# Patient Record
Sex: MALE | Race: ASIAN | NOT HISPANIC OR LATINO
[De-identification: names, ages, dates, MRNs, and addresses within clinical notes are randomized per-mention and may not be internally consistent; named-entity substitution may affect disease eponyms.]

---

## 2019-01-22 ENCOUNTER — APPOINTMENT (OUTPATIENT)
Dept: UROLOGY | Facility: CLINIC | Age: 69
End: 2019-01-22

## 2019-09-10 ENCOUNTER — APPOINTMENT (OUTPATIENT)
Dept: UROLOGY | Facility: CLINIC | Age: 69
End: 2019-09-10
Payer: MEDICARE

## 2019-09-10 VITALS
TEMPERATURE: 98.1 F | RESPIRATION RATE: 16 BRPM | WEIGHT: 128 LBS | BODY MASS INDEX: 21.33 KG/M2 | DIASTOLIC BLOOD PRESSURE: 92 MMHG | OXYGEN SATURATION: 97 % | HEIGHT: 65 IN | SYSTOLIC BLOOD PRESSURE: 155 MMHG | HEART RATE: 79 BPM

## 2019-09-10 DIAGNOSIS — F17.210 NICOTINE DEPENDENCE, CIGARETTES, UNCOMPLICATED: ICD-10-CM

## 2019-09-10 DIAGNOSIS — Z00.00 ENCOUNTER FOR GENERAL ADULT MEDICAL EXAMINATION W/OUT ABNORMAL FINDINGS: ICD-10-CM

## 2019-09-10 DIAGNOSIS — F17.200 NICOTINE DEPENDENCE, UNSPECIFIED, UNCOMPLICATED: ICD-10-CM

## 2019-09-10 DIAGNOSIS — Z78.9 OTHER SPECIFIED HEALTH STATUS: ICD-10-CM

## 2019-09-10 LAB
AFP-TM SERPL-MCNC: 2.1 NG/ML
ANION GAP SERPL CALC-SCNC: 13 MMOL/L
APPEARANCE: CLEAR
BACTERIA: NEGATIVE
BILIRUBIN URINE: NEGATIVE
BLOOD URINE: NEGATIVE
BUN SERPL-MCNC: 19 MG/DL
CALCIUM SERPL-MCNC: 10.2 MG/DL
CHLORIDE SERPL-SCNC: 104 MMOL/L
CO2 SERPL-SCNC: 25 MMOL/L
COLOR: NORMAL
CREAT SERPL-MCNC: 0.84 MG/DL
GLUCOSE QUALITATIVE U: NEGATIVE
GLUCOSE SERPL-MCNC: 85 MG/DL
HCG-TM SERPL-MCNC: <1 MIU/ML
HYALINE CASTS: 0 /LPF
KETONES URINE: NEGATIVE
LDH SERPL-CCNC: 142 U/L
LEUKOCYTE ESTERASE URINE: NEGATIVE
MICROSCOPIC-UA: NORMAL
NITRITE URINE: NEGATIVE
PH URINE: 7.5
POTASSIUM SERPL-SCNC: 4 MMOL/L
PROTEIN URINE: NEGATIVE
RED BLOOD CELLS URINE: 4 /HPF
SODIUM SERPL-SCNC: 142 MMOL/L
SPECIFIC GRAVITY URINE: 1.02
SQUAMOUS EPITHELIAL CELLS: 1 /HPF
UROBILINOGEN URINE: NORMAL
WHITE BLOOD CELLS URINE: 2 /HPF

## 2019-09-10 PROCEDURE — 99204 OFFICE O/P NEW MOD 45 MIN: CPT

## 2019-09-10 NOTE — PHYSICAL EXAM
[General Appearance - Well Developed] : well developed [Normal Appearance] : normal appearance [General Appearance - Well Nourished] : well nourished [General Appearance - In No Acute Distress] : no acute distress [Well Groomed] : well groomed [Edema] : no peripheral edema [Respiration, Rhythm And Depth] : normal respiratory rhythm and effort [Exaggerated Use Of Accessory Muscles For Inspiration] : no accessory muscle use [Abdomen Soft] : soft [Abdomen Tenderness] : non-tender [Costovertebral Angle Tenderness] : no ~M costovertebral angle tenderness [Urethral Meatus] : meatus normal [Urinary Bladder Findings] : the bladder was normal on palpation [Scrotum] : the scrotum was normal [Testes Mass (___cm)] : there were no testicular masses [No Prostate Nodules] : no prostate nodules [Normal Station and Gait] : the gait and station were normal for the patient's age [] : no rash [No Focal Deficits] : no focal deficits [Affect] : the affect was normal [Oriented To Time, Place, And Person] : oriented to person, place, and time [Mood] : the mood was normal [Not Anxious] : not anxious [No Palpable Adenopathy] : no palpable adenopathy

## 2019-09-10 NOTE — ADDENDUM
[FreeTextEntry1] : Entered by Dallas Chung, acting as scribe for Dr. Jass Alcantara.\par \par The documentation recorded by the scribe accurately reflects the service I personally performed and the decisions made by me.

## 2019-09-10 NOTE — LETTER BODY
[FreeTextEntry1] : Dallas Heller MD\par [address] [place] [address] - [address]\[place]  [address]\par [phone]\par [phone]\par \par Dear Dr. Heller,\par \par Reason for visit: Microscopic hematuria.\par \par This is a 68 year-old Mandarin-speaking man with microscopic hematuria referred for evaluation. He is accompanied by his wife today. His urinalysis demonstrated evidence of microscopic hematuria, [] RBC/HPF. He denies any gross hematuria, dysuria or urinary incontinence. The patient denies any aggravating or relieving factors. The patient denies any interference of function. The patient is entirely asymptomatic. All other review of systems are negative. He has no cancer in his family medical history. He has previously undergone gastrectomy. Past medical history, family history and social history were inquired and were noncontributory to current condition. Patient currently smokes. I encourage the patient to stop smoking and seek smoking cessation programs. I discuss with him to potential long term complications and health effects from smoking. I gave the patient additional information including the [facility] Refer-to-Quit program. I spent over 3 minutes counseling the patient regarding smoking cessation. He did not bring his complete medication list with him today. He has no known allergies to medication. \par \par On examination, the patient is a healthy-appearing gentleman in no acute distress. He is alert and oriented follows commands. He has normal mood and affect. He is normocephalic. Neck is supple. Respirations are unlabored. His abdomen is soft and nontender. He has an upper midline incision from his previous surgery. Bladder is nonpalpable. No CVA tenderness. Neurologically he is grossly intact. No peripheral edema. Skin without gross abnormality. He has normal male external genitalia. Normal meatus. Bilateral testes are descended intrascrotally. He has a left testicular mass. On rectal examination, there is normal sphincter tone. The prostate is clinically benign without focal induration or nodularity.\par \par His urinalysis demonstrated evidence of microscopic hematuria and hyperuricemia.\par \par Assessment: Microscopic hematuria. Left testicular mass. Hyperuricemia.\par \par I counseled the patient on the various etiologies of the microscopic hematuria. I discussed the risk of occult malignancy. I recommended patient obtain urine cytology, urinalysis, and cystoscopy. I counseled the patient about the procedures. I answered the patient's questions. The risks and expected outcomes were discussed. In terms of his left testicular mass, the patient will obtain testicular ultrasound, CT urogram, and tumor marker panel. He will obtain BMP today to ensure stability. The patient will follow up as directed and contact me with any questions or concerns. Thank you for the opportunity to participate in the care of Mr. BRENNAN. I will keep you updated on his progress. \par \par Plan: Testicular ultrasound. Tumor marker panel. CT urogram. BMP. Urinalysis. Cytology. Cystoscopy. Follow up as directed.

## 2019-09-12 LAB — URINE CYTOLOGY: NORMAL

## 2019-09-13 ENCOUNTER — APPOINTMENT (OUTPATIENT)
Dept: UROLOGY | Facility: CLINIC | Age: 69
End: 2019-09-13
Payer: MEDICARE

## 2019-09-13 VITALS
SYSTOLIC BLOOD PRESSURE: 187 MMHG | BODY MASS INDEX: 21.97 KG/M2 | DIASTOLIC BLOOD PRESSURE: 100 MMHG | RESPIRATION RATE: 18 BRPM | OXYGEN SATURATION: 100 % | TEMPERATURE: 97.5 F | HEART RATE: 74 BPM | WEIGHT: 132 LBS

## 2019-09-13 PROCEDURE — 52000 CYSTOURETHROSCOPY: CPT

## 2019-09-13 RX ORDER — CIPROFLOXACIN HYDROCHLORIDE 500 MG/1
500 TABLET, FILM COATED ORAL
Refills: 0 | Status: COMPLETED | OUTPATIENT
Start: 2019-09-13

## 2019-09-13 RX ADMIN — CIPROFLOXACIN HYDROCHLORIDE 0 MG: 500 TABLET, FILM COATED ORAL at 00:00

## 2019-09-17 LAB — URINE CYTOLOGY: NORMAL

## 2019-09-24 ENCOUNTER — APPOINTMENT (OUTPATIENT)
Dept: UROLOGY | Facility: CLINIC | Age: 69
End: 2019-09-24

## 2019-12-17 ENCOUNTER — APPOINTMENT (OUTPATIENT)
Dept: UROLOGY | Facility: CLINIC | Age: 69
End: 2019-12-17
Payer: MEDICARE

## 2019-12-17 VITALS
SYSTOLIC BLOOD PRESSURE: 161 MMHG | TEMPERATURE: 97.8 F | BODY MASS INDEX: 22.13 KG/M2 | RESPIRATION RATE: 18 BRPM | HEART RATE: 98 BPM | WEIGHT: 133 LBS | OXYGEN SATURATION: 96 % | DIASTOLIC BLOOD PRESSURE: 100 MMHG

## 2019-12-17 PROCEDURE — 99213 OFFICE O/P EST LOW 20 MIN: CPT

## 2019-12-17 NOTE — LETTER BODY
[FreeTextEntry1] : Dallas Heller MD\par [address] [place] [address] - [address]\[place] [address]\par [phone]\par [phone]\par \par Dear Dr. Heller,\par \par Reason for visit: Microscopic hematuria. Bilateral hydrocele.\par \par This is a 68 year-old Mandarin-speaking man with microscopic hematuria and left hydrocele. He returns today for follow up. Since his last visit, he obtained an unremarkable CT urogram and a testicular ultrasound that demonstrated bilateral hydrocele. He denies any interval complaints or difficulties. He denies any gross hematuria, dysuria or urinary incontinence. The patient denies any aggravating or relieving factors. The patient denies any interference of function. The patient is entirely asymptomatic. He has no pain currently. He denies any changes in health. The past medical history, family history and social history are unchanged. All other review of systems are negative. Patient denies any changes in medications. Medication list was reconciled. He has no known allergies to medication. \par \par On examination, the patient is a healthy-appearing gentleman in no acute distress. He is alert and oriented follows commands. He has normal mood and affect. He is normocephalic. Neck is supple. Respirations are unlabored. His abdomen is soft and nontender. He has an upper midline incision from his previous surgery. Bladder is nonpalpable. No CVA tenderness. Neurologically he is grossly intact. No peripheral edema. Skin without gross abnormality. He has normal male external genitalia. Normal meatus. Bilateral testes are descended intrascrotally. He has a left testicular mass. On rectal examination, there is normal sphincter tone. The prostate is clinically benign without focal induration or nodularity. He has a large left hydrocele and small right hydrocele.\par \par His CT abdomen and pelvis demonstrated enlarged prostate and no evidence of renal stones or hydronephrosis. His testicular ultrasound demonstrated bilateral hydrocele and no evidence of testicular mass.\par \par Assessment: Microscopic hematuria. Bilateral hydrocele. Hyperuricemia.\par \par I counseled the patient. I reassured him. His recent scrotal ultrasound demonstrated bilateral hydrocele and no evidence of testicular mass. His CT urogram was negative for stones. I recommend conservative management as the patient has no pain and is currently asymptomatic. In terms of his previous microscopic hematuria, he will repeat urinalysis and urine cytology today to ensure stability. Risks and alternatives were discussed. I answered the patient questions. The patient will follow-up as directed and will contact me with any questions or concerns. Thank you for the opportunity to participate in the care of Mr. BRENNAN. I will keep you updated on his progress. \par \par Plan: Urinalysis. Cytology. Follow up in 1 year.

## 2019-12-18 LAB
APPEARANCE: CLEAR
BACTERIA: NEGATIVE
BILIRUBIN URINE: NEGATIVE
BLOOD URINE: NEGATIVE
COLOR: NORMAL
GLUCOSE QUALITATIVE U: NEGATIVE
HYALINE CASTS: 0 /LPF
KETONES URINE: NEGATIVE
LEUKOCYTE ESTERASE URINE: NEGATIVE
MICROSCOPIC-UA: NORMAL
NITRITE URINE: NEGATIVE
PH URINE: 6.5
PROTEIN URINE: NEGATIVE
RED BLOOD CELLS URINE: 1 /HPF
SPECIFIC GRAVITY URINE: 1.01
SQUAMOUS EPITHELIAL CELLS: 1 /HPF
UROBILINOGEN URINE: NORMAL
WHITE BLOOD CELLS URINE: 1 /HPF

## 2019-12-19 LAB — URINE CYTOLOGY: NORMAL

## 2020-12-18 ENCOUNTER — APPOINTMENT (OUTPATIENT)
Dept: UROLOGY | Facility: CLINIC | Age: 70
End: 2020-12-18

## 2022-02-15 ENCOUNTER — APPOINTMENT (OUTPATIENT)
Dept: CARDIOLOGY | Facility: CLINIC | Age: 72
End: 2022-02-15
Payer: MEDICARE

## 2022-02-15 VITALS
SYSTOLIC BLOOD PRESSURE: 170 MMHG | TEMPERATURE: 98.6 F | RESPIRATION RATE: 18 BRPM | WEIGHT: 132 LBS | DIASTOLIC BLOOD PRESSURE: 110 MMHG | HEART RATE: 117 BPM | OXYGEN SATURATION: 97 % | BODY MASS INDEX: 21.97 KG/M2

## 2022-02-15 DIAGNOSIS — R94.31 ABNORMAL ELECTROCARDIOGRAM [ECG] [EKG]: ICD-10-CM

## 2022-02-15 PROCEDURE — 99203 OFFICE O/P NEW LOW 30 MIN: CPT

## 2022-02-17 ENCOUNTER — APPOINTMENT (OUTPATIENT)
Dept: THORACIC SURGERY | Facility: CLINIC | Age: 72
End: 2022-02-17
Payer: MEDICARE

## 2022-02-17 VITALS
HEART RATE: 96 BPM | RESPIRATION RATE: 18 BRPM | SYSTOLIC BLOOD PRESSURE: 154 MMHG | DIASTOLIC BLOOD PRESSURE: 95 MMHG | WEIGHT: 131 LBS | OXYGEN SATURATION: 97 % | BODY MASS INDEX: 21.8 KG/M2 | TEMPERATURE: 98.2 F

## 2022-02-17 DIAGNOSIS — Z86.73 PERSONAL HISTORY OF TRANSIENT ISCHEMIC ATTACK (TIA), AND CEREBRAL INFARCTION W/OUT RESIDUAL DEFICITS: ICD-10-CM

## 2022-02-17 DIAGNOSIS — Z86.79 PERSONAL HISTORY OF OTHER DISEASES OF THE CIRCULATORY SYSTEM: ICD-10-CM

## 2022-02-17 DIAGNOSIS — Z80.9 FAMILY HISTORY OF MALIGNANT NEOPLASM, UNSPECIFIED: ICD-10-CM

## 2022-02-17 PROCEDURE — 99205 OFFICE O/P NEW HI 60 MIN: CPT

## 2022-02-18 PROBLEM — Z86.73 HISTORY OF CEREBROVASCULAR ACCIDENT: Status: RESOLVED | Noted: 2022-02-18 | Resolved: 2022-02-18

## 2022-02-18 PROBLEM — Z86.79 HISTORY OF CARDIAC DISORDER: Status: RESOLVED | Noted: 2022-02-18 | Resolved: 2022-02-18

## 2022-02-18 PROBLEM — Z86.79 HISTORY OF HYPERTENSION: Status: RESOLVED | Noted: 2022-02-18 | Resolved: 2022-02-18

## 2022-02-18 PROBLEM — Z80.9 FAMILY HISTORY OF MALIGNANT NEOPLASM: Status: ACTIVE | Noted: 2022-02-18

## 2022-02-18 RX ORDER — ENALAPRIL MALEATE 10 MG/1
10 TABLET ORAL
Qty: 30 | Refills: 0 | Status: ACTIVE | COMMUNITY
Start: 2022-02-04

## 2022-02-18 RX ORDER — CIPROFLOXACIN HYDROCHLORIDE 500 MG/1
500 TABLET, FILM COATED ORAL
Qty: 2 | Refills: 0 | Status: COMPLETED | COMMUNITY
Start: 2019-09-13 | End: 2022-02-18

## 2022-02-18 RX ORDER — AMLODIPINE BESYLATE 10 MG/1
10 TABLET ORAL
Qty: 30 | Refills: 0 | Status: ACTIVE | COMMUNITY
Start: 2022-02-04

## 2022-02-18 RX ORDER — METOPROLOL SUCCINATE 50 MG/1
50 TABLET, EXTENDED RELEASE ORAL
Qty: 30 | Refills: 0 | Status: ACTIVE | COMMUNITY
Start: 2022-02-04

## 2022-02-18 RX ORDER — HYDROCHLOROTHIAZIDE 25 MG/1
25 TABLET ORAL
Qty: 30 | Refills: 0 | Status: ACTIVE | COMMUNITY
Start: 2022-02-04

## 2022-02-18 NOTE — HISTORY OF PRESENT ILLNESS
[FreeTextEntry1] : Mr. JESUS BRENNAN, 71 year old male, current smoker, poor historian, w/ hx of HTN, stroke, heart disease?, who presented with abnormal CT for lung cancer screening. \par \par CT Chest on 1/3/22:\par - new 8 mm nodule w/o solid component in LLL laterally (4:135)\par \par Pt presents today for CT Sx consultation, referred by Dr. Heller. Pt reports asymptomatic, denies fever, chills, SOB, cough, hemoptysis, CP, pain or recent illness.\par \par

## 2022-02-18 NOTE — CONSULT LETTER
[Dear  ___] : Dear  [unfilled], [Consult Letter:] : I had the pleasure of evaluating your patient, [unfilled]. [( Thank you for referring [unfilled] for consultation for _____ )] : Thank you for referring [unfilled] for consultation for [unfilled] [Please see my note below.] : Please see my note below. [Consult Closing:] : Thank you very much for allowing me to participate in the care of this patient.  If you have any questions, please do not hesitate to contact me. [Sincerely,] : Sincerely, [FreeTextEntry2] : Dallas Heller MD (PCP/ref) [FreeTextEntry3] : Mark Velez MD, MPH \par System Director of Thoracic Surgery \par Director of Comprehensive Lung and Foregut [place] \par Professor Cardiovascular & Thoracic Surgery  \par [facility] at [facility]\par

## 2022-02-18 NOTE — PHYSICAL EXAM
[Ambulatory and capable of all self care but unable to carry out any work activities] : Status 2- Ambulatory and capable of all self care but unable to carry out any work activities. Up and about more than 50% of waking hours [General Appearance - Alert] : alert [General Appearance - In No Acute Distress] : in no acute distress [Sclera] : the sclera and conjunctiva were normal [PERRL With Normal Accommodation] : pupils were equal in size, round, and reactive to light [Extraocular Movements] : extraocular movements were intact [Outer Ear] : the ears and nose were normal in appearance [Oropharynx] : the oropharynx was normal [Neck Appearance] : the appearance of the neck was normal [Neck Cervical Mass (___cm)] : no neck mass was observed [Jugular Venous Distention Increased] : there was no jugular-venous distention [Thyroid Diffuse Enlargement] : the thyroid was not enlarged [Thyroid Nodule] : there were no palpable thyroid nodules [Auscultation Breath Sounds / Voice Sounds] : lungs were clear to auscultation bilaterally [Heart Rate And Rhythm] : heart rate was normal and rhythm regular [Heart Sounds] : normal S1 and S2 [Heart Sounds Gallop] : no gallops [Murmurs] : no murmurs [Heart Sounds Pericardial Friction Rub] : no pericardial rub [Examination Of The Chest] : the chest was normal in appearance [Chest Visual Inspection Thoracic Asymmetry] : no chest asymmetry [Diminished Respiratory Excursion] : normal chest expansion [2+] : left 2+ [Breast Appearance] : normal in appearance [Breast Palpation Mass] : no palpable masses [Bowel Sounds] : normal bowel sounds [Abdomen Soft] : soft [Abdomen Tenderness] : non-tender [Abdomen Mass (___ Cm)] : no abdominal mass palpated [Cervical Lymph Nodes Enlarged Posterior Bilaterally] : posterior cervical [Cervical Lymph Nodes Enlarged Anterior Bilaterally] : anterior cervical [Supraclavicular Lymph Nodes Enlarged Bilaterally] : supraclavicular [No CVA Tenderness] : no ~M costovertebral angle tenderness [No Spinal Tenderness] : no spinal tenderness [Abnormal Walk] : normal gait [Nail Clubbing] : no clubbing  or cyanosis of the fingernails [Musculoskeletal - Swelling] : no joint swelling seen [Motor Tone] : muscle strength and tone were normal [Skin Turgor] : normal skin turgor [Skin Color & Pigmentation] : normal skin color and pigmentation [] : no rash [Deep Tendon Reflexes (DTR)] : deep tendon reflexes were 2+ and symmetric [Sensation] : the sensory exam was normal to light touch and pinprick [No Focal Deficits] : no focal deficits [Oriented To Time, Place, And Person] : oriented to person, place, and time [Impaired Insight] : insight and judgment were intact [Affect] : the affect was normal [FreeTextEntry1] : deferred

## 2022-02-25 PROBLEM — R94.31 ABNORMAL ECG: Status: ACTIVE | Noted: 2022-02-25

## 2022-02-25 NOTE — HISTORY OF PRESENT ILLNESS
[FreeTextEntry1] : 71 year-old male smoker with HTN presents for evaluation of HTN.  Patient reports that he used to take 4 BP medications, Amlodipine 10 mg, Enalapril 10 mg, Metoprolol ER 50 mg, and HCTZ 25 mg.  He is now taking only one BP medication (big yellow pill) because he no longer has HA.  Patient denies CP, SOB, palpitations, or lightheadedness.  Patient denies h/o syncope.  Patient was noted to have an abnormal ECG by PCP, showing nonspecific STTw changes.  I advised patient to undergo an echocardiogram.  I will obtain insurance authorization (HTN, hypertensive heart disease).  I advised patient to take all 4 of his BP medications.

## 2022-03-15 ENCOUNTER — APPOINTMENT (OUTPATIENT)
Dept: CARDIOLOGY | Facility: CLINIC | Age: 72
End: 2022-03-15
Payer: MEDICARE

## 2022-03-15 VITALS — DIASTOLIC BLOOD PRESSURE: 81 MMHG | TEMPERATURE: 97.8 F | SYSTOLIC BLOOD PRESSURE: 153 MMHG

## 2022-03-15 PROCEDURE — 93306 TTE W/DOPPLER COMPLETE: CPT

## 2022-05-19 ENCOUNTER — APPOINTMENT (OUTPATIENT)
Dept: THORACIC SURGERY | Facility: CLINIC | Age: 72
End: 2022-05-19

## 2022-10-18 ENCOUNTER — APPOINTMENT (OUTPATIENT)
Dept: CARDIOLOGY | Facility: CLINIC | Age: 72
End: 2022-10-18

## 2022-10-18 ENCOUNTER — NON-APPOINTMENT (OUTPATIENT)
Age: 72
End: 2022-10-18

## 2022-10-18 VITALS
RESPIRATION RATE: 18 BRPM | SYSTOLIC BLOOD PRESSURE: 143 MMHG | WEIGHT: 134 LBS | OXYGEN SATURATION: 97 % | BODY MASS INDEX: 22.3 KG/M2 | TEMPERATURE: 98 F | HEART RATE: 86 BPM | DIASTOLIC BLOOD PRESSURE: 83 MMHG

## 2022-10-18 DIAGNOSIS — I10 ESSENTIAL (PRIMARY) HYPERTENSION: ICD-10-CM

## 2022-10-18 PROCEDURE — 99214 OFFICE O/P EST MOD 30 MIN: CPT | Mod: 25

## 2022-10-18 PROCEDURE — 93000 ELECTROCARDIOGRAM COMPLETE: CPT

## 2022-10-18 NOTE — PHYSICAL EXAM
[Well Developed] : well developed [No Acute Distress] : no acute distress [Well Nourished] : well nourished [Normal Conjunctiva] : normal conjunctiva [Normal Venous Pressure] : normal venous pressure [No Carotid Bruit] : no carotid bruit [Normal S1, S2] : normal S1, S2 [No Murmur] : no murmur [No Rub] : no rub [No Gallop] : no gallop [Clear Lung Fields] : clear lung fields [Good Air Entry] : good air entry [No Respiratory Distress] : no respiratory distress  [Soft] : abdomen soft [Non Tender] : non-tender [No Masses/organomegaly] : no masses/organomegaly [Normal Bowel Sounds] : normal bowel sounds [Normal Gait] : normal gait [No Edema] : no edema [No Cyanosis] : no cyanosis [No Clubbing] : no clubbing [No Varicosities] : no varicosities [Moves all extremities] : moves all extremities [No Focal Deficits] : no focal deficits [Normal Speech] : normal speech [Alert and Oriented] : alert and oriented [Normal memory] : normal memory

## 2022-10-31 NOTE — REASON FOR VISIT
[FreeTextEntry1] : 71 year-old male smoker with HTN presents for followup.  \par \par Patient was last seen on 2/15/22 for evaluation of HTN.

## 2022-10-31 NOTE — HISTORY OF PRESENT ILLNESS
[FreeTextEntry1] : 10/18/22- Patient denies CP, SOB, palpitations, or lightheadedness. Patient reports that he does not take BP at home. He reports getting HA when his BP goes over 180. Patient had CT in May and was told to follow up in 6 months. I advised patient to continue to follow up. \par \par 2/15/22 - Patient reports that he used to take 4 BP medications, Amlodipine 10 mg, Enalapril 10 mg, Metoprolol ER 50 mg, and HCTZ 25 mg.  He is now taking only one BP medication (big yellow pill) because he no longer has HA.  Patient denies CP, SOB, palpitations, or lightheadedness.  Patient denies h/o syncope.  Patient was noted to have an abnormal ECG by PCP, showing nonspecific STTw changes.  I advised patient to undergo an echocardiogram.  I will obtain insurance authorization (HTN, hypertensive heart disease).  I advised patient to take all 4 of his BP medications.

## 2022-12-15 ENCOUNTER — APPOINTMENT (OUTPATIENT)
Dept: THORACIC SURGERY | Facility: CLINIC | Age: 72
End: 2022-12-15

## 2022-12-15 VITALS
DIASTOLIC BLOOD PRESSURE: 70 MMHG | SYSTOLIC BLOOD PRESSURE: 106 MMHG | HEART RATE: 74 BPM | TEMPERATURE: 98 F | WEIGHT: 134 LBS | OXYGEN SATURATION: 96 % | RESPIRATION RATE: 18 BRPM | BODY MASS INDEX: 22.3 KG/M2

## 2022-12-15 PROCEDURE — 99214 OFFICE O/P EST MOD 30 MIN: CPT

## 2022-12-17 RX ORDER — CLOBETASOL PROPIONATE 0.5 MG/G
0.05 CREAM TOPICAL
Qty: 60 | Refills: 0 | Status: ACTIVE | COMMUNITY
Start: 2022-06-21

## 2022-12-17 RX ORDER — ATORVASTATIN CALCIUM 10 MG/1
10 TABLET, FILM COATED ORAL
Qty: 30 | Refills: 0 | Status: ACTIVE | COMMUNITY
Start: 2022-11-22

## 2022-12-17 RX ORDER — LOSARTAN POTASSIUM 100 MG/1
100 TABLET, FILM COATED ORAL
Qty: 30 | Refills: 0 | Status: ACTIVE | COMMUNITY
Start: 2022-12-11

## 2022-12-17 RX ORDER — AMOXICILLIN AND CLAVULANATE POTASSIUM 500; 125 MG/1; MG/1
500-125 TABLET, FILM COATED ORAL
Qty: 14 | Refills: 0 | Status: COMPLETED | COMMUNITY
Start: 2022-02-17 | End: 2022-12-17

## 2022-12-17 RX ORDER — HYDROCORTISONE 1 %
12 CREAM (GRAM) TOPICAL
Qty: 400 | Refills: 0 | Status: ACTIVE | COMMUNITY
Start: 2022-07-15

## 2022-12-17 NOTE — CONSULT LETTER
[FreeTextEntry2] : Dallas Heller MD (PCP/ref)  [FreeTextEntry3] : Mark Velez MD, MPH \par System Director of Thoracic Surgery \par Director of Comprehensive Lung and Foregut [place] \par Professor Cardiovascular & Thoracic Surgery  \par [facility] at [facility]\par \par [facility]\par [address]\par Oncology [address]\[place] [address]\par Tel: [phone]\par Fax: [phone]\par

## 2022-12-17 NOTE — DATA REVIEWED
[FreeTextEntry1] : I have independently reviewed the following:\par CT Chest on 5/10/22\par CT Chest on 12/6/22 at [facility]

## 2022-12-17 NOTE — ASSESSMENT
[FreeTextEntry1] : Mr. JESUS BRENNAN, 71 year old male, current smoker, poor historian, w/ hx of HTN, stroke, heart disease?, who presented with abnormal CT for lung cancer screening. \par \par CT Chest on 12/6/22 at [facility]:\par - stable 1.1cm LLL ggo (4:127), w/o solid component\par - no new nodules\par \par I have reviewed the patient's medical records and diagnostic images at time of this office consultation and have made the following recommendation:\par 1. CT reviewed, LLL ggo has increased in size, therefore, I recommended surgery. However, pt declined surgery for now, would like to continue observation. RTC in 6 mons with CT Chest w/o contrast\par 2. PFTs \par \par \par I, Dr. HILL, KENDALL CELESTE, personally performed the evaluation and management (E/M) services for this established patient who presents today with (a) new problem(s)/exacerbation of (an) existing condition(s).  That E/M includes conducting the examination, assessing all new/exacerbated conditions, and establishing a new plan of care.  Today, my ACP, Brittney Mojica NP was here to observe my evaluation and management services for this new problem/exacerbated condition to be followed going forward.\par \par \par \par \par

## 2022-12-17 NOTE — HISTORY OF PRESENT ILLNESS
[FreeTextEntry1] : Mr. JESUS BRENNAN, 71 year old male, current smoker, poor historian, w/ hx of HTN, stroke, heart disease?, who presented with abnormal CT for lung cancer screening. \par \par CT Chest on 1/3/22:\par - new 8 mm nodule w/o solid component in LLL laterally (4:135)\par ** lung nodule is likely to be inflammatory, completed a course to Augmentin in Feb 2022.\par \par Repeat CT Chest on 5/10/22:\par - stable 1.1cm LLL ggo (3:66)\par * Patient was a no show on 5/19/22 *\par \par CT Chest on 12/6/22 at [facility]:\par - stable 1.1cm LLL ggo (4:127), w/o solid component\par - no new nodules\par \par Patient is here today for a follow up. Pt reports doing well, denies SOB, cough or CP.\par \par \par

## 2023-06-15 ENCOUNTER — APPOINTMENT (OUTPATIENT)
Dept: THORACIC SURGERY | Facility: CLINIC | Age: 73
End: 2023-06-15
Payer: MEDICARE

## 2023-06-15 VITALS
BODY MASS INDEX: 22.95 KG/M2 | TEMPERATURE: 97.6 F | SYSTOLIC BLOOD PRESSURE: 187 MMHG | RESPIRATION RATE: 18 BRPM | WEIGHT: 137.9 LBS | OXYGEN SATURATION: 97 % | DIASTOLIC BLOOD PRESSURE: 94 MMHG | HEART RATE: 100 BPM

## 2023-06-15 PROCEDURE — 99214 OFFICE O/P EST MOD 30 MIN: CPT

## 2023-06-15 NOTE — CONSULT LETTER
[Dear  ___] : Dear  [unfilled], [Consult Letter:] : I had the pleasure of evaluating your patient, [unfilled]. [( Thank you for referring [unfilled] for consultation for _____ )] : Thank you for referring [unfilled] for consultation for [unfilled] [Please see my note below.] : Please see my note below. [Consult Closing:] : Thank you very much for allowing me to participate in the care of this patient.  If you have any questions, please do not hesitate to contact me. [Sincerely,] : Sincerely, [FreeTextEntry2] : Dallas Heller MD (PCP/ref)  [FreeTextEntry3] : Mark Velez MD, MPH \par System Director of Thoracic Surgery \par Director of Comprehensive Lung and Foregut [place] \par Professor Cardiovascular & Thoracic Surgery  \par [facility] at [facility]\par \par [facility]\par [address]\par Oncology [address]\[place] [address]\par Tel: [phone]\par Fax: [phone]\par

## 2023-06-15 NOTE — HISTORY OF PRESENT ILLNESS
[FreeTextEntry1] : Mr. JESUS BRENNAN, 72 year old male, current smoker, poor historian, w/ hx of HTN, stroke, heart disease?, who presented with abnormal CT for lung cancer screening. \par \par CT Chest on 1/3/22:\par - new 8 mm nodule w/o solid component in LLL laterally (4:135)\par ** lung nodule is likely to be inflammatory, completed a course to Augmentin in Feb 2022.\par \par Repeat CT Chest on 5/10/22:\par - stable 1.1cm LLL ggo (3:66)\par * Patient was a no show on 5/19/22 *\par \par CT Chest on 12/6/22 at [facility]:\par - stable 1.1cm LLL ggo (4:127), w/o solid component\par - no new nodules\par \par CT Chest on 6/6/23:\par stable 1.1 cm ggo in LLL (3:135)w/o solid component\par \par Pt presents today for follow up. The patient denies SOB, cough, chest pain, hemoptysis, palpitation, fever, recent illness, hospitalization and significant weight loss.\par \par \par \par \par

## 2023-06-15 NOTE — PHYSICAL EXAM
[Respiration, Rhythm And Depth] : normal respiratory rhythm and effort [Auscultation Breath Sounds / Voice Sounds] : lungs were clear to auscultation bilaterally [Apical Impulse] : the apical impulse was normal [Heart Rate And Rhythm] : heart rate was normal and rhythm regular [Heart Sounds] : normal S1 and S2 [Examination Of The Chest] : the chest was normal in appearance [Bowel Sounds] : normal bowel sounds [Abdomen Soft] : soft [Abdomen Tenderness] : non-tender [Skin Color & Pigmentation] : normal skin color and pigmentation [Skin Turgor] : normal skin turgor [] : no rash [No Focal Deficits] : no focal deficits [Oriented To Time, Place, And Person] : oriented to person, place, and time [Affect] : the affect was normal [Mood] : the mood was normal

## 2023-06-15 NOTE — ASSESSMENT
[FreeTextEntry1] : Mr. JESUS BRENNAN, 72 year old male, current smoker, poor historian, w/ hx of HTN, stroke, heart disease?, who presented with abnormal CT for lung cancer screening. \par \par CT Chest on 1/3/22:\par - new 8 mm nodule w/o solid component in LLL laterally (4:135)\par ** lung nodule is likely to be inflammatory, completed a course to Augmentin in Feb 2022.\par \par Repeat CT Chest on 5/10/22:\par - stable 1.1cm LLL ggo (3:66)\par * Patient was a no show on 5/19/22 *\par \par CT Chest on 12/6/22 at [facility]:\par - stable 1.1cm LLL ggo (4:127), w/o solid component\par - no new nodules\par \par CT Chest on 6/6/23:\par stable 1.1 cm ggo in LLL (3:135)w/o solid component\par \par I have reviewed the patient's medical records and diagnostic images at time of this office consultation and have made the following recommendation:\par 1. stable lung nodule, continue surveillance. \par 2. RTC in 1 year with CT chest. \par \par \par \par I, KENDALL Shultz, personally performed the evaluation and management (E/M) services for this established patient who follow up today with an existing condition.  That E/M includes conducting the examination, assessing all new/exacerbated/existing conditions, and establishing a plan of care.  Today, my ACP, Maci Brennan NP, was here to observe my evaluation and management services for this existing condition to be followed going forward.\par \par

## 2023-12-05 ENCOUNTER — APPOINTMENT (OUTPATIENT)
Dept: UROLOGY | Facility: CLINIC | Age: 73
End: 2023-12-05
Payer: MEDICARE

## 2023-12-05 VITALS
WEIGHT: 133 LBS | SYSTOLIC BLOOD PRESSURE: 133 MMHG | HEART RATE: 76 BPM | DIASTOLIC BLOOD PRESSURE: 78 MMHG | OXYGEN SATURATION: 96 % | TEMPERATURE: 97.6 F | RESPIRATION RATE: 18 BRPM | BODY MASS INDEX: 22.13 KG/M2

## 2023-12-05 DIAGNOSIS — R97.20 ELEVATED PROSTATE, SPECIFIC ANTIGEN [PSA]: ICD-10-CM

## 2023-12-05 DIAGNOSIS — N43.3 HYDROCELE, UNSPECIFIED: ICD-10-CM

## 2023-12-05 DIAGNOSIS — N50.89 OTHER SPECIFIED DISORDERS OF THE MALE GENITAL ORGANS: ICD-10-CM

## 2023-12-05 LAB
AFP-TM SERPL-MCNC: 2.3 NG/ML
ANION GAP SERPL CALC-SCNC: 11 MMOL/L
BUN SERPL-MCNC: 22 MG/DL
CALCIUM SERPL-MCNC: 10 MG/DL
CHLORIDE SERPL-SCNC: 101 MMOL/L
CO2 SERPL-SCNC: 27 MMOL/L
CREAT SERPL-MCNC: 1.1 MG/DL
EGFR: 71 ML/MIN/1.73M2
GLUCOSE SERPL-MCNC: 134 MG/DL
HCG-TM SERPL-MCNC: <1 MIU/ML
LDH SERPL-CCNC: 138 U/L
POTASSIUM SERPL-SCNC: 4.5 MMOL/L
PSA FREE FLD-MCNC: 29 %
PSA FREE SERPL-MCNC: 1.28 NG/ML
PSA SERPL-MCNC: 4.48 NG/ML
SODIUM SERPL-SCNC: 139 MMOL/L

## 2023-12-05 PROCEDURE — 99204 OFFICE O/P NEW MOD 45 MIN: CPT

## 2023-12-05 RX ORDER — SODIUM PHOSPHATE, DIBASIC AND SODIUM PHOSPHATE, MONOBASIC 7; 19 G/230ML; G/230ML
ENEMA RECTAL
Qty: 1 | Refills: 0 | Status: ACTIVE | COMMUNITY
Start: 2023-12-05 | End: 1900-01-01

## 2023-12-06 ENCOUNTER — NON-APPOINTMENT (OUTPATIENT)
Age: 73
End: 2023-12-06

## 2023-12-06 DIAGNOSIS — E79.0 HYPERURICEMIA W/OUT SIGNS OF INFLAMMATORY ARTHRITIS AND TOPHACEOUS DISEASE: ICD-10-CM

## 2023-12-06 DIAGNOSIS — R31.21 ASYMPTOMATIC MICROSCOPIC HEMATURIA: ICD-10-CM

## 2023-12-06 LAB
APPEARANCE: CLEAR
BACTERIA: NEGATIVE /HPF
BILIRUBIN URINE: NEGATIVE
BLOOD URINE: NEGATIVE
CAST: 1 /LPF
COARSE GRANULAR CASTS: PRESENT
COLOR: NORMAL
EPITHELIAL CELLS: 2 /HPF
GLUCOSE QUALITATIVE U: NEGATIVE MG/DL
HYALINE CASTS: PRESENT
KETONES URINE: NEGATIVE MG/DL
LEUKOCYTE ESTERASE URINE: NEGATIVE
MICROSCOPIC-UA: NORMAL
NITRITE URINE: NEGATIVE
PH URINE: 5.5
PROTEIN URINE: NORMAL MG/DL
RED BLOOD CELLS URINE: 9 /HPF
REVIEW: NORMAL
SPECIFIC GRAVITY URINE: 1.02
URINE CYTOLOGY: NORMAL
UROBILINOGEN URINE: 1 MG/DL
WHITE BLOOD CELLS URINE: 1 /HPF

## 2023-12-12 ENCOUNTER — NON-APPOINTMENT (OUTPATIENT)
Age: 73
End: 2023-12-12

## 2023-12-22 ENCOUNTER — APPOINTMENT (OUTPATIENT)
Dept: UROLOGY | Facility: CLINIC | Age: 73
End: 2023-12-22
Payer: MEDICARE

## 2023-12-22 VITALS
OXYGEN SATURATION: 99 % | TEMPERATURE: 96.8 F | SYSTOLIC BLOOD PRESSURE: 164 MMHG | DIASTOLIC BLOOD PRESSURE: 95 MMHG | HEART RATE: 77 BPM | WEIGHT: 140 LBS | RESPIRATION RATE: 18 BRPM | HEIGHT: 65 IN | BODY MASS INDEX: 23.32 KG/M2

## 2023-12-22 PROCEDURE — 52000 CYSTOURETHROSCOPY: CPT

## 2023-12-22 PROCEDURE — 76775 US EXAM ABDO BACK WALL LIM: CPT

## 2023-12-23 LAB
APPEARANCE: CLEAR
BACTERIA: NEGATIVE /HPF
BILIRUBIN URINE: NEGATIVE
BLOOD URINE: NEGATIVE
CAST: 0 /LPF
COLOR: YELLOW
EPITHELIAL CELLS: 0 /HPF
GLUCOSE QUALITATIVE U: NEGATIVE MG/DL
KETONES URINE: NEGATIVE MG/DL
LEUKOCYTE ESTERASE URINE: NEGATIVE
MICROSCOPIC-UA: NORMAL
NITRITE URINE: NEGATIVE
PH URINE: 7
PROTEIN URINE: NEGATIVE MG/DL
RED BLOOD CELLS URINE: 0 /HPF
SPECIFIC GRAVITY URINE: 1.01
UROBILINOGEN URINE: 0.2 MG/DL
WHITE BLOOD CELLS URINE: 0 /HPF

## 2023-12-26 LAB — URINE CYTOLOGY: NORMAL

## 2023-12-28 ENCOUNTER — NON-APPOINTMENT (OUTPATIENT)
Age: 73
End: 2023-12-28

## 2024-06-03 DIAGNOSIS — R91.1 SOLITARY PULMONARY NODULE: ICD-10-CM

## 2024-06-05 PROBLEM — R91.1 LUNG NODULE: Status: ACTIVE | Noted: 2022-02-16

## 2024-06-10 NOTE — CONSULT LETTER
[FreeTextEntry2] : Dallas Heller MD (PCP/ref)  [FreeTextEntry3] : Mark Velez MD, MPH \par  System Director of Thoracic Surgery \par  Director of Comprehensive Lung and Foregut [place] \par  Professor Cardiovascular & Thoracic Surgery  \par  [facility] at [facility]\par  \par  [facility]\par  [address]\par  Oncology [address]\[place] [address]\par  Tel: [phone]\par  Fax: [phone]\par

## 2024-06-10 NOTE — ASSESSMENT
[FreeTextEntry1] :   I have reviewed the patient's medical records and diagnostic images at time of this office consultation and have made the following recommendation: 1.  I, KENDALL Shultz, personally performed the evaluation and management (E/M) services for this established patient who presents today with (a) new problem(s)/exacerbation of (an) existing condition(s).  That E/M includes conducting the examination, assessing all new/exacerbated conditions, and establishing a new plan of care.  Today, my ACP, Brittney Mojica, ZULEMA-BC was here to observe my evaluation and management services for this new problem/exacerbated condition to be followed going forward.

## 2024-06-10 NOTE — HISTORY OF PRESENT ILLNESS
[FreeTextEntry1] : Mr. JESUS BRENNAN, 73 year old male, current smoker, poor historian, w/ hx of HTN, stroke, heart disease?, who presented with abnormal CT for lung cancer screening.   CT Chest on 12/6/22 [place] [facility] - stable 1.1cm LLL ggo (4:127), w/o solid component - no new nodules  CT Chest on 6/6/23: stable 1.1 cm ggo in LLL (3:135)w/o solid component  CT Chest on ...  Pt presents today for follow up.

## 2024-06-13 ENCOUNTER — APPOINTMENT (OUTPATIENT)
Dept: THORACIC SURGERY | Facility: CLINIC | Age: 74
End: 2024-06-13

## 2024-06-21 ENCOUNTER — APPOINTMENT (OUTPATIENT)
Dept: UROLOGY | Facility: CLINIC | Age: 74
End: 2024-06-21

## 2024-07-23 NOTE — HISTORY OF PRESENT ILLNESS
[FreeTextEntry1] : Mr. JESUS BRENNAN, 73 year old male, current smoker, poor historian, w/ hx of HTN, stroke, heart disease?, who presented with abnormal CT for lung cancer screening.   CT Chest [facility] 12[facility]: - stable 1.1cm LLL ggo (4:127), w/o solid component - no new nodules  CT Chest on 6/6/23: stable 1.1 cm ggo in LLL (3:135)w/o solid component  CT Chest on ...  Pt presents today for follow up.   **PHQ-2 completed on ...

## 2024-07-23 NOTE — ASSESSMENT
[FreeTextEntry1] : Mr. JESUS BRENNAN, 73 year old male, current smoker, poor historian, w/ hx of HTN, stroke, heart disease?, who presented with abnormal CT for lung cancer screening.     I have reviewed the patient's medical records and diagnostic images at time of this office consultation and have made the following recommendation: 1.   I, KENDALL Shultz, personally performed the evaluation and management (E/M) services for this established patient who presents today with (a) new problem(s)/exacerbation of (an) existing condition(s). That E/M includes conducting the examination, assessing all new/exacerbated conditions, and establishing a new plan of care. Today, my ACP, Ida Christie NP was here to observe my evaluation and management services for this new problem/exacerbated condition to be followed going forward.

## 2024-07-23 NOTE — CONSULT LETTER
[FreeTextEntry2] : Dallas Heller MD (PCP/ref)  [FreeTextEntry3] : Mark Velez MD, MPH \par  System Director of Thoracic Surgery \par  Director of Comprehensive Lung and Foregut [place] \par  Professor Cardiovascular & Thoracic Surgery  \par  [facility]\par  \par  [facility]\par  [address]\par  Oncology [address]\[place] [address]\par  Tel: [phone]\par  Fax: [phone]\par

## 2024-07-25 ENCOUNTER — APPOINTMENT (OUTPATIENT)
Dept: THORACIC SURGERY | Facility: CLINIC | Age: 74
End: 2024-07-25

## 2024-08-26 NOTE — ASSESSMENT
[FreeTextEntry1] : Mr. JESUS BRENNAN, 73 year old male, current smoker, poor historian, w/ hx of HTN, stroke, heart disease?, who presented with abnormal CT for lung cancer screening.   I have reviewed the patient's medical records and diagnostic images at time of this office consultation and have made the following recommendation: 1.

## 2024-08-26 NOTE — CONSULT LETTER
[Dear  ___] : Dear  [unfilled], [Consult Letter:] : I had the pleasure of evaluating your patient, [unfilled]. [( Thank you for referring [unfilled] for consultation for _____ )] : Thank you for referring [unfilled] for consultation for [unfilled] [Please see my note below.] : Please see my note below. [Consult Closing:] : Thank you very much for allowing me to participate in the care of this patient.  If you have any questions, please do not hesitate to contact me. [Sincerely,] : Sincerely, [FreeTextEntry2] : Dallas Heller MD (PCP/ref)  [FreeTextEntry3] : Mark Velez MD, MPH \par  System Director of Thoracic Surgery \par  Director of Comprehensive Lung and Foregut [place] \par  Professor Cardiovascular & Thoracic Surgery  \par  [facility]\par  \par  [facility]\par  [address]\par  Oncology [address]\[place] [address]\par  Tel: [phone]\par  Fax: [phone]\par

## 2024-08-26 NOTE — HISTORY OF PRESENT ILLNESS
[FreeTextEntry1] : Mr. JESUS BRENNAN, 73 year old male, current smoker, poor historian, w/ hx of HTN, stroke, heart disease?, who presented with abnormal CT for lung cancer screening.   [place] 12[place]6/22 [place] [facility] [place] - stable 1.1cm LLL ggo (4:127), w/o solid component - no new nodules  CT Chest on 6/6/23: stable 1.1 cm ggo in LLL (3:135)w/o solid component  CT Chest on ...  Pt presents today for follow up.   **PHQ-2 completed on ...

## 2024-08-29 ENCOUNTER — APPOINTMENT (OUTPATIENT)
Dept: THORACIC SURGERY | Facility: CLINIC | Age: 74
End: 2024-08-29

## 2024-09-04 NOTE — CONSULT LETTER
[FreeTextEntry2] : Dallas Heller MD (PCP/ref)  [FreeTextEntry3] : Mark Velez MD, MPH \par  System Director of Thoracic Surgery \par  Director of Comprehensive Lung and Foregut [place] \par  Professor Cardiovascular & Thoracic Surgery  \par  [facility]\par  \par  [facility]\par  [address]\par  Oncology [address]\[place] [address]\par  Tel: [phone]\par  Fax: [phone]\par

## 2024-09-04 NOTE — CONSULT LETTER
[FreeTextEntry2] : Dallas Heller MD (PCP/ref)  [FreeTextEntry3] : Mark Velez MD, MPH \par  System Director of Thoracic Surgery \par  Director of Comprehensive Lung and Foregut [place] \par  Professor Cardiovascular & Thoracic Surgery  \par  [facility]\par  \par  [facility]\par  [address]\par  Oncology [address]\[place] [address]\par  Tel: [phone]\par  Fax: [phone]\par

## 2024-09-04 NOTE — HISTORY OF PRESENT ILLNESS
[FreeTextEntry1] : Mr. JESUS BRENNAN, 73 year old male, current smoker, poor historian, w/ hx of HTN, stroke, heart disease?, who presented with abnormal CT for lung cancer screening.   CT Chest on 12/6/22 at [facility]: - stable 1.1cm LLL ggo (4:127), w/o solid component - no new nodules  CT Chest on 6/6/23: stable 1.1 cm ggo in LLL (3:135)w/o solid component  CT Chest on 8/30/24 at [facility]: -  no focal consolidation, pleural effusion, pneumothorax or mass.  - In the left lower lobe is a 1.3 cm groundglass nodule without solid component, previously measuring approximately 1.2 cm when measured similarly.  - No new pulmonary nodule is seen. There is mild subsegmental atelectasis at the lung bases. - Probable small left thyroid gland nodule is stable.  Pt presents today for follow up.   **PHQ-2 completed on ...

## 2024-09-04 NOTE — DATA REVIEWED
[FreeTextEntry1] : I have independently reviewed the following: [place] Chest [place] 8[place]30[place]24 [place] [facility] [place]

## 2024-09-04 NOTE — DATA REVIEWED
[FreeTextEntry1] : I have independently reviewed the following: CT Chest on 8/30/24 at [facility] [place]

## 2024-09-04 NOTE — ASSESSMENT
[FreeTextEntry1] : Mr. JESUS BRENNAN, 73 year old male, current smoker, poor historian, w/ hx of HTN, stroke, heart disease?, who presented with abnormal CT for lung cancer screening.   CT Chest on 8/30/24 at [facility]: -  no focal consolidation, pleural effusion, pneumothorax or mass.  - In the left lower lobe is a 1.3 cm groundglass nodule without solid component, previously measuring approximately 1.2 cm when measured similarly.  - No new pulmonary nodule is seen. There is mild subsegmental atelectasis at the lung bases. - Probable small left thyroid gland nodule is stable.  I have reviewed the patient's medical records and diagnostic images at time of this office consultation and have made the following recommendation: 1.   I, KENDALL Shultz, personally performed the evaluation and management (E/M) services for this established patient who presents today with (a) new problem(s)/exacerbation of (an) existing condition(s). That E/M includes conducting the examination, assessing all new/exacerbated conditions, and establishing a new plan of care. Today, my ACP, Ida Christie NP was here to observe my evaluation and management services for this new problem/exacerbated condition to be followed going forward.

## 2024-09-04 NOTE — DATA REVIEWED
[FreeTextEntry1] : I have independently reviewed the following[place] [facility] [place]

## 2024-09-04 NOTE — CONSULT LETTER
[FreeTextEntry2] : Dallas Heller MD (PCP/ref)  [FreeTextEntry3] : Mark Velez MD, MPH \par  System Director of Thoracic Surgery \par  Director of Comprehensive Lung and Foregut [place] \par  Professor Cardiovascular & Thoracic Surgery  \par  [facility]\par  \par  [facility]\par  [address]\par  Oncology [address]\[place] [address]\par  Tel: [phone]\par  Fax: [phone]\par

## 2024-09-05 ENCOUNTER — APPOINTMENT (OUTPATIENT)
Dept: THORACIC SURGERY | Facility: CLINIC | Age: 74
End: 2024-09-05
Payer: COMMERCIAL

## 2024-09-05 VITALS
WEIGHT: 124 LBS | HEART RATE: 87 BPM | SYSTOLIC BLOOD PRESSURE: 123 MMHG | RESPIRATION RATE: 16 BRPM | OXYGEN SATURATION: 98 % | BODY MASS INDEX: 20.63 KG/M2 | DIASTOLIC BLOOD PRESSURE: 75 MMHG

## 2024-09-05 DIAGNOSIS — R91.1 SOLITARY PULMONARY NODULE: ICD-10-CM

## 2024-09-05 PROCEDURE — 99204 OFFICE O/P NEW MOD 45 MIN: CPT

## 2024-09-10 ENCOUNTER — NON-APPOINTMENT (OUTPATIENT)
Age: 74
End: 2024-09-10

## 2024-09-10 ENCOUNTER — APPOINTMENT (OUTPATIENT)
Dept: CARDIOLOGY | Facility: CLINIC | Age: 74
End: 2024-09-10
Payer: COMMERCIAL

## 2024-09-10 VITALS
BODY MASS INDEX: 20.63 KG/M2 | OXYGEN SATURATION: 96 % | DIASTOLIC BLOOD PRESSURE: 72 MMHG | HEART RATE: 108 BPM | SYSTOLIC BLOOD PRESSURE: 107 MMHG | WEIGHT: 124 LBS | RESPIRATION RATE: 16 BRPM

## 2024-09-10 DIAGNOSIS — I25.10 ATHEROSCLEROTIC HEART DISEASE OF NATIVE CORONARY ARTERY W/OUT ANGINA PECTORIS: ICD-10-CM

## 2024-09-10 DIAGNOSIS — Z98.61 ATHEROSCLEROTIC HEART DISEASE OF NATIVE CORONARY ARTERY W/OUT ANGINA PECTORIS: ICD-10-CM

## 2024-09-10 PROCEDURE — 93000 ELECTROCARDIOGRAM COMPLETE: CPT

## 2024-09-10 PROCEDURE — G2211 COMPLEX E/M VISIT ADD ON: CPT

## 2024-09-10 PROCEDURE — 99205 OFFICE O/P NEW HI 60 MIN: CPT

## 2024-09-10 RX ORDER — ROSUVASTATIN CALCIUM 20 MG/1
20 TABLET, FILM COATED ORAL DAILY
Qty: 90 | Refills: 3 | Status: ACTIVE | COMMUNITY
Start: 2024-09-10 | End: 1900-01-01

## 2024-09-10 RX ORDER — TICAGRELOR 90 MG/1
90 TABLET ORAL TWICE DAILY
Qty: 180 | Refills: 3 | Status: ACTIVE | COMMUNITY
Start: 2024-09-10 | End: 1900-01-01

## 2024-09-10 RX ORDER — HYDROCHLOROTHIAZIDE 50 MG/1
50 TABLET ORAL DAILY
Qty: 90 | Refills: 3 | Status: ACTIVE | COMMUNITY
Start: 2024-09-10 | End: 1900-01-01

## 2024-09-10 RX ORDER — ASPIRIN ENTERIC COATED TABLETS 81 MG 81 MG/1
81 TABLET, DELAYED RELEASE ORAL DAILY
Qty: 90 | Refills: 3 | Status: ACTIVE | COMMUNITY
Start: 2024-09-10 | End: 1900-01-01

## 2024-09-10 NOTE — HISTORY OF PRESENT ILLNESS
[FreeTextEntry1] : 73M with PMH of CAD s/p PCI to LAD and RCA in 3/2024 with residual Cx/OM disease, HTN, HLD, and lung nodule. He is pending Left VATS, RA, LLL segmentectomy, possible lobectomy, and MLND with Dr. Mark Velez on 10/8 due to enlarging lung nodule. Here for preop cardiac clearance.   Cardiac hx  3/2024 in [place]- angiogram reviewed. S/p PCI to RCA and LAD. Has a moderate-severe lesion in mCx/OM1 unrevascularized.  7/2024- had exercise stress test and TTE at Dr. Austyn Garcia office= exercised 7 min 37 sec on modified asif protocol, achieved 8.6 METS, stress EKG was abnormal however pt did not have significant symptoms. TTE with normal EF, mild diastolic dysfunction, trace AR, mild TR and IN.   Medication ASA 81 mg qd, Brilinta 90 mg BID, losartan 100 mg QD, Hctz 50 mg QD, amlodipine 10 mg QD, Toprol XL 50 mg QD, crestor 20 mg QHS and prilosec 40 mg QD   EKG NSR, LVH, no significant ST-T changes   Surgical hx + gastrectomy   Social hx + current every day smoker  Assessment and Plan 1. Lung nodule, scheduled for Left VATS, RA, LLL segmentectomy, possible lobectomy, and MLND w/ Dr. Velez  2. CAD s/p PCI to RCA and LAD, mod-severe Cx-OM lesion.  3. HTN  4. HLD  5. Pre diabete (A1c 6.4)   -pt completed >8 METS on recent stress, no significant ischemic symptoms, TTE with normal EF and mild valvular pathology. Given the enlarging lung nodule and suspicion for lung malignancy, pt is optimized from cardiac standpoint for the planned procedure.  -pt will follow up with me post lung procedure in late Oct for further work up of his mod-severe Cx-OM lesion -last PCI in 3/2024, cont DAPT, would continue ASA through perioperative period. Instructted pt to stop Brilinta 5-7 days prior to surgery date.  -cont toprol XL 50 mg QD, losartan 100 mg QD, Hctz 50 mg QD, and norvasc 10 mg QD  -cont crestor 20 mg QHS for HLD   During non face-to-face time, I reviewed relevant portions of the patient's medical record. During face-to-face time, I took a relevant history and examined the patient. I also explained differential diagnoses, relevant cardiac diagnoses, workup, and management plan, which required a moderate level of medical decision making. I answered all questions related to the patient's medical conditions.  Ce Gutiérrez MD [facility]  Attending Interventional Cardiologist and General Cardiologist  [facility]

## 2024-09-12 ENCOUNTER — NON-APPOINTMENT (OUTPATIENT)
Age: 74
End: 2024-09-12

## 2024-09-12 RX ORDER — OMEPRAZOLE 40 MG/1
40 CAPSULE, DELAYED RELEASE ORAL
Refills: 0 | Status: ACTIVE | COMMUNITY

## 2024-09-12 RX ORDER — EZETIMIBE 10 MG/1
10 TABLET ORAL
Refills: 0 | Status: ACTIVE | COMMUNITY

## 2024-09-12 RX ORDER — DONEPEZIL HYDROCHLORIDE 5 MG/1
5 TABLET ORAL
Refills: 0 | Status: ACTIVE | COMMUNITY

## 2024-09-12 RX ORDER — ICOSAPENT ETHYL 1 G/1
1 CAPSULE ORAL
Refills: 0 | Status: ACTIVE | COMMUNITY

## 2024-09-23 NOTE — ASSESSMENT
[FreeTextEntry1] : Mr. JESUS BRENNAN, 73 year old male, current smoker, poor historian, w/ hx of HTN, stroke, heart disease?, who presented with abnormal CT for lung cancer screening.     I have reviewed the patient's medical records and diagnostic images at time of this office consultation and have made the following recommendation: 1.   I, KENDALL Shultz, personally performed the evaluation and management (E/M) services for this established patient who presents today with (a) new problem(s)/exacerbation of (an) existing condition(s).  That E/M includes conducting the examination, assessing all new/exacerbated conditions, and establishing a new plan of care.  Today, my ACP, Brittney Mojica, ZULEMA-BC was here to observe my evaluation and management services for this new problem/exacerbated condition to be followed going forward.

## 2024-09-23 NOTE — HISTORY OF PRESENT ILLNESS
[FreeTextEntry1] : Mr. JESUS BRENNAN, 73 year old male, current smoker, poor historian, w/ hx of HTN, stroke, heart disease?, who presented with abnormal CT for lung cancer screening.   CT Chest on 12/6/22 at [facility]: - stable 1.1cm LLL ggo (4:127), w/o solid component - no new nodules  CT Chest on 6/6/23: stable 1.1 cm ggo in LLL (3:135)w/o solid component  CT Chest on 8/30/24 at [facility]: - no focal consolidation, pleural effusion, pneumothorax or mass. - In the left lower lobe is a 1.3 cm groundglass nodule without solid component, previously measuring approximately 1.2 cm when measured similarly. - No new pulmonary nodule is seen. There is mild subsegmental atelectasis at the lung bases. - Probable small left thyroid gland nodule is stable.  **PHQ-2 completed on 09/05/2024: Positive. Recommended to f/u with PCP. No suicidal ideation at current time.  Recommended Left VATS, RA, LLL segmentectomy, possible lobectomy, MLND on 10/08/2024.  PET/CT ...   PFTs....  Pt presents today for follow up.

## 2024-09-23 NOTE — CONSULT LETTER
[FreeTextEntry2] : Dallas Heller MD (PCP/ref)  [FreeTextEntry3] : Mark Velez MD, MPH \par  System Director of Thoracic Surgery \par  Director of Comprehensive Lung and Foregut [place] \par  Professor Cardiovascular & Thoracic Surgery  \par  [facility]\par  \par  [facility]\par  [address]\par  Oncology [address]\[place] [address]\par  Tel: [phone]\par  Fax: [phone]\par

## 2024-09-23 NOTE — CONSULT LETTER
[FreeTextEntry2] : Dallas Heller MD (PCP/ref)  [FreeTextEntry3] : Mark Velez MD, MPH \par  System Director of Thoracic Surgery \par  Director of Comprehensive Lung and Foregut [place] \par  Professor Cardiovascular & Thoracic Surgery  \par  [facility] at [facility]\par  \par  [facility]\par  [address]\par  Oncology [address]\[place] [address]\par  Tel: [phone]\par  Fax: [phone]\par

## 2024-09-24 ENCOUNTER — OUTPATIENT (OUTPATIENT)
Dept: OUTPATIENT SERVICES | Facility: HOSPITAL | Age: 74
LOS: 1 days | End: 2024-09-24

## 2024-09-24 VITALS
DIASTOLIC BLOOD PRESSURE: 74 MMHG | SYSTOLIC BLOOD PRESSURE: 140 MMHG | TEMPERATURE: 98 F | HEIGHT: 65 IN | WEIGHT: 130.07 LBS | OXYGEN SATURATION: 97 % | HEART RATE: 93 BPM | RESPIRATION RATE: 16 BRPM

## 2024-09-24 DIAGNOSIS — Z91.89 OTHER SPECIFIED PERSONAL RISK FACTORS, NOT ELSEWHERE CLASSIFIED: ICD-10-CM

## 2024-09-24 DIAGNOSIS — R91.1 SOLITARY PULMONARY NODULE: ICD-10-CM

## 2024-09-24 DIAGNOSIS — I10 ESSENTIAL (PRIMARY) HYPERTENSION: ICD-10-CM

## 2024-09-24 DIAGNOSIS — I25.10 ATHEROSCLEROTIC HEART DISEASE OF NATIVE CORONARY ARTERY WITHOUT ANGINA PECTORIS: ICD-10-CM

## 2024-09-24 DIAGNOSIS — Z98.49 CATARACT EXTRACTION STATUS, UNSPECIFIED EYE: Chronic | ICD-10-CM

## 2024-09-24 DIAGNOSIS — Z98.61 CORONARY ANGIOPLASTY STATUS: Chronic | ICD-10-CM

## 2024-09-24 DIAGNOSIS — Z98.890 OTHER SPECIFIED POSTPROCEDURAL STATES: Chronic | ICD-10-CM

## 2024-09-24 LAB
BASOPHILS # BLD AUTO: 0.03 K/UL — SIGNIFICANT CHANGE UP (ref 0–0.2)
BASOPHILS NFR BLD AUTO: 0.6 % — SIGNIFICANT CHANGE UP (ref 0–2)
BLD GP AB SCN SERPL QL: NEGATIVE — SIGNIFICANT CHANGE UP
EOSINOPHIL # BLD AUTO: 0.07 K/UL — SIGNIFICANT CHANGE UP (ref 0–0.5)
EOSINOPHIL NFR BLD AUTO: 1.4 % — SIGNIFICANT CHANGE UP (ref 0–6)
HCT VFR BLD CALC: 40 % — SIGNIFICANT CHANGE UP (ref 39–50)
HGB BLD-MCNC: 13.4 G/DL — SIGNIFICANT CHANGE UP (ref 13–17)
IMM GRANULOCYTES NFR BLD AUTO: 0.4 % — SIGNIFICANT CHANGE UP (ref 0–0.9)
LYMPHOCYTES # BLD AUTO: 1.09 K/UL — SIGNIFICANT CHANGE UP (ref 1–3.3)
LYMPHOCYTES # BLD AUTO: 22.1 % — SIGNIFICANT CHANGE UP (ref 13–44)
MCHC RBC-ENTMCNC: 29.7 PG — SIGNIFICANT CHANGE UP (ref 27–34)
MCHC RBC-ENTMCNC: 33.5 GM/DL — SIGNIFICANT CHANGE UP (ref 32–36)
MCV RBC AUTO: 88.7 FL — SIGNIFICANT CHANGE UP (ref 80–100)
MONOCYTES # BLD AUTO: 0.78 K/UL — SIGNIFICANT CHANGE UP (ref 0–0.9)
MONOCYTES NFR BLD AUTO: 15.8 % — HIGH (ref 2–14)
NEUTROPHILS # BLD AUTO: 2.94 K/UL — SIGNIFICANT CHANGE UP (ref 1.8–7.4)
NEUTROPHILS NFR BLD AUTO: 59.7 % — SIGNIFICANT CHANGE UP (ref 43–77)
PLATELET # BLD AUTO: 151 K/UL — SIGNIFICANT CHANGE UP (ref 150–400)
RBC # BLD: 4.51 M/UL — SIGNIFICANT CHANGE UP (ref 4.2–5.8)
RBC # FLD: 13.8 % — SIGNIFICANT CHANGE UP (ref 10.3–14.5)
RH IG SCN BLD-IMP: POSITIVE — SIGNIFICANT CHANGE UP
RH IG SCN BLD-IMP: POSITIVE — SIGNIFICANT CHANGE UP
WBC # BLD: 4.93 K/UL — SIGNIFICANT CHANGE UP (ref 3.8–10.5)
WBC # FLD AUTO: 4.93 K/UL — SIGNIFICANT CHANGE UP (ref 3.8–10.5)

## 2024-09-24 RX ORDER — MULTI VITAMIN/MINERAL SUPPLEMENT WITH ASCORBIC ACID, NIACIN, PYRIDOXINE, PANTOTHENIC ACID, FOLIC ACID, RIBOFLAVIN, THIAMIN, BIOTIN, COBALAMIN AND ZINC. 60; 20; 12.5; 10; 10; 1.7; 1.5; 1; .3; .006 MG/1; MG/1; MG/1; MG/1; MG/1; MG/1; MG/1; MG/1; MG/1; MG/1
1 TABLET, COATED ORAL
Refills: 0 | DISCHARGE

## 2024-09-24 RX ORDER — AMLODIPINE BESYLATE 5 MG
1 TABLET ORAL
Refills: 0 | DISCHARGE

## 2024-09-24 RX ORDER — ROSUVASTATIN CALCIUM 20 MG/1
1 TABLET, COATED ORAL
Refills: 0 | DISCHARGE

## 2024-09-24 RX ORDER — SODIUM CHLORIDE IRRIG SOLUTION 0.9 %
1000 SOLUTION, IRRIGATION IRRIGATION
Refills: 0 | Status: DISCONTINUED | OUTPATIENT
Start: 2024-10-08 | End: 2024-10-08

## 2024-09-24 RX ORDER — LOSARTAN POTASSIUM 100 MG/1
1 TABLET, FILM COATED ORAL
Refills: 0 | DISCHARGE

## 2024-09-24 RX ORDER — METOPROLOL TARTRATE 50 MG
1 TABLET ORAL
Refills: 0 | DISCHARGE

## 2024-09-24 RX ORDER — HYDROCHLOROTHIAZIDE 50 MG/1
1 TABLET ORAL
Refills: 0 | DISCHARGE

## 2024-09-24 RX ORDER — EZETIMIBE 10 MG/1
1 TABLET ORAL
Refills: 0 | DISCHARGE

## 2024-09-24 RX ORDER — ASPIRIN 325 MG
1 TABLET ORAL
Refills: 0 | DISCHARGE

## 2024-09-24 RX ORDER — TICAGRELOR 60 MG/1
1 TABLET ORAL
Refills: 0 | DISCHARGE

## 2024-09-24 NOTE — H&P PST ADULT - NEUROLOGICAL
details… AAO x3, steady gait; residual mild left sided facial palsy/sensation intact/responds to verbal commands

## 2024-09-24 NOTE — H&P PST ADULT - NSICDXPASTSURGICALHX_GEN_ALL_CORE_FT
PAST SURGICAL HISTORY:  H/O abdominal surgery     History of percutaneous coronary intervention     S/P cataract surgery

## 2024-09-24 NOTE — H&P PST ADULT - PROBLEM SELECTOR PLAN 3
Patient instructed to take Losartan, Metoprolol, Amlodipine, Ezetimibe and Rosuvastatin with a sip of water on the morning of procedure. Instructed to hold Hydrochlorothiazide on the morning of surgery.

## 2024-09-24 NOTE — H&P PST ADULT - LAST STRESS TEST
July 2024 - " stress ekg abnormal but patient did not have significant ischemic symptoms" - summary in Cardiology eval from 9/10/24 - In chart

## 2024-09-24 NOTE — H&P PST ADULT - GASTROINTESTINAL COMMENTS
hx of abdominal surgery - exploratory abdominal surgery without any surgical intervention in [place] 1984 - poor historian, unclear why it was done, denies gastrectomy

## 2024-09-24 NOTE — H&P PST ADULT - LAST ECHOCARDIOGRAM
July 2024 - normal left ventricular systolic function, mild diastolic dysfunction, trace AR, mild TR and [place],  in chart

## 2024-09-24 NOTE — H&P PST ADULT - ENDOCRINE COMMENTS
.  VITAL SIGNS:  T(C): 36.7 (07-02-23 @ 12:15), Max: 37 (07-02-23 @ 06:22)  T(F): 98.1 (07-02-23 @ 12:15), Max: 98.6 (07-02-23 @ 06:22)  HR: 81 (07-02-23 @ 15:53) (79 - 89)  BP: 120/80 (07-02-23 @ 12:15) (104/70 - 125/77)  BP(mean): --  RR: 17 (07-02-23 @ 12:15) (17 - 18)  SpO2: 98% (07-02-23 @ 15:53) (93% - 98%)  Wt(kg): --    PHYSICAL EXAM:    Constitutional: WDWN resting comfortably in bed;   Head: NC/AT  Eyes: PERRL, EOMI, clear conjunctiva  ENT: no nasal discharge; uvula midline, no oropharyngeal erythema or exudates;   Neck: supple; no JVD or thyromegaly  Respiratory: CTA B/L; no W/R/R, no retractions  Cardiac: +S1/S2; RRR; no M/R/G; PMI non-displaced  Gastrointestinal: soft, NT/ND; no rebound or guarding; +BSx4  Back: spine midline, no bony tenderness or step-offs; no CVAT B/L  Extremities: WWP, no clubbing or cyanosis; no peripheral edema  Musculoskeletal: NROM x4; no joint swelling, tenderness or erythema  Neurologic: AAOx3; CNII-XII grossly intact; no focal deficits  Psychiatric: affect and characteristics of appearance, verbalizations, behaviors are appropriate
thyroid nodule - stable

## 2024-09-24 NOTE — H&P PST ADULT - FUNCTIONAL STATUS
DASI score 5.07 - able to participate in light to moderate housework, can climb one flight of stairs, carry in small groceries

## 2024-09-24 NOTE — H&P PST ADULT - NSICDXPASTMEDICALHX_GEN_ALL_CORE_FT
PAST MEDICAL HISTORY:  CAD (coronary artery disease)     Current smoker     Hypertension     Left hydrocele     Solitary pulmonary nodule     Stroke

## 2024-09-24 NOTE — H&P PST ADULT - PROBLEM SELECTOR PLAN 1
Patient is tentatively scheduled for Left Video Assisted Thoracoscopy, Robotic Assisted Left Lower Lobe Segmentectomy, Possible Left Lower Lobectomy, Mediastinal Lymph Node Dissection on 10/8/24. Pre-op instructions provided to patient. Patient given verbal and written instructions on Chlorhexidine soap and Pepcid. Patient verbalized understanding.     T&S, ABO, CBC sent at [facility]   CMP in from 9/10/24 in chart

## 2024-09-24 NOTE — H&P PST ADULT - PROBLEM SELECTOR PLAN 2
Patient with history of CAD s/p PCI to LAD and RCA in March 2024. Patient verbalized that he is noncompliant with Aspirin 81mg daily, instructed to continue taking Aspirin perioperatively as per Cardiologist. Instructed to hold Brilinta 5 days prior to surgery, last dose 10/2/24. Patient and daughter verbalized understanding. Last Cardiology evaluation from 9/10/24 in chart.

## 2024-09-24 NOTE — H&P PST ADULT - HISTORY OF PRESENT ILLNESS
73 year old male with past medical history of CAD s/p PCI to LAD and RCA in 3/2024 with residual Cx/OM disease, HTN, HLD, and lung nodule. CT of chest from 8/30/24 showing left lower lobe ground glass nodule increasing in size from prior imaging. Patient is scheduled for Left Video Assisted Thoracoscopy, Robotic Assisted Left Lower Lobe Segmentectomy, Possible Left Lower Lobectomy, Mediastinal Lymph Node Dissection.

## 2024-09-26 ENCOUNTER — APPOINTMENT (OUTPATIENT)
Dept: THORACIC SURGERY | Facility: CLINIC | Age: 74
End: 2024-09-26

## 2024-09-26 DIAGNOSIS — R91.1 SOLITARY PULMONARY NODULE: ICD-10-CM

## 2024-10-07 NOTE — ASU PATIENT PROFILE, ADULT - FALL HARM RISK - UNIVERSAL INTERVENTIONS
FUTURE VISIT INFORMATION      SURGERY INFORMATION:    Date: 22    Location:  gi    Surgeon:  Zack Maddox MD    Anesthesia Type:  MAC    Procedure: ESOPHAGOGASTRODUODENOSCOPY (EGD)    RECORDS REQUESTED FROM:       Primary Care Provider:    Andrew Peck APRN [facility]     Most recent EKG+ Tracin21      
Car
Bed in lowest position, wheels locked, appropriate side rails in place/Call bell, personal items and telephone in reach/Instruct patient to call for assistance before getting out of bed or chair/Non-slip footwear when patient is out of bed/[place] to call system/Physically safe environment - no spills, clutter or unnecessary equipment/Purposeful Proactive Rounding/Room/bathroom lighting operational, light cord in reach

## 2024-10-07 NOTE — ASU PATIENT PROFILE, ADULT - NSSUBSTANCEUSE_GEN_ALL_CORE_SD
Group Topic: BH Process Group     Date: 2/3/2021  Start Time: 11:00 AM  End Time: 12:00 PM  Facilitators: Josette Odell    Focus: Recovery  Number in attendance: 7    Patients were given the opportunity to share individually about goals, current stressors and therapeutic assignments. Group and therapist feedback is welcomed and maladaptive skills are challenged with coping options.      Method: Group  Attendance: Present  Participation: Active  Patient Response: Appropriate feedback and Attentive  Mood: Anxious  Affect: Type: Anxious   Range: Full (normal)   Congruency: Congruent   Stability: Stable  Behavior/Socialization: Appropriate to group and Engaged  Thought Process: Obsessive and Ruminating  Task Performance: Follows directions  Patient Evaluation: Independent - full participation    Patient shared her goals for the evening are \"work, [place] and [place] mom, go to bed on time, carve out self-care time.\" Patient shared that she is feeling anxious about her new medication and discussed her stigma towards medication. Patient shared that she is willing to be patient and take one moment at a time.  Josette Odell, LPC-IT, SAC-IT        never used

## 2024-10-07 NOTE — ASU PATIENT PROFILE, ADULT - NS TRANSFER DISPOSITION PATIENT BELONGINGS
How Severe Is Your Skin Lesion?: mild Has Your Skin Lesion Been Treated?: not been treated Is This A New Presentation, Or A Follow-Up?: Skin Lesion pacu #2

## 2024-10-08 ENCOUNTER — APPOINTMENT (OUTPATIENT)
Dept: THORACIC SURGERY | Facility: HOSPITAL | Age: 74
End: 2024-10-08

## 2024-10-08 ENCOUNTER — RESULT REVIEW (OUTPATIENT)
Age: 74
End: 2024-10-08

## 2024-10-08 ENCOUNTER — INPATIENT (INPATIENT)
Facility: HOSPITAL | Age: 74
LOS: 7 days | Discharge: HOME | End: 2024-10-16
Attending: THORACIC SURGERY (CARDIOTHORACIC VASCULAR SURGERY) | Admitting: THORACIC SURGERY (CARDIOTHORACIC VASCULAR SURGERY)
Payer: MEDICARE

## 2024-10-08 VITALS
RESPIRATION RATE: 18 BRPM | HEIGHT: 65 IN | SYSTOLIC BLOOD PRESSURE: 137 MMHG | WEIGHT: 130.07 LBS | OXYGEN SATURATION: 100 % | HEART RATE: 61 BPM | TEMPERATURE: 98 F | DIASTOLIC BLOOD PRESSURE: 67 MMHG

## 2024-10-08 DIAGNOSIS — Z98.61 CORONARY ANGIOPLASTY STATUS: Chronic | ICD-10-CM

## 2024-10-08 DIAGNOSIS — Z98.890 OTHER SPECIFIED POSTPROCEDURAL STATES: Chronic | ICD-10-CM

## 2024-10-08 DIAGNOSIS — Z98.49 CATARACT EXTRACTION STATUS, UNSPECIFIED EYE: Chronic | ICD-10-CM

## 2024-10-08 DIAGNOSIS — R91.1 SOLITARY PULMONARY NODULE: ICD-10-CM

## 2024-10-08 PROCEDURE — 32674 THORACOSCOPY LYMPH NODE EXC: CPT | Mod: AS

## 2024-10-08 PROCEDURE — 71045 X-RAY EXAM CHEST 1 VIEW: CPT | Mod: 26

## 2024-10-08 PROCEDURE — 32667 THORACOSCOPY W/W RESECT ADDL: CPT | Mod: LT,59

## 2024-10-08 PROCEDURE — 88309 TISSUE EXAM BY PATHOLOGIST: CPT | Mod: 26

## 2024-10-08 PROCEDURE — S2900 ROBOTIC SURGICAL SYSTEM: CPT | Mod: NC

## 2024-10-08 PROCEDURE — 88331 PATH CONSLTJ SURG 1 BLK 1SPC: CPT | Mod: 26

## 2024-10-08 PROCEDURE — 32663 THORACOSCOPY W/LOBECTOMY: CPT | Mod: LT

## 2024-10-08 PROCEDURE — 32674 THORACOSCOPY LYMPH NODE EXC: CPT

## 2024-10-08 PROCEDURE — 88307 TISSUE EXAM BY PATHOLOGIST: CPT | Mod: 26

## 2024-10-08 PROCEDURE — 88313 SPECIAL STAINS GROUP 2: CPT | Mod: 26

## 2024-10-08 PROCEDURE — 99291 CRITICAL CARE FIRST HOUR: CPT

## 2024-10-08 PROCEDURE — 32663 THORACOSCOPY W/LOBECTOMY: CPT | Mod: AS,LT

## 2024-10-08 PROCEDURE — 32666 THORACOSCOPY W/WEDGE RESECT: CPT | Mod: LT,59

## 2024-10-08 PROCEDURE — 32667 THORACOSCOPY W/W RESECT ADDL: CPT | Mod: AS,LT,59

## 2024-10-08 PROCEDURE — 32666 THORACOSCOPY W/WEDGE RESECT: CPT | Mod: AS,LT,59

## 2024-10-08 PROCEDURE — 88305 TISSUE EXAM BY PATHOLOGIST: CPT | Mod: 26

## 2024-10-08 DEVICE — STAPLER COVIDIEN TRI-STAPLE 60MM PURPLE RELOAD: Type: IMPLANTABLE DEVICE | Site: LEFT | Status: FUNCTIONAL

## 2024-10-08 DEVICE — CHEST DRAIN THORACIC ARGYLE PVC 28FR STRAIGHT: Type: IMPLANTABLE DEVICE | Site: LEFT | Status: FUNCTIONAL

## 2024-10-08 DEVICE — SURGICEL 2 X 14": Type: IMPLANTABLE DEVICE | Site: LEFT | Status: FUNCTIONAL

## 2024-10-08 DEVICE — STAPLER COVIDIEN TRI-STAPLE 45MM PURPLE RELOAD: Type: IMPLANTABLE DEVICE | Site: LEFT | Status: FUNCTIONAL

## 2024-10-08 DEVICE — STAPLER COVIDIEN TRI-STAPLE CURVED 30MM TAN RELOAD: Type: IMPLANTABLE DEVICE | Site: LEFT | Status: FUNCTIONAL

## 2024-10-08 DEVICE — STAPLER COVIDIEN TRI-STAPLE 45MM BLACK RELOAD: Type: IMPLANTABLE DEVICE | Site: LEFT | Status: FUNCTIONAL

## 2024-10-08 DEVICE — STAPLER COVIDIEN TRI-STAPLE CURVED 45MM TAN RELOAD: Type: IMPLANTABLE DEVICE | Site: LEFT | Status: FUNCTIONAL

## 2024-10-08 DEVICE — STAPLER COVIDIEN TRI-STAPLE CURVED 45MM PURPLE RELOAD: Type: IMPLANTABLE DEVICE | Site: LEFT | Status: FUNCTIONAL

## 2024-10-08 DEVICE — STAPLER COVIDIEN TRI-STAPLE 60MM BLACK RELOAD: Type: IMPLANTABLE DEVICE | Site: LEFT | Status: FUNCTIONAL

## 2024-10-08 RX ORDER — ONDANSETRON HCL/PF 4 MG/2 ML
4 VIAL (ML) INJECTION EVERY 6 HOURS
Refills: 0 | Status: DISCONTINUED | OUTPATIENT
Start: 2024-10-08 | End: 2024-10-16

## 2024-10-08 RX ORDER — SENNOSIDES 8.6 MG
2 TABLET ORAL AT BEDTIME
Refills: 0 | Status: DISCONTINUED | OUTPATIENT
Start: 2024-10-08 | End: 2024-10-16

## 2024-10-08 RX ORDER — HYDROMORPHONE HYDROCHLORIDE 1 MG/ML
30 INJECTION, SOLUTION INTRAMUSCULAR; INTRAVENOUS; SUBCUTANEOUS
Refills: 0 | Status: DISCONTINUED | OUTPATIENT
Start: 2024-10-08 | End: 2024-10-09

## 2024-10-08 RX ORDER — EZETIMIBE 10 MG/1
1 TABLET ORAL
Refills: 0 | DISCHARGE

## 2024-10-08 RX ORDER — SODIUM CHLORIDE IRRIG SOLUTION 0.9 %
1000 SOLUTION, IRRIGATION IRRIGATION
Refills: 0 | Status: DISCONTINUED | OUTPATIENT
Start: 2024-10-08 | End: 2024-10-09

## 2024-10-08 RX ORDER — ACETAMINOPHEN 325 MG
975 TABLET ORAL ONCE
Refills: 0 | Status: COMPLETED | OUTPATIENT
Start: 2024-10-08 | End: 2024-10-08

## 2024-10-08 RX ORDER — NALBUPHINE HYDROCHLORIDE 10 MG/ML
2.5 INJECTION, SOLUTION INTRAMUSCULAR; INTRAVENOUS; SUBCUTANEOUS EVERY 6 HOURS
Refills: 0 | Status: DISCONTINUED | OUTPATIENT
Start: 2024-10-08 | End: 2024-10-16

## 2024-10-08 RX ORDER — ROSUVASTATIN CALCIUM 20 MG/1
1 TABLET, COATED ORAL
Refills: 0 | DISCHARGE

## 2024-10-08 RX ORDER — FENTANYL CITRATE-0.9 % NACL/PF 300MCG/30
50 PATIENT CONTROLLED ANALGESIA VIAL INJECTION ONCE
Refills: 0 | Status: DISCONTINUED | OUTPATIENT
Start: 2024-10-08 | End: 2024-10-08

## 2024-10-08 RX ORDER — HYDROMORPHONE HYDROCHLORIDE 1 MG/ML
0.5 INJECTION, SOLUTION INTRAMUSCULAR; INTRAVENOUS; SUBCUTANEOUS
Refills: 0 | Status: DISCONTINUED | OUTPATIENT
Start: 2024-10-08 | End: 2024-10-09

## 2024-10-08 RX ORDER — MULTI VITAMIN/MINERAL SUPPLEMENT WITH ASCORBIC ACID, NIACIN, PYRIDOXINE, PANTOTHENIC ACID, FOLIC ACID, RIBOFLAVIN, THIAMIN, BIOTIN, COBALAMIN AND ZINC. 60; 20; 12.5; 10; 10; 1.7; 1.5; 1; .3; .006 MG/1; MG/1; MG/1; MG/1; MG/1; MG/1; MG/1; MG/1; MG/1; MG/1
1 TABLET, COATED ORAL
Refills: 0 | DISCHARGE

## 2024-10-08 RX ORDER — NALOXONE HYDROCHLORIDE 0.4 MG/ML
0.1 INJECTION, SOLUTION INTRAMUSCULAR; INTRAVENOUS; SUBCUTANEOUS
Refills: 0 | Status: DISCONTINUED | OUTPATIENT
Start: 2024-10-08 | End: 2024-10-16

## 2024-10-08 RX ORDER — ACETAMINOPHEN 325 MG
1000 TABLET ORAL EVERY 6 HOURS
Refills: 0 | Status: DISCONTINUED | OUTPATIENT
Start: 2024-10-08 | End: 2024-10-09

## 2024-10-08 RX ADMIN — Medication 30 MILLILITER(S): at 19:21

## 2024-10-08 RX ADMIN — Medication 400 MILLIGRAM(S): at 19:43

## 2024-10-08 RX ADMIN — Medication 17 GRAM(S): at 22:21

## 2024-10-08 RX ADMIN — Medication 50 MICROGRAM(S): at 19:45

## 2024-10-08 RX ADMIN — HYDROMORPHONE HYDROCHLORIDE 30 MILLILITER(S): 1 INJECTION, SOLUTION INTRAMUSCULAR; INTRAVENOUS; SUBCUTANEOUS at 19:22

## 2024-10-08 RX ADMIN — Medication 975 MILLIGRAM(S): at 13:43

## 2024-10-08 RX ADMIN — Medication 30 MILLILITER(S): at 13:43

## 2024-10-08 RX ADMIN — Medication 1000 MILLIGRAM(S): at 19:45

## 2024-10-08 RX ADMIN — Medication 975 MILLIGRAM(S): at 13:56

## 2024-10-08 RX ADMIN — Medication 2 TABLET(S): at 22:21

## 2024-10-08 RX ADMIN — HYDROMORPHONE HYDROCHLORIDE 0.5 MILLIGRAM(S): 1 INJECTION, SOLUTION INTRAMUSCULAR; INTRAVENOUS; SUBCUTANEOUS at 19:23

## 2024-10-08 RX ADMIN — Medication 50 MICROGRAM(S): at 19:48

## 2024-10-08 RX ADMIN — Medication 5000 UNIT(S): at 13:43

## 2024-10-08 NOTE — BRIEF OPERATIVE NOTE - FIRST ASSIST PARTICIPATION DETAILS - (COMPONENTS OF THE PROCEDURE THAT ASSISTANT PARTICIPATED IN)
Malcolm acted within this role throughout the entirety of the procedure performed by the primary surgeon

## 2024-10-08 NOTE — BRIEF OPERATIVE NOTE - SPECIMENS
LLL wedge x2, LLL basilar segment, MLN 5, 7, 9, 10, 11, 12 LLL wedge x2, LLLobe, MLN 5, 7, 9, 10, 11, 12

## 2024-10-08 NOTE — ASU PREOP CHECKLIST - WAS PATIENT ON BETA BLOCKER?
Per Moy Hussein Admissions Coordinator at [facility] , she will have their wound care nurse come to hospital for a face to face evaluation.    Discussed with Moy patient now qualifies for skilled services. Asked if [facility] is in network with [facility]. Per Moy they are not. Per [facility] website there are 7 facilities that are in network. Referrals including therapy notes faxed to all 7.   [facility]  [facility]    Will seek skilled placement , with the need of  permanent placement at one of the above facilities.   Yes

## 2024-10-08 NOTE — PROGRESS NOTE ADULT - SUBJECTIVE AND OBJECTIVE BOX
KERI BRENNAN  [id number]  Patient is a 73y old  Male who presents with a chief complaint of "I'm having a left lung resection" (24 Sep 2024 13:09)    HPI:  73 year old male with past medical history of CAD s/p PCI to LAD and RCA in 3/2024 with residual Cx/OM disease, HTN, HLD, and lung nodule. CT of chest from 8/30/24 showing left lower lobe ground glass nodule increasing in size from prior imaging. Patient is scheduled for Left Video Assisted Thoracoscopy, Robotic Assisted Left Lower Lobe Segmentectomy, Possible Left Lower Lobectomy, Mediastinal Lymph Node Dissection.  (24 Sep 2024 13:09)      Surgery/Hospital course:  date/OR    brought to icu from OR ,Extubated  PCA for pain  drips : insuline and norepinephrine   BP labile, fluid resuscitated , titrate pressors     Physical Exam:  Gen: A,Ox3    CNS: Non focal  sedated post anesthesia  Neck: no JVD  RES : clear , no wheezing    Chest:   + chest tubes                     CVS: Regular  rhythm. Normal S1/S2  Abd: Soft, non-distended. Bowel sounds present.  Skin: No rash.  Ext:  no edema    Vital Signs Last 24 Hrs  T(C): 37 (08 Oct 2024 20:00), Max: 37 (08 Oct 2024 20:00)  T(F): 98.6 (08 Oct 2024 20:00), Max: 98.6 (08 Oct 2024 20:00)  HR: 91 (08 Oct 2024 22:00) (61 - 91)  BP: 142/79 (08 Oct 2024 22:00) (128/67 - 163/78)  BP(mean): 99 (08 Oct 2024 22:00) (80 - 100)  RR: 18 (08 Oct 2024 22:00) (10 - 22)  SpO2: 98% (08 Oct 2024 22:00) (93% - 100%)    Parameters below as of 08 Oct 2024 22:00  Patient On (Oxygen Delivery Method): room air      ============================I/O===========================   I&O's Detail    08 Oct 2024 07:01  -  08 Oct 2024 22:25  --------------------------------------------------------  IN:    IV PiggyBack: 100 mL    Lactated Ringers: 120 mL  Total IN: 220 mL    OUT:    Chest Tube (mL): 20 mL  Total OUT: 20 mL    Total NET: 200 mL          =====================Rad/Cardio/cultures =================  CXR: Reviewed  Echo:Reviewed  Culture: Reviewed  ======================================================  Home Medications:  amLODIPine 10 mg oral tablet: 1 tab(s) orally once a day (08 Oct 2024 13:22)  aspirin 81 mg oral tablet: 1 tab(s) orally once a day (08 Oct 2024 13:22)  Brilinta (ticagrelor) 90 mg oral tablet: 1 tab(s) orally 2 times a day (08 Oct 2024 13:22)  ezetimibe 10 mg oral tablet: 1 tab(s) orally once a day (08 Oct 2024 13:22)  hydroCHLOROthiazide 25 mg oral tablet: 1 tab(s) orally once a day (08 Oct 2024 13:22)  losartan 100 mg oral tablet: 1 tab(s) orally once a day (08 Oct 2024 13:22)  metoprolol succinate 50 mg oral capsule, extended release: 1 cap(s) orally once a day (08 Oct 2024 13:22)  Multiple Vitamins oral tablet: 1 tab(s) orally once a day last dose 9/30/24 (08 Oct 2024 13:22)  rosuvastatin 20 mg oral capsule: 1 cap(s) orally once a day (08 Oct 2024 13:22)    PAST MEDICAL & SURGICAL HISTORY:  CAD (coronary artery disease)      Hypertension      Solitary pulmonary nodule      Current smoker      Stroke      Left hydrocele      H/O abdominal surgery      History of percutaneous coronary intervention      S/P cataract surgery        ====================ASSESMENT/MDM ==============  Dx:  OR  post op pain  chest tube in place  pneumothorax air leak  pleural effusion    Congestive heart failure acute chronic systolic diastolic combined  hemodynamic instability  arrhythmia   CAD/ASHD  Acute Respiratory insuficiency post op  hypovolemia fluid overload  TAYE CKD ESRD/HD  Anemia blood loss    Hyperglycemia  Diabetes mellitus type 2  Hypothyroidism     infection sepsis     encounter weaning from and management of  respirator  encounter management of temporary Pacing  and wires interogation, VVI   encounter IABP management  encounter ECMO management    Plan:  ====================== NEUROLOGY============  MS ,exam  pain control:     acetaminophen   IVPB .. 1000 milliGRAM(s) IV Intermittent every 6 hours PRN Mild Pain (1 - 3), Moderate Pain (4 - 6)  HYDROmorphone PCA (1 mG/mL) 30 milliLiter(s) PCA Continuous PCA Continuous  HYDROmorphone PCA (1 mG/mL) Rescue Clinician Bolus 0.5 milliGRAM(s) IV Push every 15 minutes PRN for Pain Scale GREATER THAN 6  nalbuphine Injectable 2.5 milliGRAM(s) IV Push every 6 hours PRN Pruritus  ondansetron Injectable 4 milliGRAM(s) IV Push every 6 hours PRN Nausea    ====================== RESPIRATORY============  O2: NC CPAP BIPAP HF VENT / settings    CXR:  Mechanical Ventilation:      incentive spirometry  titrate FiO2 , keep sat above 92%  Monitor chest tube: outputs , Air leak,   continue chest tube to  suction / water seal , f/u cxr  chest tube clamp trial      bronchodilators , inhaled dornase alpha and hypertonic saline    ======================CARDIOVASCULAR =========  Monitor Hemodynamic, Telemetry  hemodynamics :   Lactate :                    pressors: Levophed  Vasopressin Neosynphrine infusion, Midodrine , Droxidopa  inotropes: Epinephrine  Dobutamine  Primacor       titrate pressors for mean BP above 60  titrate inotropes     =======================Hematology===============  monitor  Hb/Hct ,plts ,coags             Hct:  Plts:  INR    antiplatets: ASA, Plavix iv Cangelor  Anticoagulation:  Heparin Argatroban Angiomax Lovenox  VTE PPX:   heparin   Injectable 5000 Unit(s) SubCutaneous every 12 hours      ========================RENAL ===================  Monitor electrolytes, bun/creatinine          Diuresis  Iv Fluids:  Replete lytes PRN  Jimenez Void     ======================== GASTROINTESTINAL========  Diet: NPO  / Reg  GI prophylaxis :PPI  Pepcid  Bowel regimen:    lactated ringers. 1000 milliLiter(s) (30 mL/Hr) IV Continuous <Continuous>  polyethylene glycol 3350 17 Gram(s) Oral at bedtime  senna 2 Tablet(s) Oral at bedtime    ======================= ENDOCRINE  =============  HbA1c:  Glycemic control : insulin drip  , Lantus/NPH, Lispro sliding scale  statin   Hypothyroid : synthroid TSH      ========================INFECTIOUS DISEASE========   prophylactic antibiotics:  treatment antibiotics:  blood cx:  sputum cx:  urine cx:      -----------------------------------------------------------------------------------------------------------  ALL MEDICATIONS  MEDICATIONS  (STANDING):  heparin   Injectable 5000 Unit(s) SubCutaneous every 12 hours  HYDROmorphone PCA (1 mG/mL) 30 milliLiter(s) PCA Continuous PCA Continuous  lactated ringers. 1000 milliLiter(s) (30 mL/Hr) IV Continuous <Continuous>  polyethylene glycol 3350 17 Gram(s) Oral at bedtime  senna 2 Tablet(s) Oral at bedtime    MEDICATIONS  (PRN):  acetaminophen   IVPB .. 1000 milliGRAM(s) IV Intermittent every 6 hours PRN Mild Pain (1 - 3), Moderate Pain (4 - 6)  HYDROmorphone PCA (1 mG/mL) Rescue Clinician Bolus 0.5 milliGRAM(s) IV Push every 15 minutes PRN for Pain Scale GREATER THAN 6  nalbuphine Injectable 2.5 milliGRAM(s) IV Push every 6 hours PRN Pruritus  naloxone Injectable 0.1 milliGRAM(s) IV Push every 3 minutes PRN For ANY of the following changes in patient status:  A. RR LESS THAN 10 breaths per minute, B. Oxygen saturation LESS THAN 90%, C. Sedation score of 6  ondansetron Injectable 4 milliGRAM(s) IV Push every 6 hours PRN Nausea    ------------------------------------------------------------------------------------------------------    Pertinent clinical, laboratory, radiographic, hemodynamic, echocardiographic, respiratory data, microbiologic data and chart were reviewed and analyzed frequently throughout the course of the day and night. Patient seen, examined and plan discussed with CT Surgeon Dr. Velez  / CTICU team during rounds.  OOB to chair and ambulate with physical therapy as tolerated.   .crit    continue ICU care , transfer to floor    Desi Dennis MD  Intensivist  [facility]       KERI BRENNAN  [id number]  Patient is a 73y old  Male who presents with a chief complaint of "I'm having a left lung resection" (24 Sep 2024 13:09)    HPI:  73 year old male with past medical history of CAD s/p PCI to LAD and RCA in 3/2024 with residual Cx/OM disease, HTN, HLD, and lung nodule. CT of chest from 8/30/24 showing left lower lobe ground glass nodule increasing in size from prior imaging. Patient is scheduled for Left Video Assisted Thoracoscopy, Robotic Assisted Left Lower Lobe Segmentectomy, Possible Left Lower Lobectomy, Mediastinal Lymph Node Dissection.  (24 Sep 2024 13:09)      Surgery/Hospital course: 10/8/2024 FB, Robo LVATS, LLL wedge x2, Completion LLLobectomy, MLND    brought to icu from OR ,Extubated  severe incisional pain, iv fentanyl in addition to PCA for pain  + air leak , chest tube to suction     Physical Exam:  Gen: A,Ox3    CNS: Non focal  sedated post anesthesia  Neck: no JVD  RES : clear , no wheezing    Chest:   + chest tubes                     CVS: Regular  rhythm. Normal S1/S2  Abd: Soft, non-distended. Bowel sounds present.  Skin: No rash.  Ext:  no edema    Vital Signs Last 24 Hrs  T(C): 37 (08 Oct 2024 20:00), Max: 37 (08 Oct 2024 20:00)  T(F): 98.6 (08 Oct 2024 20:00), Max: 98.6 (08 Oct 2024 20:00)  HR: 91 (08 Oct 2024 22:00) (61 - 91)  BP: 142/79 (08 Oct 2024 22:00) (128/67 - 163/78)  BP(mean): 99 (08 Oct 2024 22:00) (80 - 100)  RR: 18 (08 Oct 2024 22:00) (10 - 22)  SpO2: 98% (08 Oct 2024 22:00) (93% - 100%)    Parameters below as of 08 Oct 2024 22:00  Patient On (Oxygen Delivery Method): room air      ============================I/O===========================   I&O's Detail    08 Oct 2024 07:01  -  08 Oct 2024 22:25  --------------------------------------------------------  IN:    IV PiggyBack: 100 mL    Lactated Ringers: 120 mL  Total IN: 220 mL    OUT:    Chest Tube (mL): 20 mL  Total OUT: 20 mL    Total NET: 200 mL    =====================Rad/Cardio/cultures =================  CXR: Reviewed  Echo -  Culture -  ======================================================  Home Medications:  amLODIPine 10 mg oral tablet: 1 tab(s) orally once a day (08 Oct 2024 13:22)  aspirin 81 mg oral tablet: 1 tab(s) orally once a day (08 Oct 2024 13:22)  Brilinta (ticagrelor) 90 mg oral tablet: 1 tab(s) orally 2 times a day (08 Oct 2024 13:22)  ezetimibe 10 mg oral tablet: 1 tab(s) orally once a day (08 Oct 2024 13:22)  hydroCHLOROthiazide 25 mg oral tablet: 1 tab(s) orally once a day (08 Oct 2024 13:22)  losartan 100 mg oral tablet: 1 tab(s) orally once a day (08 Oct 2024 13:22)  metoprolol succinate 50 mg oral capsule, extended release: 1 cap(s) orally once a day (08 Oct 2024 13:22)  Multiple Vitamins oral tablet: 1 tab(s) orally once a day last dose 9/30/24 (08 Oct 2024 13:22)  rosuvastatin 20 mg oral capsule: 1 cap(s) orally once a day (08 Oct 2024 13:22)    PAST MEDICAL & SURGICAL HISTORY:  CAD (coronary artery disease)  Hypertension  Solitary pulmonary nodule  Current smoker  Stroke  Left hydrocele  H/O abdominal surgery  History of percutaneous coronary intervention  S/P cataract surgery        ====================ASSESMENT/MDM ==============  10/8/2024 FB, Robo LVATS, LLL wedge x2, Completion LLLobectomy, MLND  Solitary pulmonary nodule  post op pain  chest tube in place  pneumothorax air leak  Current smoker  CAD (coronary artery disease)/stents  Hypertension  Stroke  Left hydrocele  H/O abdominal surgery      Plan:  ====================== NEUROLOGY============  MS AO3   pain control: iv fentanyl/PCA/tylenol    acetaminophen   IVPB .. 1000 milliGRAM(s) IV Intermittent every 6 hours PRN Mild Pain (1 - 3), Moderate Pain (4 - 6)  HYDROmorphone PCA (1 mG/mL) 30 milliLiter(s) PCA Continuous PCA Continuous  HYDROmorphone PCA (1 mG/mL) Rescue Clinician Bolus 0.5 milliGRAM(s) IV Push every 15 minutes PRN for Pain Scale GREATER THAN 6  nalbuphine Injectable 2.5 milliGRAM(s) IV Push every 6 hours PRN Pruritus  ondansetron Injectable 4 milliGRAM(s) IV Push every 6 hours PRN Nausea    ====================== RESPIRATORY============  O2: NC   CXR: reviewed no ptx    incentive spirometry  titrate FiO2 , keep sat above 92%  Monitor chest tube: outputs , +Air leak,   continue chest tube to  suction     ======================CARDIOVASCULAR =========  Monitor Hemodynamic, Telemetry    =======================Hematology===============  monitor  Hb/Hct ,plts ,coags   VTE PPX:   heparin   Injectable 5000 Unit(s) SubCutaneous every 12 hours    ========================RENAL ===================  Monitor electrolytes, bun/creatinine    ======================== GASTROINTESTINAL========  Diet: NPO   GI prophylaxis :PPI  Pepcid  Bowel regimen:    lactated ringers. 1000 milliLiter(s) (30 mL/Hr) IV Continuous <Continuous>  polyethylene glycol 3350 17 Gram(s) Oral at bedtime  senna 2 Tablet(s) Oral at bedtime    ======================= ENDOCRINE  =============    ========================INFECTIOUS DISEASE========   not on antibiotics      -----------------------------------------------------------------------------------------------------------  ALL MEDICATIONS  MEDICATIONS  (STANDING):  heparin   Injectable 5000 Unit(s) SubCutaneous every 12 hours  HYDROmorphone PCA (1 mG/mL) 30 milliLiter(s) PCA Continuous PCA Continuous  lactated ringers. 1000 milliLiter(s) (30 mL/Hr) IV Continuous <Continuous>  polyethylene glycol 3350 17 Gram(s) Oral at bedtime  senna 2 Tablet(s) Oral at bedtime    MEDICATIONS  (PRN):  acetaminophen   IVPB .. 1000 milliGRAM(s) IV Intermittent every 6 hours PRN Mild Pain (1 - 3), Moderate Pain (4 - 6)  HYDROmorphone PCA (1 mG/mL) Rescue Clinician Bolus 0.5 milliGRAM(s) IV Push every 15 minutes PRN for Pain Scale GREATER THAN 6  nalbuphine Injectable 2.5 milliGRAM(s) IV Push every 6 hours PRN Pruritus  naloxone Injectable 0.1 milliGRAM(s) IV Push every 3 minutes PRN For ANY of the following changes in patient status:  A. RR LESS THAN 10 breaths per minute, B. Oxygen saturation LESS THAN 90%, C. Sedation score of 6  ondansetron Injectable 4 milliGRAM(s) IV Push every 6 hours PRN Nausea    ------------------------------------------------------------------------------------------------------    Pertinent clinical, laboratory, radiographic, hemodynamic, echocardiographic, respiratory data, microbiologic data and chart were reviewed and analyzed frequently throughout the course of the day and night. Patient seen, examined and plan discussed with CT Surgeon Dr. Velez  / CTICU team during rounds.  Patient requires continuous monitoring with:  bedside rhythm monitoring, continuous  pulse oximetry monitoring, O2 supplement titrate for O2 sat above 90%  ;                       patient remain critical, at risk for life threatening decompensation; required more than usual post op care;   I have spent 35 minutes providing non routine post op care, revaluated multiple times.    continue ICU care , transfer to floor    Desi Dennis MD  Intensivist  [facility]

## 2024-10-09 LAB
ANION GAP SERPL CALC-SCNC: 11 MMOL/L — SIGNIFICANT CHANGE UP (ref 7–14)
APTT BLD: 28 SEC — SIGNIFICANT CHANGE UP (ref 24.5–35.6)
BLD GP AB SCN SERPL QL: NEGATIVE — SIGNIFICANT CHANGE UP
BUN SERPL-MCNC: 23 MG/DL — SIGNIFICANT CHANGE UP (ref 7–23)
CALCIUM SERPL-MCNC: 9.4 MG/DL — SIGNIFICANT CHANGE UP (ref 8.4–10.5)
CHLORIDE SERPL-SCNC: 103 MMOL/L — SIGNIFICANT CHANGE UP (ref 98–107)
CO2 SERPL-SCNC: 24 MMOL/L — SIGNIFICANT CHANGE UP (ref 22–31)
CREAT SERPL-MCNC: 0.87 MG/DL — SIGNIFICANT CHANGE UP (ref 0.5–1.3)
EGFR: 91 ML/MIN/1.73M2 — SIGNIFICANT CHANGE UP
GLUCOSE SERPL-MCNC: 150 MG/DL — HIGH (ref 70–99)
HCT VFR BLD CALC: 36.6 % — LOW (ref 39–50)
HGB BLD-MCNC: 12.3 G/DL — LOW (ref 13–17)
INR BLD: 1.03 RATIO — SIGNIFICANT CHANGE UP (ref 0.85–1.16)
MAGNESIUM SERPL-MCNC: 1.8 MG/DL — SIGNIFICANT CHANGE UP (ref 1.6–2.6)
MCHC RBC-ENTMCNC: 29.9 PG — SIGNIFICANT CHANGE UP (ref 27–34)
MCHC RBC-ENTMCNC: 33.6 GM/DL — SIGNIFICANT CHANGE UP (ref 32–36)
MCV RBC AUTO: 88.8 FL — SIGNIFICANT CHANGE UP (ref 80–100)
MRSA PCR RESULT.: SIGNIFICANT CHANGE UP
NRBC # BLD: 0 /100 WBCS — SIGNIFICANT CHANGE UP (ref 0–0)
NRBC # FLD: 0 K/UL — SIGNIFICANT CHANGE UP (ref 0–0)
PHOSPHATE SERPL-MCNC: 3.3 MG/DL — SIGNIFICANT CHANGE UP (ref 2.5–4.5)
PLATELET # BLD AUTO: 126 K/UL — LOW (ref 150–400)
POTASSIUM SERPL-MCNC: 5.1 MMOL/L — SIGNIFICANT CHANGE UP (ref 3.5–5.3)
POTASSIUM SERPL-SCNC: 5.1 MMOL/L — SIGNIFICANT CHANGE UP (ref 3.5–5.3)
PROTHROM AB SERPL-ACNC: 12.3 SEC — SIGNIFICANT CHANGE UP (ref 9.9–13.4)
RBC # BLD: 4.12 M/UL — LOW (ref 4.2–5.8)
RBC # FLD: 14 % — SIGNIFICANT CHANGE UP (ref 10.3–14.5)
RH IG SCN BLD-IMP: POSITIVE — SIGNIFICANT CHANGE UP
S AUREUS DNA NOSE QL NAA+PROBE: SIGNIFICANT CHANGE UP
SODIUM SERPL-SCNC: 138 MMOL/L — SIGNIFICANT CHANGE UP (ref 135–145)
WBC # BLD: 8.95 K/UL — SIGNIFICANT CHANGE UP (ref 3.8–10.5)
WBC # FLD AUTO: 8.95 K/UL — SIGNIFICANT CHANGE UP (ref 3.8–10.5)

## 2024-10-09 PROCEDURE — 99233 SBSQ HOSP IP/OBS HIGH 50: CPT

## 2024-10-09 PROCEDURE — 71045 X-RAY EXAM CHEST 1 VIEW: CPT | Mod: 26

## 2024-10-09 RX ORDER — OXYCODONE HYDROCHLORIDE 30 MG/1
2.5 TABLET, FILM COATED, EXTENDED RELEASE ORAL EVERY 4 HOURS
Refills: 0 | Status: DISCONTINUED | OUTPATIENT
Start: 2024-10-09 | End: 2024-10-09

## 2024-10-09 RX ORDER — ROSUVASTATIN CALCIUM 20 MG/1
20 TABLET, COATED ORAL AT BEDTIME
Refills: 0 | Status: DISCONTINUED | OUTPATIENT
Start: 2024-10-09 | End: 2024-10-16

## 2024-10-09 RX ORDER — MAGNESIUM SULFATE 500 MG/ML
2 VIAL (ML) INJECTION ONCE
Refills: 0 | Status: COMPLETED | OUTPATIENT
Start: 2024-10-09 | End: 2024-10-09

## 2024-10-09 RX ORDER — ACETAMINOPHEN 325 MG
1000 TABLET ORAL ONCE
Refills: 0 | Status: DISCONTINUED | OUTPATIENT
Start: 2024-10-09 | End: 2024-10-16

## 2024-10-09 RX ORDER — METOPROLOL TARTRATE 50 MG
25 TABLET ORAL EVERY 12 HOURS
Refills: 0 | Status: DISCONTINUED | OUTPATIENT
Start: 2024-10-09 | End: 2024-10-16

## 2024-10-09 RX ORDER — LIDOCAINE 50 MG/G
1 CREAM TOPICAL
Refills: 0 | Status: DISCONTINUED | OUTPATIENT
Start: 2024-10-09 | End: 2024-10-16

## 2024-10-09 RX ORDER — OXYCODONE HYDROCHLORIDE 30 MG/1
5 TABLET, FILM COATED, EXTENDED RELEASE ORAL EVERY 4 HOURS
Refills: 0 | Status: DISCONTINUED | OUTPATIENT
Start: 2024-10-09 | End: 2024-10-15

## 2024-10-09 RX ORDER — ASPIRIN 325 MG
81 TABLET ORAL DAILY
Refills: 0 | Status: DISCONTINUED | OUTPATIENT
Start: 2024-10-09 | End: 2024-10-16

## 2024-10-09 RX ORDER — ACETAMINOPHEN 325 MG
650 TABLET ORAL EVERY 6 HOURS
Refills: 0 | Status: DISCONTINUED | OUTPATIENT
Start: 2024-10-09 | End: 2024-10-16

## 2024-10-09 RX ORDER — CHLORHEXIDINE GLUCONATE ORAL RINSE 1.2 MG/ML
1 SOLUTION DENTAL
Refills: 0 | Status: DISCONTINUED | OUTPATIENT
Start: 2024-10-09 | End: 2024-10-11

## 2024-10-09 RX ADMIN — Medication 81 MILLIGRAM(S): at 11:03

## 2024-10-09 RX ADMIN — LIDOCAINE 1 PATCH: 50 CREAM TOPICAL at 23:30

## 2024-10-09 RX ADMIN — Medication 25 MILLIGRAM(S): at 18:02

## 2024-10-09 RX ADMIN — Medication 5000 UNIT(S): at 14:40

## 2024-10-09 RX ADMIN — LIDOCAINE 1 PATCH: 50 CREAM TOPICAL at 11:06

## 2024-10-09 RX ADMIN — Medication 17 GRAM(S): at 22:26

## 2024-10-09 RX ADMIN — Medication 1000 MILLIGRAM(S): at 09:03

## 2024-10-09 RX ADMIN — Medication 400 MILLIGRAM(S): at 08:33

## 2024-10-09 RX ADMIN — CHLORHEXIDINE GLUCONATE ORAL RINSE 1 APPLICATION(S): 1.2 SOLUTION DENTAL at 05:15

## 2024-10-09 RX ADMIN — Medication 25 GRAM(S): at 05:00

## 2024-10-09 RX ADMIN — OXYCODONE HYDROCHLORIDE 5 MILLIGRAM(S): 30 TABLET, FILM COATED, EXTENDED RELEASE ORAL at 15:02

## 2024-10-09 RX ADMIN — Medication 25 MILLIGRAM(S): at 11:03

## 2024-10-09 RX ADMIN — ROSUVASTATIN CALCIUM 20 MILLIGRAM(S): 20 TABLET, COATED ORAL at 22:26

## 2024-10-09 RX ADMIN — HYDROMORPHONE HYDROCHLORIDE 30 MILLILITER(S): 1 INJECTION, SOLUTION INTRAMUSCULAR; INTRAVENOUS; SUBCUTANEOUS at 07:32

## 2024-10-09 RX ADMIN — OXYCODONE HYDROCHLORIDE 5 MILLIGRAM(S): 30 TABLET, FILM COATED, EXTENDED RELEASE ORAL at 15:36

## 2024-10-09 RX ADMIN — Medication 5000 UNIT(S): at 03:36

## 2024-10-09 RX ADMIN — LIDOCAINE 1 PATCH: 50 CREAM TOPICAL at 19:06

## 2024-10-09 RX ADMIN — Medication 2 TABLET(S): at 22:26

## 2024-10-09 NOTE — PROGRESS NOTE ADULT - SUBJECTIVE AND OBJECTIVE BOX
ANESTHESIA POSTOP CHECK    73y Male POSTOP DAY 1      Vital Signs Last 24 Hrs  T(C): 36.9 (09 Oct 2024 08:00), Max: 37 (08 Oct 2024 20:00)  T(F): 98.4 (09 Oct 2024 08:00), Max: 98.6 (08 Oct 2024 20:00)  HR: 94 (09 Oct 2024 08:00) (61 - 94)  BP: 158/82 (09 Oct 2024 08:00) (128/67 - 163/78)  BP(mean): 103 (09 Oct 2024 08:00) (80 - 108)  RR: 26 (09 Oct 2024 08:00) (10 - 26)  SpO2: 98% (09 Oct 2024 08:00) (93% - 100%)    Parameters below as of 09 Oct 2024 08:00  Patient On (Oxygen Delivery Method): room air            [x ] NO APPARENT ANESTHESIA COMPLICATIONS

## 2024-10-09 NOTE — PROGRESS NOTE ADULT - SUBJECTIVE AND OBJECTIVE BOX
Anesthesia Pain Management Service    SUBJECTIVE: Patient is doing well with IV PCA and no significant problems reported.    Pain Scale Score	At rest: ___ 	With Activity: ___ 	[X ] Refer to charted pain scores    THERAPY:    [ ] IV PCA Morphine		[ ] 5 mg/mL	[ ] 1 mg/mL  [X ] IV PCA Hydromorphone	[ ] 5 mg/mL	[X ] 1 mg/mL  [ ] IV PCA Fentanyl		[ ] 50 micrograms/mL    Demand dose __0.2_ lockout __6_ (minutes) Continuous Rate _0__ Total: _1.5__   mg used (in past 24 hrs)      MEDICATIONS  (STANDING):  acetaminophen   IVPB .. 1000 milliGRAM(s) IV Intermittent once  aspirin enteric coated 81 milliGRAM(s) Oral daily  chlorhexidine 2% Cloths 1 Application(s) Topical two times a day  heparin   Injectable 5000 Unit(s) SubCutaneous every 12 hours  lidocaine   4% Patch 1 Patch Transdermal <User Schedule>  metoprolol tartrate 25 milliGRAM(s) Oral every 12 hours  polyethylene glycol 3350 17 Gram(s) Oral at bedtime  rosuvastatin 20 milliGRAM(s) Oral at bedtime  senna 2 Tablet(s) Oral at bedtime    MEDICATIONS  (PRN):  acetaminophen     Tablet .. 650 milliGRAM(s) Oral every 6 hours PRN Temp greater or equal to 38C (100.4F), Mild Pain (1 - 3)  nalbuphine Injectable 2.5 milliGRAM(s) IV Push every 6 hours PRN Pruritus  naloxone Injectable 0.1 milliGRAM(s) IV Push every 3 minutes PRN For ANY of the following changes in patient status:  A. RR LESS THAN 10 breaths per minute, B. Oxygen saturation LESS THAN 90%, C. Sedation score of 6  ondansetron Injectable 4 milliGRAM(s) IV Push every 6 hours PRN Nausea  oxyCODONE    IR 2.5 milliGRAM(s) Oral every 4 hours PRN Moderate Pain (4 - 6)  oxyCODONE    IR 5 milliGRAM(s) Oral every 4 hours PRN Severe Pain (7 - 10)      OBJECTIVE: Patient sitting in chair, CTx1    Sedation Score:	[ X] Alert	[ ] Drowsy 	[ ] Arousable	[ ] Asleep	[ ] Unresponsive    Side Effects:	[X ] None	[ ] Nausea	[ ] Vomiting	[ ] Pruritus  		[ ] Other:    Vital Signs Last 24 Hrs  T(C): 36.9 (09 Oct 2024 08:00), Max: 37 (08 Oct 2024 20:00)  T(F): 98.4 (09 Oct 2024 08:00), Max: 98.6 (08 Oct 2024 20:00)  HR: 85 (09 Oct 2024 10:00) (61 - 94)  BP: 145/65 (09 Oct 2024 10:00) (128/67 - 163/78)  BP(mean): 88 (09 Oct 2024 10:00) (80 - 108)  RR: 24 (09 Oct 2024 10:00) (10 - 26)  SpO2: 97% (09 Oct 2024 10:00) (93% - 100%)    Parameters below as of 09 Oct 2024 08:00  Patient On (Oxygen Delivery Method): room air        ASSESSMENT/ PLAN    Therapy to  be:	[ ] Continue   [ X] Discontinued   [X ] Change to prn Analgesics    Documentation and Verification of current medications:   [X] Done	[ ] Not done, not elligible    Comments: Discussed with CTICU, PCA discontinued. PRN Oral/IV opioids and/or Adjuvant non-opioid medication to be ordered at this point.    Progress Note written now but Patient was seen earlier.

## 2024-10-09 NOTE — PROGRESS NOTE ADULT - SUBJECTIVE AND OBJECTIVE BOX
CHIEF COMPLAINT: FOLLOW UP IN ICU FOR POSTOPERATIVE CARE OF PATIENT WHO IS S/P LUNG RESECTION      PROCEDURES: Robo LVATS, LLL wedge x2, Completion LLLobectomy, MLND 08-Oct-2024      ISSUES:   Lung nodule  Post op pain  Chest tube in place  CAD s/p PCI to LAD and RCA (3/2024, reportedly non-compliant with aspirin and Brilinta)   HTN  HLD  Smoker  Hx of CVA  L hydrocele    INTERVAL EVENTS:   Chest tube with airleak.      HISTORY:   Patient reports moderate pain at chest wall incision sites which is worse with coughing and deep breathing without associated fever or dyspnea. Pain is improved with use of pain meds.     PHYSICAL EXAM:   Gen: Comfortable, No acute distress  Eyes: Sclera white, Conjunctiva normal, Eyelids normal, Pupils symmetrical   ENT: Mucous membranes moist,  ,  ,    Neck: Trachea midline,  ,  ,  ,  ,  ,    CV: Rate regular, Rhythm regular,  ,  ,    Resp: Breath sounds coarse, No accessory muscles use, L chest tube in place,  ,    Abd: Soft, Non-distended, Non-tender,   ,  ,  ,    Skin: Warm, No peripheral edema of lower extremities,  ,    : No sharp  Neuro: Moving all 4 extremities,    Psych: A&Ox3      ASSESSMENT AND PLAN:     NEURO:  Post-operative Pain - Stable. Pain control with oxycodone PO          Hx of CVA - stable.   - Resume aspirin PO              RESPIRATORY:  Stable on room air - Incentive spirometry. Chest PT and suctioning of secretions. Out of bed to chair and ambulate with assistance. Continuous pulse oximetry for support & to prevent decompensation.             CARDIOVASCULAR:  Hemodynamically stable - Not on pressors. Continue hemodynamic monitoring.  Telemetry (medical test) - Reviewed by me today independently. Normal sinus rhythm.    HTN - stable.   - Resume metoprolol PO    CAD - stable.   - Resume aspirin.  - Hold Brilinta for now until cleared by Thoracic Surgery -- reportedly non-compliant with medication              RENAL:  Stable - Monitor IOs and electrolytes. Keep K above 4.0 and Mg above 2.0.              GASTROINTESTINAL:  GI prophylaxis not indicated  Zofran and Reglan IV PRN for nausea  Regular consistency diet         HEMATOLOGIC:  No signs of active bleeding. Monitor Hgb in CBC in AM  DVT prophylaxis with heparin subQ               INFECTIOUS DISEASE:  All surgical sites appear clean. No signs of active infection. Will monitor for fever and leukocytosis.          ENDOCRINE:  Stable – Monitor glucose fingersticks for goal 120-180.          ONCOLOGY:  Lung nodule - Improved. S/P resection. Follow up final pathology.  - Chest tube – Pleurevac regulated suctioning. Monitor chest tube output.        Pertinent clinical, laboratory, radiographic, hemodynamic, echocardiographic, respiratory data, microbiologic data and chart were reviewed by myself and analyzed frequently throughout the course of the day and night by myself.    Plan discussed at length with the CTICU staff and Attending CT Surgeon -   Dr Mark Velez.      Patient's status was discussed with patient at bedside.       	  I have spent 50 minutes of time with this patient monitoring hemodynamic status, respiratory status, and coordinating care in the ICU.    ________________________________________________      _________________________  VITAL SIGNS:  Vital Signs Last 24 Hrs  T(C): 36.9 (09 Oct 2024 08:00), Max: 37 (08 Oct 2024 20:00)  T(F): 98.4 (09 Oct 2024 08:00), Max: 98.6 (08 Oct 2024 20:00)  HR: 85 (09 Oct 2024 10:00) (61 - 94)  BP: 145/65 (09 Oct 2024 10:00) (128/67 - 163/78)  BP(mean): 88 (09 Oct 2024 10:00) (80 - 108)  RR: 24 (09 Oct 2024 10:00) (10 - 26)  SpO2: 97% (09 Oct 2024 10:00) (93% - 100%)    Parameters below as of 09 Oct 2024 08:00  Patient On (Oxygen Delivery Method): room air      I/Os:   I&O's Detail    08 Oct 2024 07:01  -  09 Oct 2024 07:00  --------------------------------------------------------  IN:    IV PiggyBack: 150 mL    Lactated Ringers: 360 mL  Total IN: 510 mL    OUT:    Chest Tube (mL): 250 mL    Voided (mL): 550 mL  Total OUT: 800 mL    Total NET: -290 mL      09 Oct 2024 07:01  -  09 Oct 2024 10:47  --------------------------------------------------------  IN:    Lactated Ringers: 30 mL    Oral Fluid: 100 mL  Total IN: 130 mL    OUT:    Voided (mL): 250 mL  Total OUT: 250 mL    Total NET: -120 mL              MEDICATIONS:  MEDICATIONS  (STANDING):  acetaminophen   IVPB .. 1000 milliGRAM(s) IV Intermittent once  aspirin enteric coated 81 milliGRAM(s) Oral daily  chlorhexidine 2% Cloths 1 Application(s) Topical two times a day  heparin   Injectable 5000 Unit(s) SubCutaneous every 12 hours  lidocaine   4% Patch 1 Patch Transdermal <User Schedule>  metoprolol tartrate 25 milliGRAM(s) Oral every 12 hours  polyethylene glycol 3350 17 Gram(s) Oral at bedtime  rosuvastatin 20 milliGRAM(s) Oral at bedtime  senna 2 Tablet(s) Oral at bedtime    MEDICATIONS  (PRN):  acetaminophen     Tablet .. 650 milliGRAM(s) Oral every 6 hours PRN Temp greater or equal to 38C (100.4F), Mild Pain (1 - 3)  nalbuphine Injectable 2.5 milliGRAM(s) IV Push every 6 hours PRN Pruritus  naloxone Injectable 0.1 milliGRAM(s) IV Push every 3 minutes PRN For ANY of the following changes in patient status:  A. RR LESS THAN 10 breaths per minute, B. Oxygen saturation LESS THAN 90%, C. Sedation score of 6  ondansetron Injectable 4 milliGRAM(s) IV Push every 6 hours PRN Nausea  oxyCODONE    IR 2.5 milliGRAM(s) Oral every 4 hours PRN Moderate Pain (4 - 6)  oxyCODONE    IR 5 milliGRAM(s) Oral every 4 hours PRN Severe Pain (7 - 10)      LABS:  Laboratory data was independently reviewed by me today.                           12.3   8.95  )-----------( 126      ( 09 Oct 2024 03:40 )             36.6     10-09    138  |  103  |  23  ----------------------------<  150[H]  5.1   |  24  |  0.87    Ca    9.4      09 Oct 2024 03:40  Phos  3.3     10-09  Mg     1.80     10-09        PT/INR - ( 09 Oct 2024 04:40 )   PT: 12.3 sec;   INR: 1.03 ratio         PTT - ( 09 Oct 2024 04:40 )  PTT:28.0 sec    Urinalysis Basic - ( 09 Oct 2024 03:40 )    Color: x / Appearance: x / SG: x / pH: x  Gluc: 150 mg/dL / Ketone: x  / Bili: x / Urobili: x   Blood: x / Protein: x / Nitrite: x   Leuk Esterase: x / RBC: x / WBC x   Sq Epi: x / Non Sq Epi: x / Bacteria: x        RADIOLOGY:   Radiology images were independently reviewed by me today. Reports were reviewed by me today.    Xray Chest 1 View- PORTABLE-Routine:   ACC: [id number] EXAM:  XR CHEST PORTABLE ROUTINE 1V   ORDERED BY: KAMAR HYDE     ACC: [id number] EXAM:  XR CHEST PORTABLE URGENT 1V   ORDERED BY: KAMAR HYDE     PROCEDURE DATE:  10/08/2024          INTERPRETATION:  CLINICAL INFORMATION: Post thoracic surgery    TIME OF EXAMINATION: October 8, 2024 at 8:14 PM and October 9 at 5:11 AM.    EXAM: Portable chest    FINDINGS:    October 8:  Left-sided chest tube is present.  Lungs are clear.  Heart difficult to evaluate on this view.  No effusions or pneumothorax.    October 9:  Tiny left apical pneumothorax is now seen.        COMPARISON: No prior exams available.        IMPRESSION: Status post left thoracotomy with chest tube, clear lungs and   apical pneumothorax.    --- End of Report ---            GERTRUDIS CARLSON MD; Attending Radiologist  This document has been electronically signed. Oct  9 2024  7:21AM (10-09-24 @ 05:55)  Xray Chest 1 View- PORTABLE-Urgent:   ACC: [id number] EXAM:  XR CHEST PORTABLE ROUTINE 1V   ORDERED BY: KAMAR HYDE     ACC: [id number] EXAM:  XR CHEST PORTABLE URGENT 1V   ORDERED BY: KAMAR HYDE     PROCEDURE DATE:  10/08/2024          INTERPRETATION:  CLINICAL INFORMATION: Post thoracic surgery    TIME OF EXAMINATION: October 8, 2024 at 8:14 PM and October 9 at 5:11 AM.    EXAM: Portable chest    FINDINGS:    October 8:  Left-sided chest tube is present.  Lungs are clear.  Heart difficult to evaluate on this view.  No effusions or pneumothorax.    October 9:  Tiny left apical pneumothorax is now seen.        COMPARISON: No prior exams available.        IMPRESSION: Status post left thoracotomy with chest tube, clear lungs and   apical pneumothorax.    --- End of Report ---            GERTRUDIS CARLSON MD; Attending Radiologist  This document has been electronically signed. Oct  9 2024  7:21AM (10-08-24 @ 20:24)

## 2024-10-10 LAB
ANION GAP SERPL CALC-SCNC: 12 MMOL/L — SIGNIFICANT CHANGE UP (ref 7–14)
BUN SERPL-MCNC: 17 MG/DL — SIGNIFICANT CHANGE UP (ref 7–23)
CALCIUM SERPL-MCNC: 8.8 MG/DL — SIGNIFICANT CHANGE UP (ref 8.4–10.5)
CHLORIDE SERPL-SCNC: 104 MMOL/L — SIGNIFICANT CHANGE UP (ref 98–107)
CO2 SERPL-SCNC: 22 MMOL/L — SIGNIFICANT CHANGE UP (ref 22–31)
CREAT SERPL-MCNC: 0.83 MG/DL — SIGNIFICANT CHANGE UP (ref 0.5–1.3)
EGFR: 92 ML/MIN/1.73M2 — SIGNIFICANT CHANGE UP
GLUCOSE SERPL-MCNC: 108 MG/DL — HIGH (ref 70–99)
HCT VFR BLD CALC: 35.8 % — LOW (ref 39–50)
HGB BLD-MCNC: 12.1 G/DL — LOW (ref 13–17)
MAGNESIUM SERPL-MCNC: 2 MG/DL — SIGNIFICANT CHANGE UP (ref 1.6–2.6)
MCHC RBC-ENTMCNC: 29.4 PG — SIGNIFICANT CHANGE UP (ref 27–34)
MCHC RBC-ENTMCNC: 33.8 GM/DL — SIGNIFICANT CHANGE UP (ref 32–36)
MCV RBC AUTO: 87.1 FL — SIGNIFICANT CHANGE UP (ref 80–100)
NRBC # BLD: 0 /100 WBCS — SIGNIFICANT CHANGE UP (ref 0–0)
NRBC # FLD: 0 K/UL — SIGNIFICANT CHANGE UP (ref 0–0)
PHOSPHATE SERPL-MCNC: 1.5 MG/DL — LOW (ref 2.5–4.5)
PLATELET # BLD AUTO: 120 K/UL — LOW (ref 150–400)
POTASSIUM SERPL-MCNC: 3.9 MMOL/L — SIGNIFICANT CHANGE UP (ref 3.5–5.3)
POTASSIUM SERPL-SCNC: 3.9 MMOL/L — SIGNIFICANT CHANGE UP (ref 3.5–5.3)
RBC # BLD: 4.11 M/UL — LOW (ref 4.2–5.8)
RBC # FLD: 13.8 % — SIGNIFICANT CHANGE UP (ref 10.3–14.5)
SODIUM SERPL-SCNC: 138 MMOL/L — SIGNIFICANT CHANGE UP (ref 135–145)
WBC # BLD: 7.36 K/UL — SIGNIFICANT CHANGE UP (ref 3.8–10.5)
WBC # FLD AUTO: 7.36 K/UL — SIGNIFICANT CHANGE UP (ref 3.8–10.5)

## 2024-10-10 PROCEDURE — 71045 X-RAY EXAM CHEST 1 VIEW: CPT | Mod: 26,76

## 2024-10-10 PROCEDURE — 99233 SBSQ HOSP IP/OBS HIGH 50: CPT

## 2024-10-10 RX ADMIN — Medication 25 MILLIGRAM(S): at 05:25

## 2024-10-10 RX ADMIN — Medication 5000 UNIT(S): at 17:07

## 2024-10-10 RX ADMIN — OXYCODONE HYDROCHLORIDE 5 MILLIGRAM(S): 30 TABLET, FILM COATED, EXTENDED RELEASE ORAL at 04:40

## 2024-10-10 RX ADMIN — Medication 25 MILLIGRAM(S): at 17:06

## 2024-10-10 RX ADMIN — ROSUVASTATIN CALCIUM 20 MILLIGRAM(S): 20 TABLET, COATED ORAL at 21:07

## 2024-10-10 RX ADMIN — Medication 17 GRAM(S): at 21:08

## 2024-10-10 RX ADMIN — CHLORHEXIDINE GLUCONATE ORAL RINSE 1 APPLICATION(S): 1.2 SOLUTION DENTAL at 17:06

## 2024-10-10 RX ADMIN — OXYCODONE HYDROCHLORIDE 5 MILLIGRAM(S): 30 TABLET, FILM COATED, EXTENDED RELEASE ORAL at 04:08

## 2024-10-10 RX ADMIN — LIDOCAINE 1 PATCH: 50 CREAM TOPICAL at 11:29

## 2024-10-10 RX ADMIN — LIDOCAINE 1 PATCH: 50 CREAM TOPICAL at 19:00

## 2024-10-10 RX ADMIN — CHLORHEXIDINE GLUCONATE ORAL RINSE 1 APPLICATION(S): 1.2 SOLUTION DENTAL at 05:26

## 2024-10-10 RX ADMIN — Medication 5000 UNIT(S): at 05:25

## 2024-10-10 RX ADMIN — Medication 2 TABLET(S): at 21:08

## 2024-10-10 NOTE — CONSULT NOTE ADULT - NS ATTEND AMEND GEN_ALL_CORE FT
Symptomatic typical AFL. Start Amio to maintain SR now as unable to start AC. Will benefit from ablation once able to be on AC.

## 2024-10-10 NOTE — CONSULT NOTE ADULT - ASSESSMENT
73 year old male with past medical history of CAD s/p PCI to LAD and RCA in 3/2024 with residual Cx/OM disease, HTN, HLD, lung nodule, CT of chest from 8/30/24 showing left lower lobe ground glass nodule increasing in size from prior imaging. Patient is s/p Robotic assisted LVATS, LLL wedge x2, Completion LL Lobectomy, on 08-Oct-2024. EP was called for atrial flutter and sinus bradycardia. Review of telemetry shows sinus rhythm with HR 70s-80s and episodes of atrial flutter with VR up to 140s and few episodes of sinus bradycardia with HR as low as 39 bpm. Sinus bradycardia likely vagally mediated as she has p-p prolongation. She is on metoprolol for rate control. Not on AC. Currently in sinus rhythm with HR 70s-80s  - Continue to monitor on telemetry  - Maintain K>4 and MG>2  - Continue BB  - Check TFTs and LFTs  - Start Amiodarone 400mg BID x 1 week, 400mg daily x 1 week and then change to 200mg daily  - Follow up TFTs and LFTs Q 3 months, out patient PFTs and annual ophthalmology examination while on Amiodarone  - Start Eliquis 5mg BID when cleared by thoracic surgery, CHADs2 VAsc =2  - Echocardiogram to evaluate LV function and valve function  - Continue management as per primary team  - Will follow up

## 2024-10-10 NOTE — PROGRESS NOTE ADULT - SUBJECTIVE AND OBJECTIVE BOX
Subjective: patient seen and examined with thoracic surgery team  pt without acute complaints  pain controlled  pt denies chest pain or shortness of breath  using incentive spirometer  on bowel regimen, +flatus, +BM  ambulatory with assistance  d/w attending on morning TEAMS report      Vital Signs:  Vital Signs Last 24 Hrs  T(C): 37.1 (10-10-24 @ 04:20), Max: 37.5 (10-09-24 @ 20:06)  T(F): 98.7 (10-10-24 @ 04:20), Max: 99.5 (10-09-24 @ 20:06)  HR: 70 (10-10-24 @ 04:20) (70 - 94)  BP: 143/81 (10-10-24 @ 04:20) (132/70 - 171/88)  RR: 18 (10-10-24 @ 04:20) (13 - 26)  SpO2: 99% (10-10-24 @ 04:20) (97% - 99%) on (O2)      PE  Telemetry/Alarms: SR/SB  General: awake and alert NAD  Neurology: A&Ox3, nonfocal, ALFREDO x 4  Respiratory: CTA B/L  CV: RRR, S1S2, no murmurs, rubs or gallops  Abdominal: Soft, NT, ND +BS, Last BM  Extremities: No edema, + peripheral pulses  Incisions: c,d,i  Tubes: L chest tube on suction drained 280cc/24h + FEAL  Relevant labs, radiology and Medications reviewed                        12.1   7.36  )-----------( 120      ( 10 Oct 2024 05:16 )             35.8     10-09    138  |  103  |  23  ----------------------------<  150[H]  5.1   |  24  |  0.87    Ca    9.4      09 Oct 2024 03:40  Phos  3.3     10-09  Mg     1.80     10-09      PT/INR - ( 09 Oct 2024 04:40 )   PT: 12.3 sec;   INR: 1.03 ratio         PTT - ( 09 Oct 2024 04:40 )  PTT:28.0 sec  MEDICATIONS  (STANDING):  acetaminophen   IVPB .. 1000 milliGRAM(s) IV Intermittent once  aspirin enteric coated 81 milliGRAM(s) Oral daily  chlorhexidine 2% Cloths 1 Application(s) Topical two times a day  heparin   Injectable 5000 Unit(s) SubCutaneous every 12 hours  lidocaine   4% Patch 1 Patch Transdermal <User Schedule>  metoprolol tartrate 25 milliGRAM(s) Oral every 12 hours  polyethylene glycol 3350 17 Gram(s) Oral at bedtime  rosuvastatin 20 milliGRAM(s) Oral at bedtime  senna 2 Tablet(s) Oral at bedtime    MEDICATIONS  (PRN):  acetaminophen     Tablet .. 650 milliGRAM(s) Oral every 6 hours PRN Temp greater or equal to 38C (100.4F), Mild Pain (1 - 3)  nalbuphine Injectable 2.5 milliGRAM(s) IV Push every 6 hours PRN Pruritus  naloxone Injectable 0.1 milliGRAM(s) IV Push every 3 minutes PRN For ANY of the following changes in patient status:  A. RR LESS THAN 10 breaths per minute, B. Oxygen saturation LESS THAN 90%, C. Sedation score of 6  ondansetron Injectable 4 milliGRAM(s) IV Push every 6 hours PRN Nausea  oxyCODONE    IR 2.5 milliGRAM(s) Oral every 4 hours PRN Moderate Pain (4 - 6)  oxyCODONE    IR 5 milliGRAM(s) Oral every 4 hours PRN Severe Pain (7 - 10)    Pertinent Physical Exam  I&O's Summary    09 Oct 2024 07:01  -  10 Oct 2024 07:00  --------------------------------------------------------  IN: 955 mL / OUT: 2730 mL / NET: -1775 mL              PAST MEDICAL & SURGICAL HISTORY:  CAD (coronary artery disease)      Hypertension      Solitary pulmonary nodule      Current smoker      Stroke      Left hydrocele      H/O abdominal surgery      History of percutaneous coronary intervention      S/P cataract surgery      ASSESSMENT  73 year old male with past medical history of CAD s/p PCI to LAD and RCA in 3/2024 with residual Cx/OM disease, HTN, HLD, and lung nodule. CT of chest from 8/30/24 showing left lower lobe ground glass nodule increasing in size from prior imaging. Patient is now s/p  PROCEDURES: Robo LVATS, LLL wedge x2, Completion LLLobectomy, MLND 08-Oct-2024    PLAN  Neuro: Pain management  Pulm: Encourage coughing, deep breathing and use of incentive spirometry. Wean off supplemental oxygen as able. Daily CXR.   Cardio: Monitor telemetry/alarms  GI: Tolerating diet. Continue stool softeners.  Renal: monitor urine output, supplement electrolytes as needed  Vasc: Heparin SC/SCDs for DVT prophylaxis  Heme: Stable H/H. .   ID: Off antibiotics. Stable.  Therapy: OOB/ambulate  Tubes: Monitor Chest tube output and air leak status  Disposition: continue chest tube while output increased and AL present  Discussed with Cardiothoracic Team at AM rounds.

## 2024-10-10 NOTE — PROVIDER CONTACT NOTE (OTHER) - ASSESSMENT
pT a&oX4, bp 155/83, hhr 74, 96% in RA, temp 98.7. Denies chest pain or sob. Complaining of pain by CT site

## 2024-10-10 NOTE — PROVIDER CONTACT NOTE (OTHER) - SITUATION
Patient went phoebe on tele with a hr of 39 and five minutes later went tachy to 125 and was in atrial flutter

## 2024-10-10 NOTE — CONSULT NOTE ADULT - SUBJECTIVE AND OBJECTIVE BOX
CHIEF COMPLAINT:  Called to evaluate patient with paroxysmal atrial flutter with RVR and sinus bradycardia  HISTORY OF PRESENT ILLNESS:  73 year old male with past medical history of CAD s/p PCI to LAD and RCA in 3/2024 with residual Cx/OM disease, HTN, HLD, lung nodule, CT of chest from 8/30/24 showing left lower lobe ground glass nodule increasing in size from prior imaging. Patient is s/p Robotic assisted LVATS, LLL wedge x2, Completion LL Lobectomy, on 08-Oct-2024. He is still with left chest tube in place. EP was called for episodes of atrial flutter with RVR and sinus bradycardia with HR as low as 38 bpm. Review of telemetry shows sinus rhythm with HR 70s-80s and episodes of atrial flutter with VR up to 140s and few episodes of sinus bradycardia with HR as low as 39 bpm. He is on metoprolol 25 mg BID. Patient has compliant of palpitations and dizziness associated with atrial flutter. He denies any prior history of atrial arrhythmia. Currently patient is resting in bed and denies any chest pain, SOB, palpitations or dizziness. Telemetry shows sinus rhythm with HR 70s-80s.  Patient is Mandarin speaking and spoke to patient using Mandarin  ID # [id number]      PAST MEDICAL & SURGICAL HISTORY:  CAD (coronary artery disease)  Hypertension  Solitary pulmonary nodule  Current smoker  Stroke  Left hydrocele  H/O abdominal surgery  History of percutaneous coronary intervention  S/P cataract surgery    PREVIOUS DIAGNOSTIC TESTING:    Echocardiogram:  pending   Catheterization:  s/p PCI to LAD and RCA in 3/2024 with residual Cx/OM disease      MEDICATIONS:  aspirin enteric coated 81 milliGRAM(s) Oral daily  heparin   Injectable 5000 Unit(s) SubCutaneous every 12 hours  metoprolol tartrate 25 milliGRAM(s) Oral every 12 hours  acetaminophen     Tablet .. 650 milliGRAM(s) Oral every 6 hours PRN  acetaminophen   IVPB .. 1000 milliGRAM(s) IV Intermittent once  nalbuphine Injectable 2.5 milliGRAM(s) IV Push every 6 hours PRN  ondansetron Injectable 4 milliGRAM(s) IV Push every 6 hours PRN  oxyCODONE    IR 2.5 milliGRAM(s) Oral every 4 hours PRN  oxyCODONE    IR 5 milliGRAM(s) Oral every 4 hours PRN    polyethylene glycol 3350 17 Gram(s) Oral at bedtime  senna 2 Tablet(s) Oral at bedtime    rosuvastatin 20 milliGRAM(s) Oral at bedtime    chlorhexidine 2% Cloths 1 Application(s) Topical two times a day  lidocaine   4% Patch 1 Patch Transdermal <User Schedule>      FAMILY HISTORY:  No pertinent family history in first degree relatives        SOCIAL HISTORY:      [+] Smoker  [-] Alcohol  [-] Drugs    Allergies    No Known Allergies    Intolerances    	    REVIEW OF SYSTEMS:  CONSTITUTIONAL: No fever, weight loss, or fatigue  EYES: No eye pain, visual disturbances, or discharge  ENMT:  No difficulty hearing, tinnitus, vertigo; No sinus or throat pain  NECK: No pain or stiffness  RESPIRATORY: No cough, wheezing, chills or hemoptysis; No Shortness of Breath  CARDIOVASCULAR: No chest pain, passing out, or leg swelling, + palpitations and dizziness  GASTROINTESTINAL: No abdominal or epigastric pain. No nausea, vomiting, or hematemesis; No diarrhea or constipation. No melena or hematochezia.  GENITOURINARY: No dysuria, frequency, hematuria, or incontinence  NEUROLOGICAL: No headaches, memory loss, loss of strength, numbness, or tremors  SKIN: No itching, burning, rashes, or lesions   LYMPH Nodes: No enlarged glands  ENDOCRINE: No heat or cold intolerance; No hair loss  MUSCULOSKELETAL: No joint pain or swelling; No muscle, back, or extremity pain  PSYCHIATRIC: No depression, anxiety, mood swings, or difficulty sleeping  HEME/LYMPH: No easy bruising, or bleeding gums  ALLERY AND IMMUNOLOGIC: No hives or eczema	    [X] All others negative	  [ ] Unable to obtain    PHYSICAL EXAM:  T(C): 36.9 (10-10-24 @ 16:04), Max: 37.6 (10-10-24 @ 08:04)  HR: 85 (10-10-24 @ 16:04) (70 - 85)  BP: 146/74 (10-10-24 @ 16:04) (143/78 - 156/87)  RR: 18 (10-10-24 @ 16:04) (18 - 18)  SpO2: 97% (10-10-24 @ 16:04) (97% - 100%)  Wt(kg): --  I&O's Summary    09 Oct 2024 07:01  -  10 Oct 2024 07:00  --------------------------------------------------------  IN: 955 mL / OUT: 2730 mL / NET: -1775 mL    10 Oct 2024 07:01  -  10 Oct 2024 16:35  --------------------------------------------------------  IN: 540 mL / OUT: 325 mL / NET: 215 mL        Appearance: Normal	  Cardiovascular: Normal S1 S2, No JVD, No murmurs, No edema  Respiratory:  left chest tube  Psychiatry: A & O x 3, Mood & affect appropriate  Gastrointestinal:  Soft, Non-tender, + BS	  Neurologic: Non-focal  Extremities: Normal range of motion, No clubbing, cyanosis or edema      TELEMETRY: Sinus rhythm with HR 70-80s, sinus bradycardia 30s-40s and atrial flutter with VR 130s-140s 	    ECG:  Sinus rhythm with HR 92 bpm; JOCELINE 132 ms; qrs 87 ms	  RADIOLOGY:  OTHER: 	  	  LABS:	 	    CARDIAC MARKERS:                        12.1   7.36  )-----------( 120      ( 10 Oct 2024 05:16 )             35.8     10-10    138  |  104  |  17  ----------------------------<  108[H]  3.9   |  22  |  0.83    Ca    8.8      10 Oct 2024 05:16  Phos  1.5     10-10  Mg     2.00     10-10        TSH: pending

## 2024-10-11 ENCOUNTER — RESULT REVIEW (OUTPATIENT)
Age: 74
End: 2024-10-11

## 2024-10-11 ENCOUNTER — TRANSCRIPTION ENCOUNTER (OUTPATIENT)
Age: 74
End: 2024-10-11

## 2024-10-11 LAB
ALBUMIN SERPL ELPH-MCNC: 3.4 G/DL — SIGNIFICANT CHANGE UP (ref 3.3–5)
ALP SERPL-CCNC: 60 U/L — SIGNIFICANT CHANGE UP (ref 40–120)
ALT FLD-CCNC: 24 U/L — SIGNIFICANT CHANGE UP (ref 4–41)
AST SERPL-CCNC: 33 U/L — SIGNIFICANT CHANGE UP (ref 4–40)
BILIRUB DIRECT SERPL-MCNC: 0.2 MG/DL — SIGNIFICANT CHANGE UP (ref 0–0.3)
BILIRUB INDIRECT FLD-MCNC: 0.6 MG/DL — SIGNIFICANT CHANGE UP (ref 0–1)
BILIRUB SERPL-MCNC: 0.8 MG/DL — SIGNIFICANT CHANGE UP (ref 0.2–1.2)
HCT VFR BLD CALC: 36.4 % — LOW (ref 39–50)
HGB BLD-MCNC: 12.7 G/DL — LOW (ref 13–17)
MAGNESIUM SERPL-MCNC: 1.9 MG/DL — SIGNIFICANT CHANGE UP (ref 1.6–2.6)
MCHC RBC-ENTMCNC: 30.5 PG — SIGNIFICANT CHANGE UP (ref 27–34)
MCHC RBC-ENTMCNC: 34.9 GM/DL — SIGNIFICANT CHANGE UP (ref 32–36)
MCV RBC AUTO: 87.3 FL — SIGNIFICANT CHANGE UP (ref 80–100)
NRBC # BLD: 0 /100 WBCS — SIGNIFICANT CHANGE UP (ref 0–0)
NRBC # FLD: 0 K/UL — SIGNIFICANT CHANGE UP (ref 0–0)
PLATELET # BLD AUTO: 130 K/UL — LOW (ref 150–400)
PROT SERPL-MCNC: 6 G/DL — SIGNIFICANT CHANGE UP (ref 6–8.3)
RBC # BLD: 4.17 M/UL — LOW (ref 4.2–5.8)
RBC # FLD: 13.4 % — SIGNIFICANT CHANGE UP (ref 10.3–14.5)
T3 SERPL-MCNC: 91 NG/DL — SIGNIFICANT CHANGE UP (ref 80–200)
T4 AB SER-ACNC: 8.48 UG/DL — SIGNIFICANT CHANGE UP (ref 5.1–13)
TSH SERPL-MCNC: 0.34 UIU/ML — SIGNIFICANT CHANGE UP (ref 0.27–4.2)
WBC # BLD: 7.4 K/UL — SIGNIFICANT CHANGE UP (ref 3.8–10.5)
WBC # FLD AUTO: 7.4 K/UL — SIGNIFICANT CHANGE UP (ref 3.8–10.5)

## 2024-10-11 PROCEDURE — 99232 SBSQ HOSP IP/OBS MODERATE 35: CPT

## 2024-10-11 PROCEDURE — 93306 TTE W/DOPPLER COMPLETE: CPT | Mod: 26

## 2024-10-11 PROCEDURE — 71045 X-RAY EXAM CHEST 1 VIEW: CPT | Mod: 26

## 2024-10-11 RX ORDER — AMIODARONE HYDROCHLORIDE 50 MG/ML
200 INJECTION, SOLUTION INTRAVENOUS DAILY
Refills: 0 | Status: CANCELLED | OUTPATIENT
Start: 2024-10-25 | End: 2024-10-16

## 2024-10-11 RX ORDER — EZETIMIBE 10 MG/1
10 TABLET ORAL DAILY
Refills: 0 | Status: DISCONTINUED | OUTPATIENT
Start: 2024-10-11 | End: 2024-10-16

## 2024-10-11 RX ORDER — AMIODARONE HYDROCHLORIDE 50 MG/ML
400 INJECTION, SOLUTION INTRAVENOUS
Refills: 0 | Status: DISCONTINUED | OUTPATIENT
Start: 2024-10-11 | End: 2024-10-16

## 2024-10-11 RX ORDER — AMIODARONE HYDROCHLORIDE 50 MG/ML
INJECTION, SOLUTION INTRAVENOUS
Refills: 0 | Status: DISCONTINUED | OUTPATIENT
Start: 2024-10-11 | End: 2024-10-16

## 2024-10-11 RX ORDER — AMIODARONE HYDROCHLORIDE 50 MG/ML
400 INJECTION, SOLUTION INTRAVENOUS DAILY
Refills: 0 | Status: CANCELLED | OUTPATIENT
Start: 2024-10-18 | End: 2024-10-16

## 2024-10-11 RX ADMIN — AMIODARONE HYDROCHLORIDE 400 MILLIGRAM(S): 50 INJECTION, SOLUTION INTRAVENOUS at 07:17

## 2024-10-11 RX ADMIN — ROSUVASTATIN CALCIUM 20 MILLIGRAM(S): 20 TABLET, COATED ORAL at 21:08

## 2024-10-11 RX ADMIN — Medication 2 TABLET(S): at 21:08

## 2024-10-11 RX ADMIN — EZETIMIBE 10 MILLIGRAM(S): 10 TABLET ORAL at 12:33

## 2024-10-11 RX ADMIN — Medication 17 GRAM(S): at 21:09

## 2024-10-11 RX ADMIN — Medication 5000 UNIT(S): at 05:06

## 2024-10-11 RX ADMIN — Medication 81 MILLIGRAM(S): at 12:33

## 2024-10-11 RX ADMIN — Medication 25 MILLIGRAM(S): at 05:04

## 2024-10-11 RX ADMIN — Medication 25 MILLIGRAM(S): at 19:01

## 2024-10-11 RX ADMIN — Medication 5000 UNIT(S): at 19:02

## 2024-10-11 RX ADMIN — LIDOCAINE 1 PATCH: 50 CREAM TOPICAL at 12:32

## 2024-10-11 RX ADMIN — LIDOCAINE 1 PATCH: 50 CREAM TOPICAL at 00:00

## 2024-10-11 RX ADMIN — AMIODARONE HYDROCHLORIDE 400 MILLIGRAM(S): 50 INJECTION, SOLUTION INTRAVENOUS at 19:01

## 2024-10-11 NOTE — DISCHARGE NOTE PROVIDER - CARE PROVIDER_API CALL
Mark Velez  Thoracic and Cardiac Surgery  [id number] [place] [address], [address] [place] [address]  Phone: [phone]  Fax: [phone]  Follow Up Time:

## 2024-10-11 NOTE — DISCHARGE NOTE PROVIDER - NSDCFUADDAPPT_GEN_ALL_CORE_FT
Follow up with Dr. Velez in 1-2 weeks [phone]   Chest x-ray prior to appointment with Dr. Velez  Follow up with primary care provider in one week  Follow up with Dr. Velez in 1-2 weeks [phone]   Chest x-ray prior to appointment with Dr. Velez  Follow up with primary care provider in one week     Please follow with Electrophysiology Dr Dave North  on November 13 at 9am  [phone] [place] [address], [address] [place] [address] [phone]  Follow up with Dr. Velez in 1-2 weeks [phone]   Chest x-ray prior to appointment with Dr. Velez  Follow up with primary care provider in one week     Please follow with Electrophysiology Dr Dave North  on November 13 at 9am  [phone] [place] [address], [address] of [place] [address] [phone]     See Opthalmology while on amiodarone  call for appointment    f/u thyroid function and PFTs while on amiodarone  follow with Primary Care Provider  call for appointment

## 2024-10-11 NOTE — DISCHARGE NOTE PROVIDER - NSDCMRMEDTOKEN_GEN_ALL_CORE_FT
amLODIPine 10 mg oral tablet: 1 tab(s) orally once a day  aspirin 81 mg oral tablet: 1 tab(s) orally once a day  Brilinta (ticagrelor) 90 mg oral tablet: 1 tab(s) orally 2 times a day  ezetimibe 10 mg oral tablet: 1 tab(s) orally once a day  hydroCHLOROthiazide 25 mg oral tablet: 1 tab(s) orally once a day  losartan 100 mg oral tablet: 1 tab(s) orally once a day  metoprolol succinate 50 mg oral capsule, extended release: 1 cap(s) orally once a day  Multiple Vitamins oral tablet: 1 tab(s) orally once a day last dose 9/30/24  rosuvastatin 20 mg oral capsule: 1 cap(s) orally once a day   acetaminophen 325 mg oral tablet: 2 tab(s) orally every 6 hours As needed Temp greater or equal to 38C (100.4F), Mild Pain (1 - 3)  amiodarone 200 mg oral tablet: 1 tab(s) orally once a day  amiodarone 400 mg oral tablet: 1 tab(s) orally 2 times a day  amiodarone 400 mg oral tablet: 1 tab(s) orally once a day  aspirin 81 mg oral tablet: 1 tab(s) orally once a day  CXR PA and lateral: R91.8  Eliquis 5 mg oral tablet: 1 tab(s) orally 2 times a day  ezetimibe 10 mg oral tablet: 1 tab(s) orally once a day  metoprolol tartrate 25 mg oral tablet: 1 tab(s) orally every 12 hours  Multiple Vitamins oral tablet: 1 tab(s) orally once a day last dose 9/30/24  oxyCODONE 5 mg oral tablet: 1 tab(s) orally every 4 hours as needed for Severe Pain (7 - 10) MDD: 6  polyethylene glycol 3350 oral powder for reconstitution: 17 gram(s) orally once a day (at bedtime)  rosuvastatin 20 mg oral capsule: 1 cap(s) orally once a day  senna leaf extract oral tablet: 2 tab(s) orally once a day (at bedtime)   acetaminophen 325 mg oral tablet: 2 tab(s) orally every 6 hours As needed Temp greater or equal to 38C (100.4F), Mild Pain (1 - 3)  amiodarone 200 mg oral tablet: 1 tab(s) orally once a day  amiodarone 400 mg oral tablet: 1 tab(s) orally 2 times a day  amiodarone 400 mg oral tablet: 1 tab(s) orally once a day  Eliquis 5 mg oral tablet: 1 tab(s) orally 2 times a day  ezetimibe 10 mg oral tablet: 1 tab(s) orally once a day  metoprolol tartrate 25 mg oral tablet: 1 tab(s) orally every 12 hours  Multiple Vitamins oral tablet: 1 tab(s) orally once a day last dose 9/30/24  oxyCODONE 5 mg oral tablet: 1 tab(s) orally every 4 hours as needed for Severe Pain (7 - 10) MDD: 6  polyethylene glycol 3350 oral powder for reconstitution: 17 gram(s) orally once a day (at bedtime)  rosuvastatin 20 mg oral capsule: 1 cap(s) orally once a day  senna leaf extract oral tablet: 2 tab(s) orally once a day (at bedtime)  ticagrelor 90 mg oral tablet: 1 tab(s) orally 2 times a day

## 2024-10-11 NOTE — PROGRESS NOTE ADULT - SUBJECTIVE AND OBJECTIVE BOX
Subjective: Translation done with mandarin speaking resident at bedside. Pt c/o "not feeling well" yesterday when his HR was fast. Feeling better now. Did not walk much yesterday due to RITIKA on tele. On RA now. no CP or SOB. OOB w assist. Currently SR on Tele    Vital Signs:  Vital Signs Last 24 Hrs  T(C): 37 (10-11-24 @ 12:00), Max: 37 (10-11-24 @ 12:00)  T(F): 98.6 (10-11-24 @ 12:00), Max: 98.6 (10-11-24 @ 12:00)  HR: 96 (10-11-24 @ 12:00) (70 - 96)  BP: 138/82 (10-11-24 @ 12:00) (138/82 - 151/76)  RR: 16 (10-11-24 @ 12:00) (16 - 18)  SpO2: 99% (10-11-24 @ 12:00) (96% - 99%) on (O2)    Telemetry/Alarms: tele SR 60-70  Relevant labs, radiology and Medications reviewed           CXR stable.              12.7   7.40  )-----------( 130      ( 11 Oct 2024 05:18 )             36.4     10-10    138  |  104  |  17  ----------------------------<  108[H]  3.9   |  22  |  0.83    Ca    8.8      10 Oct 2024 05:16  Phos  1.5     10-10  Mg     1.90     10-11    TPro  6.0  /  Alb  3.4  /  TBili  0.8  /  DBili  0.2  /  AST  33  /  ALT  24  /  AlkPhos  60  10-11      MEDICATIONS  (STANDING):  acetaminophen   IVPB .. 1000 milliGRAM(s) IV Intermittent once  aMIOdarone    Tablet 400 milliGRAM(s) Oral two times a day  aMIOdarone    Tablet   Oral   aspirin enteric coated 81 milliGRAM(s) Oral daily  ezetimibe 10 milliGRAM(s) Oral daily  heparin   Injectable 5000 Unit(s) SubCutaneous every 12 hours  lidocaine   4% Patch 1 Patch Transdermal <User Schedule>  metoprolol tartrate 25 milliGRAM(s) Oral every 12 hours  polyethylene glycol 3350 17 Gram(s) Oral at bedtime  rosuvastatin 20 milliGRAM(s) Oral at bedtime  senna 2 Tablet(s) Oral at bedtime    MEDICATIONS  (PRN):  acetaminophen     Tablet .. 650 milliGRAM(s) Oral every 6 hours PRN Temp greater or equal to 38C (100.4F), Mild Pain (1 - 3)  nalbuphine Injectable 2.5 milliGRAM(s) IV Push every 6 hours PRN Pruritus  naloxone Injectable 0.1 milliGRAM(s) IV Push every 3 minutes PRN For ANY of the following changes in patient status:  A. RR LESS THAN 10 breaths per minute, B. Oxygen saturation LESS THAN 90%, C. Sedation score of 6  ondansetron Injectable 4 milliGRAM(s) IV Push every 6 hours PRN Nausea  oxyCODONE    IR 2.5 milliGRAM(s) Oral every 4 hours PRN Moderate Pain (4 - 6)  oxyCODONE    IR 5 milliGRAM(s) Oral every 4 hours PRN Severe Pain (7 - 10)    General: WD NAD  Neurology: A&Ox3, nonfocal, ALFREDO x 4  Eyes: PERRLA/ EOMI, Gross vision intact  ENT/Neck: Neck supple, trachea midline, No JVD, Gross hearing intact  Respiratory: CTA B/L, No wheezing, rales, rhonchi. Dec'd BS to left   CV: RRR, S1S2, no murmurs, rubs or gallops  Abdominal: Soft, NT, ND +BS, no BM  Extremities: No edema, + peripheral pulses  Skin: No Rashes, Hematoma, Ecchymosis  Incisions: left VATS c/d/i.   Tubes: left CT about 210cc/24hrs on WS, l FEAL.   I&O's Summary    10 Oct 2024 07:01  -  11 Oct 2024 07:00  --------------------------------------------------------  IN: 840 mL / OUT: 1760 mL / NET: -920 mL    11 Oct 2024 07:01  -  11 Oct 2024 16:22  --------------------------------------------------------  IN: 0 mL / OUT: 460 mL / NET: -460 mL          Assessment  73y Male  w/ PAST MEDICAL & SURGICAL HISTORY:  CAD (coronary artery disease)      Hypertension      Solitary pulmonary nodule      Current smoker      Stroke      Left hydrocele      H/O abdominal surgery      History of percutaneous coronary intervention      S/P cataract surgery  ASSESSMENT  73 year old male with past medical history of CAD s/p PCI to LAD and RCA in 3/2024 with residual Cx/OM disease, HTN, HLD, and lung nodule. CT of chest from 8/30/24 showing left lower lobe ground glass nodule increasing in size from prior imaging. Patient is now s/p  PROCEDURES: Robo LVATS, LLL wedge x2, Completion LLLobectomy, MLND 08-Oct-2024  Post op CT maintained for air leak and high output. On 10/10-Pt developed rapid aflutter and bradycardia. Cardiology and EPS consulted. STarted on AMio load    PLAN  Neuro: Pain management  Pulm: Encourage coughing, deep breathing and use of incentive spirometry. . Daily CXR.   Cardio: Monitor telemetry/alarms. Continue Amio. Echo today. Labs WNL. Per DR. Velez, will hold off on a/c for now because of CT output. Will start Eliquis once CT removed.   GI: Tolerating diet. Continue stool softeners.  Renal: monitor urine output, supplement electrolytes as needed  Vasc: Heparin SC/SCDs for DVT prophylaxis  Heme: Stable H/H. .   ID: Off antibiotics. Stable.  Therapy: OOB/ambulate  Tubes: Monitor Chest tube output and air leak. Cont Waterseal.   Disposition: Aim to D/C to home once CT removed and cleared by Cardiology  Discussed with Cardiothoracic Team at AM rounds.

## 2024-10-11 NOTE — PROGRESS NOTE ADULT - SUBJECTIVE AND OBJECTIVE BOX
Patient is seen and examined. He denies any chest pain, SOB, palpitations or dizziness. He feels better this morning  Telemetry shows sinus rhythm with HR 60s-80s and intermittent atrial flutter and sinus bradycardia overnight with HR as low as 39 bpm    PAST MEDICAL & SURGICAL HISTORY:  CAD (coronary artery disease)  Hypertension  Solitary pulmonary nodule  Current smoker  Stroke  Left hydrocele  Contraindication to percutaneous coronary intervention (PCI) for medical reason  H/O abdominal surgery  History of percutaneous coronary intervention  S/P cataract surgery        MEDICATIONS  (STANDING):  acetaminophen   IVPB .. 1000 milliGRAM(s) IV Intermittent once  aMIOdarone    Tablet 400 milliGRAM(s) Oral two times a day  aMIOdarone    Tablet   Oral   aspirin enteric coated 81 milliGRAM(s) Oral daily  ezetimibe 10 milliGRAM(s) Oral daily  heparin   Injectable 5000 Unit(s) SubCutaneous every 12 hours  lidocaine   4% Patch 1 Patch Transdermal <User Schedule>  metoprolol tartrate 25 milliGRAM(s) Oral every 12 hours  polyethylene glycol 3350 17 Gram(s) Oral at bedtime  rosuvastatin 20 milliGRAM(s) Oral at bedtime  senna 2 Tablet(s) Oral at bedtime    MEDICATIONS  (PRN):  acetaminophen     Tablet .. 650 milliGRAM(s) Oral every 6 hours PRN Temp greater or equal to 38C (100.4F), Mild Pain (1 - 3)  nalbuphine Injectable 2.5 milliGRAM(s) IV Push every 6 hours PRN Pruritus  naloxone Injectable 0.1 milliGRAM(s) IV Push every 3 minutes PRN For ANY of the following changes in patient status:  A. RR LESS THAN 10 breaths per minute, B. Oxygen saturation LESS THAN 90%, C. Sedation score of 6  ondansetron Injectable 4 milliGRAM(s) IV Push every 6 hours PRN Nausea  oxyCODONE    IR 2.5 milliGRAM(s) Oral every 4 hours PRN Moderate Pain (4 - 6)  oxyCODONE    IR 5 milliGRAM(s) Oral every 4 hours PRN Severe Pain (7 - 10)      Vital Signs Last 24 Hrs  T(C): 36.3 (11 Oct 2024 04:55), Max: 37.1 (10 Oct 2024 12:05)  T(F): 97.3 (11 Oct 2024 04:55), Max: 98.8 (10 Oct 2024 12:05)  HR: 75 (11 Oct 2024 04:55) (74 - 85)  BP: 148/80 (11 Oct 2024 04:55) (143/78 - 151/76)  BP(mean): --  RR: 18 (11 Oct 2024 04:55) (18 - 18)  SpO2: 99% (11 Oct 2024 04:55) (96% - 100%)    Parameters below as of 11 Oct 2024 04:55  Patient On (Oxygen Delivery Method): room air  LABS:                        12.7   7.40  )-----------( 130      ( 11 Oct 2024 05:18 )             36.4     10-10    138  |  104  |  17  ----------------------------<  108[H]  3.9   |  22  |  0.83    Ca    8.8      10 Oct 2024 05:16  Phos  1.5     10-10  Mg     1.90     10-11    TPro  6.0  /  Alb  3.4  /  TBili  0.8  /  DBili  0.2  /  AST  33  /  ALT  24  /  AlkPhos  60  10-11    Urinalysis Basic - ( 10 Oct 2024 05:16 )    Color: x / Appearance: x / SG: x / pH: x  Gluc: 108 mg/dL / Ketone: x  / Bili: x / Urobili: x   Blood: x / Protein: x / Nitrite: x   Leuk Esterase: x / RBC: x / WBC x   Sq Epi: x / Non Sq Epi: x / Bacteria: x      I&O's Summary    10 Oct 2024 07:01  -  11 Oct 2024 07:00  --------------------------------------------------------  IN: 840 mL / OUT: 1760 mL / NET: -920 mL      PHYSICAL EXAM:    GENERAL: In no apparent distress, well nourished, and hydrated.  HEART: Regular rate and rhythm; No murmurs, rubs, or gallops.  PULMONARY: Clear to auscultation and percussion.  No rales, wheezing, or rhonchi bilaterally.  ABDOMEN: Soft, Nontender, Nondistended; Bowel sounds present  EXTREMITIES:  2+ Peripheral Pulses, No clubbing, cyanosis, or edema           Patient is seen and examined. He denies any chest pain, SOB, palpitations or dizziness. He feels better this morning  Telemetry shows sinus rhythm with HR 60s-80s and intermittent atrial flutter and sinus bradycardia overnight with HR as low as 39 bpm  Spoke to patient using Mandarin  ID #[id number]    PAST MEDICAL & SURGICAL HISTORY:  CAD (coronary artery disease)  Hypertension  Solitary pulmonary nodule  Current smoker  Stroke  Left hydrocele  Contraindication to percutaneous coronary intervention (PCI) for medical reason  H/O abdominal surgery  History of percutaneous coronary intervention  S/P cataract surgery        MEDICATIONS  (STANDING):  acetaminophen   IVPB .. 1000 milliGRAM(s) IV Intermittent once  aMIOdarone    Tablet 400 milliGRAM(s) Oral two times a day  aMIOdarone    Tablet   Oral   aspirin enteric coated 81 milliGRAM(s) Oral daily  ezetimibe 10 milliGRAM(s) Oral daily  heparin   Injectable 5000 Unit(s) SubCutaneous every 12 hours  lidocaine   4% Patch 1 Patch Transdermal <User Schedule>  metoprolol tartrate 25 milliGRAM(s) Oral every 12 hours  polyethylene glycol 3350 17 Gram(s) Oral at bedtime  rosuvastatin 20 milliGRAM(s) Oral at bedtime  senna 2 Tablet(s) Oral at bedtime    MEDICATIONS  (PRN):  acetaminophen     Tablet .. 650 milliGRAM(s) Oral every 6 hours PRN Temp greater or equal to 38C (100.4F), Mild Pain (1 - 3)  nalbuphine Injectable 2.5 milliGRAM(s) IV Push every 6 hours PRN Pruritus  naloxone Injectable 0.1 milliGRAM(s) IV Push every 3 minutes PRN For ANY of the following changes in patient status:  A. RR LESS THAN 10 breaths per minute, B. Oxygen saturation LESS THAN 90%, C. Sedation score of 6  ondansetron Injectable 4 milliGRAM(s) IV Push every 6 hours PRN Nausea  oxyCODONE    IR 2.5 milliGRAM(s) Oral every 4 hours PRN Moderate Pain (4 - 6)  oxyCODONE    IR 5 milliGRAM(s) Oral every 4 hours PRN Severe Pain (7 - 10)      Vital Signs Last 24 Hrs  T(C): 36.3 (11 Oct 2024 04:55), Max: 37.1 (10 Oct 2024 12:05)  T(F): 97.3 (11 Oct 2024 04:55), Max: 98.8 (10 Oct 2024 12:05)  HR: 75 (11 Oct 2024 04:55) (74 - 85)  BP: 148/80 (11 Oct 2024 04:55) (143/78 - 151/76)  BP(mean): --  RR: 18 (11 Oct 2024 04:55) (18 - 18)  SpO2: 99% (11 Oct 2024 04:55) (96% - 100%)    Parameters below as of 11 Oct 2024 04:55  Patient On (Oxygen Delivery Method): room air  LABS:                        12.7   7.40  )-----------( 130      ( 11 Oct 2024 05:18 )             36.4     10-10    138  |  104  |  17  ----------------------------<  108[H]  3.9   |  22  |  0.83    Ca    8.8      10 Oct 2024 05:16  Phos  1.5     10-10  Mg     1.90     10-11    TPro  6.0  /  Alb  3.4  /  TBili  0.8  /  DBili  0.2  /  AST  33  /  ALT  24  /  AlkPhos  60  10-11    Urinalysis Basic - ( 10 Oct 2024 05:16 )    Color: x / Appearance: x / SG: x / pH: x  Gluc: 108 mg/dL / Ketone: x  / Bili: x / Urobili: x   Blood: x / Protein: x / Nitrite: x   Leuk Esterase: x / RBC: x / WBC x   Sq Epi: x / Non Sq Epi: x / Bacteria: x      I&O's Summary    10 Oct 2024 07:01  -  11 Oct 2024 07:00  --------------------------------------------------------  IN: 840 mL / OUT: 1760 mL / NET: -920 mL      PHYSICAL EXAM:    GENERAL: In no apparent distress, well nourished, and hydrated.  HEART: Regular rate and rhythm; No murmurs, rubs, or gallops.  PULMONARY: Clear to auscultation and percussion.  No rales, wheezing, or rhonchi bilaterally.  ABDOMEN: Soft, Nontender, Nondistended; Bowel sounds present  EXTREMITIES:  2+ Peripheral Pulses, No clubbing, cyanosis, or edema

## 2024-10-11 NOTE — DISCHARGE NOTE PROVIDER - HOSPITAL COURSE
73 year old male with past medical history of CAD s/p PCI to LAD and RCA in 3/2024 with residual Cx/OM disease, HTN, HLD, and lung nodule. CT of chest from 8/30/24 showing left lower lobe ground glass nodule increasing in size from prior imaging. Patient is now s/p Robo LVATS, LLL wedge x2, Completion LLLobectomy, MLND 08-Oct-2024  Post op CT maintained for air leak and high output. On 10/10-Pt developed rapid aflutter and bradycardia. Cardiology and EPS consulted. Started on Amio load, rec Eliquis on discharge.   73 year old male with past medical history of CAD s/p PCI to LAD and RCA in 3/2024 with residual Cx/OM disease, HTN, HLD, and lung nodule. CT of chest from 8/30/24 showing left lower lobe ground glass nodule increasing in size from prior imaging. Patient is now s/p Robo LVATS, LLL wedge x2, Completion LLLobectomy, MLND 08-Oct-2024  Post op CT maintained for air leak and high output. On 10/10-Pt developed rapid aflutter and bradycardia. Cardiology and EPS consulted. Started on Amio load, rec Eliquis on discharge. Pt s/p bedside doxycycline pleurodesis 10/14. Air leak resolved and chest tube was removed 10/16. D/W EP team Issa Schmidt NP and Dr Alvarez. Plan for pt to be discharged home on eliquis and ASA to continue amiodarone load as outpatient. F/u with Dr North 11/13.  Pt seen and examined by Dr Edge on day of discharge. Pt to follow up with Dr Velez in 2 weeks with CXR.     Vital Signs Last 24 Hrs  T(C): 37.2 (16 Oct 2024 12:43), Max: 37.2 (16 Oct 2024 12:43)  T(F): 99 (16 Oct 2024 12:43), Max: 99 (16 Oct 2024 12:43)  HR: 69 (16 Oct 2024 12:43) (57 - 75)  BP: 134/70 (16 Oct 2024 12:43) (104/58 - 134/70)  BP(mean): --  RR: 18 (16 Oct 2024 12:43) (16 - 18)  SpO2: 99% (16 Oct 2024 12:43) (99% - 100%)    Parameters below as of 16 Oct 2024 12:43  Patient On (Oxygen Delivery Method): room air    PHYSICAL EXAM:  Gen: NAD  Resp: Respirations unlabored. Bilateral chest rise.   Card: RRR.   GI: Soft. Nontender. Nondistended.   Skin: Warm, well perfused, no masses or organomegaly  EXT: No clubbing, cyanosis, or edema  site: c,d,i       73 year old male with past medical history of CAD s/p PCI to LAD and RCA in 3/2024 with residual Cx/OM disease, HTN, HLD, and lung nodule. CT of chest from 8/30/24 showing left lower lobe ground glass nodule increasing in size from prior imaging. Patient is now s/p Robo LVATS, LLL wedge x2, Completion LLLobectomy, MLND 08-Oct-2024  Post op CT maintained for air leak and high output. On 10/10-Pt developed rapid aflutter and bradycardia. Cardiology and EPS consulted. Started on Amio load, rec Eliquis on discharge. Pt s/p bedside doxycycline pleurodesis 10/14. Air leak resolved and chest tube was removed 10/16. D/W EP team Issa Schmidt NP and Dr Alvarez. Plan for pt to be discharged home on eliquis and Brilinta to continue amiodarone load as outpatient. F/u with Dr North 11/13.  Pt seen and examined by Dr Edge on day of discharge. Pt to follow up with Dr Velez in 2 weeks with CXR.     Vital Signs Last 24 Hrs  T(C): 37.2 (16 Oct 2024 12:43), Max: 37.2 (16 Oct 2024 12:43)  T(F): 99 (16 Oct 2024 12:43), Max: 99 (16 Oct 2024 12:43)  HR: 69 (16 Oct 2024 12:43) (57 - 75)  BP: 134/70 (16 Oct 2024 12:43) (104/58 - 134/70)  BP(mean): --  RR: 18 (16 Oct 2024 12:43) (16 - 18)  SpO2: 99% (16 Oct 2024 12:43) (99% - 100%)    Parameters below as of 16 Oct 2024 12:43  Patient On (Oxygen Delivery Method): room air    PHYSICAL EXAM:  Gen: NAD  Resp: Respirations unlabored. Bilateral chest rise.   Card: RRR.   GI: Soft. Nontender. Nondistended.   Skin: Warm, well perfused, no masses or organomegaly  EXT: No clubbing, cyanosis, or edema  site: c,d,i

## 2024-10-11 NOTE — DISCHARGE NOTE PROVIDER - NSDCFUSCHEDAPPT_GEN_ALL_CORE_FT
Ce Gutiérrez  [facility]  CARDIOLOGY [id number]  Scheduled Appointment: 10/29/2024     Ce Gutiérrez  [facility]  CARDIOLOGY [id number]  Scheduled Appointment: 10/29/2024    Can Acosta  [facility]  ELECTROPH [id number]  Scheduled Appointment: 11/13/2024

## 2024-10-11 NOTE — DISCHARGE NOTE PROVIDER - NSDCFUADDINST_GEN_ALL_CORE_FT
Please walk 4-5 x per day; increase as tolerated. You may climb stairs. Continue to use the incentive spirometer.   You may keep wounds uncovered. Please shower daily with soap and water. The suture will be removed in the office at the follow up appointment.   Please call the office at [phone] if you have fevers, chills, worsening shortness of breath, chest pain, warmth, redness or purulent discharge from the wound.  Please walk 4-5 x per day; increase as tolerated. You may climb stairs. Continue to use the incentive spirometer.   You may keep wounds uncovered. Please shower daily with soap and water. The suture will be removed in the office at the follow up appointment.   Please call the office at [phone] if you have fevers, chills, worsening shortness of breath, chest pain, warmth, redness or purulent discharge from the wound.     10/17  Amiodarone 400mg twice daily  10/18  Amiodarone 400mg once a day x 7 days  10/25 Amiodarone 200mg daily until follow up with electrophysiology

## 2024-10-12 PROCEDURE — 71045 X-RAY EXAM CHEST 1 VIEW: CPT | Mod: 26

## 2024-10-12 PROCEDURE — 93010 ELECTROCARDIOGRAM REPORT: CPT

## 2024-10-12 RX ADMIN — Medication 5000 UNIT(S): at 18:47

## 2024-10-12 RX ADMIN — AMIODARONE HYDROCHLORIDE 400 MILLIGRAM(S): 50 INJECTION, SOLUTION INTRAVENOUS at 06:49

## 2024-10-12 RX ADMIN — EZETIMIBE 10 MILLIGRAM(S): 10 TABLET ORAL at 12:58

## 2024-10-12 RX ADMIN — Medication 25 MILLIGRAM(S): at 06:49

## 2024-10-12 RX ADMIN — LIDOCAINE 1 PATCH: 50 CREAM TOPICAL at 13:00

## 2024-10-12 RX ADMIN — Medication 25 MILLIGRAM(S): at 18:47

## 2024-10-12 RX ADMIN — LIDOCAINE 1 PATCH: 50 CREAM TOPICAL at 18:53

## 2024-10-12 RX ADMIN — Medication 81 MILLIGRAM(S): at 12:58

## 2024-10-12 RX ADMIN — Medication 17 GRAM(S): at 21:50

## 2024-10-12 RX ADMIN — AMIODARONE HYDROCHLORIDE 400 MILLIGRAM(S): 50 INJECTION, SOLUTION INTRAVENOUS at 18:47

## 2024-10-12 RX ADMIN — Medication 5000 UNIT(S): at 06:48

## 2024-10-12 RX ADMIN — Medication 2 TABLET(S): at 21:50

## 2024-10-12 RX ADMIN — ROSUVASTATIN CALCIUM 20 MILLIGRAM(S): 20 TABLET, COATED ORAL at 21:50

## 2024-10-12 NOTE — PROGRESS NOTE ADULT - SUBJECTIVE AND OBJECTIVE BOX
Subjective: Assessment done using  services in Mandarin. Pt states he feels ok. Just pain at CT site with coughing. OOB and amb in hallway and room w/ minimal assist. On RA. No CP or SOB. Remaining SR on tele today.     Vital Signs:  Vital Signs Last 24 Hrs  T(C): 37.2 (10-12-24 @ 16:05), Max: 37.2 (10-12-24 @ 16:05)  T(F): 98.9 (10-12-24 @ 16:05), Max: 98.9 (10-12-24 @ 16:05)  HR: 60 (10-12-24 @ 16:05) (52 - 71)  BP: 131/67 (10-12-24 @ 16:05) (131/67 - 154/64)  RR: 18 (10-12-24 @ 16:05) (16 - 18)  SpO2: 99% (10-12-24 @ 16:05) (98% - 100%) on (O2)    Telemetry/Alarms: currently SR  Relevant labs, radiology and Medications reviewed                        12.7   7.40  )-----------( 130      ( 11 Oct 2024 05:18 )             36.4       Mg     1.90     10-11    TPro  6.0  /  Alb  3.4  /  TBili  0.8  /  DBili  0.2  /  AST  33  /  ALT  24  /  AlkPhos  60  10-11      MEDICATIONS  (STANDING):  acetaminophen   IVPB .. 1000 milliGRAM(s) IV Intermittent once  aMIOdarone    Tablet 400 milliGRAM(s) Oral two times a day  aMIOdarone    Tablet   Oral   aspirin enteric coated 81 milliGRAM(s) Oral daily  ezetimibe 10 milliGRAM(s) Oral daily  heparin   Injectable 5000 Unit(s) SubCutaneous every 12 hours  lidocaine   4% Patch 1 Patch Transdermal <User Schedule>  metoprolol tartrate 25 milliGRAM(s) Oral every 12 hours  polyethylene glycol 3350 17 Gram(s) Oral at bedtime  rosuvastatin 20 milliGRAM(s) Oral at bedtime  senna 2 Tablet(s) Oral at bedtime    MEDICATIONS  (PRN):  acetaminophen     Tablet .. 650 milliGRAM(s) Oral every 6 hours PRN Temp greater or equal to 38C (100.4F), Mild Pain (1 - 3)  nalbuphine Injectable 2.5 milliGRAM(s) IV Push every 6 hours PRN Pruritus  naloxone Injectable 0.1 milliGRAM(s) IV Push every 3 minutes PRN For ANY of the following changes in patient status:  A. RR LESS THAN 10 breaths per minute, B. Oxygen saturation LESS THAN 90%, C. Sedation score of 6  ondansetron Injectable 4 milliGRAM(s) IV Push every 6 hours PRN Nausea  oxyCODONE    IR 2.5 milliGRAM(s) Oral every 4 hours PRN Moderate Pain (4 - 6)  oxyCODONE    IR 5 milliGRAM(s) Oral every 4 hours PRN Severe Pain (7 - 10)    General: WD NAD  Neurology: A&Ox3, nonfocal, ALFREDO x 4  Eyes: PERRLA/ EOMI, Gross vision intact  ENT/Neck: Neck supple, trachea midline, No JVD, Gross hearing intact  Respiratory: CTA B/L, No wheezing, rales, rhonchi. Dec BS to left base  CV: RRR, S1S2, no murmurs, rubs or gallops  Abdominal: Soft, NT, ND +BS, + BM this am, voiding.   Extremities: No edema, + peripheral pulses  Skin: No Rashes, Hematoma, Ecchymosis  Incisions: left VATS c/d/i.   Tubes: left CT 100cc/24hrs on WS, + FEAL  I&O's Summary    11 Oct 2024 07:01  -  12 Oct 2024 07:00  --------------------------------------------------------  IN: 200 mL / OUT: 1200 mL / NET: -1000 mL        Assessment  73y Male  w/ PAST MEDICAL & SURGICAL HISTORY:  CAD (coronary artery disease)      Hypertension      Solitary pulmonary nodule      Current smoker      Stroke      Left hydrocele      H/O abdominal surgery      History of percutaneous coronary intervention      S/P cataract surgery    ASSESSMENT  73 year old male with past medical history of CAD s/p PCI to LAD and RCA in 3/2024 with residual Cx/OM disease, HTN, HLD, and lung nodule. CT of chest from 8/30/24 showing left lower lobe ground glass nodule increasing in size from prior imaging. Patient is now s/p  PROCEDURES: Robo LVATS, LLL wedge x2, Completion LLLobectomy, MLND 08-Oct-2024  Post op CT maintained for air leak and high output. On 10/10-Pt developed rapid aflutter and bradycardia. Cardiology and EPS consulted. STarted on AMio load 10/11- Tele SR. CT with air leak.       PLAN  Neuro: Pain management  Pulm: Encourage coughing, deep breathing and use of incentive spirometry. . Daily CXR.   Cardio: Monitor telemetry/alarms. Continue Amio. Echo done. Labs WNL. Per DR. Velez, will hold off on a/c for now because of CT output. Will start Eliquis once CT removed. Will need to clarify with EPS if ablation still required since pt now in SR.   GI: Tolerating diet. Continue stool softeners.  Renal: monitor urine output, supplement electrolytes as needed  Vasc: Heparin SC/SCDs for DVT prophylaxis  Heme: Stable H/H. .   ID: Off antibiotics. Stable.  Therapy: OOB/ambulate  Tubes: Monitor Chest tube output and air leak. Cont Waterseal.   Disposition: Aim to D/C to home once CT removed and cleared by Cardiology  Discussed with Cardiothoracic Team at AM rounds.

## 2024-10-13 PROCEDURE — 71045 X-RAY EXAM CHEST 1 VIEW: CPT | Mod: 26

## 2024-10-13 RX ADMIN — EZETIMIBE 10 MILLIGRAM(S): 10 TABLET ORAL at 11:56

## 2024-10-13 RX ADMIN — AMIODARONE HYDROCHLORIDE 400 MILLIGRAM(S): 50 INJECTION, SOLUTION INTRAVENOUS at 05:30

## 2024-10-13 RX ADMIN — Medication 5000 UNIT(S): at 17:40

## 2024-10-13 RX ADMIN — Medication 5000 UNIT(S): at 05:31

## 2024-10-13 RX ADMIN — ROSUVASTATIN CALCIUM 20 MILLIGRAM(S): 20 TABLET, COATED ORAL at 21:42

## 2024-10-13 RX ADMIN — Medication 25 MILLIGRAM(S): at 05:31

## 2024-10-13 RX ADMIN — LIDOCAINE 1 PATCH: 50 CREAM TOPICAL at 11:57

## 2024-10-13 RX ADMIN — Medication 25 MILLIGRAM(S): at 17:39

## 2024-10-13 RX ADMIN — AMIODARONE HYDROCHLORIDE 400 MILLIGRAM(S): 50 INJECTION, SOLUTION INTRAVENOUS at 17:40

## 2024-10-13 RX ADMIN — LIDOCAINE 1 PATCH: 50 CREAM TOPICAL at 19:30

## 2024-10-13 RX ADMIN — LIDOCAINE 1 PATCH: 50 CREAM TOPICAL at 23:00

## 2024-10-13 RX ADMIN — Medication 81 MILLIGRAM(S): at 11:56

## 2024-10-13 NOTE — PROGRESS NOTE ADULT - SUBJECTIVE AND OBJECTIVE BOX
Pt is sitting at the edge of bed, eating breakfast, No acute complaints.       Vital Signs:  Vital Signs Last 24 Hrs  T(C): 36.3 (10-13-24 @ 08:00), Max: 37.2 (10-12-24 @ 16:05)  T(F): 97.4 (10-13-24 @ 08:00), Max: 98.9 (10-12-24 @ 16:05)  HR: 57 (10-13-24 @ 08:00) (52 - 76)  BP: 124/68 (10-13-24 @ 08:00) (123/64 - 146/65)  RR: 18 (10-13-24 @ 08:00) (16 - 18)  SpO2: 100% (10-13-24 @ 08:00) (98% - 100%) on (O2)    Telemetry/Alarms:    Relevant labs, radiology and Medications reviewed            MEDICATIONS  (STANDING):  acetaminophen   IVPB .. 1000 milliGRAM(s) IV Intermittent once  aMIOdarone    Tablet 400 milliGRAM(s) Oral two times a day  aMIOdarone    Tablet   Oral   aspirin enteric coated 81 milliGRAM(s) Oral daily  ezetimibe 10 milliGRAM(s) Oral daily  heparin   Injectable 5000 Unit(s) SubCutaneous every 12 hours  lidocaine   4% Patch 1 Patch Transdermal <User Schedule>  metoprolol tartrate 25 milliGRAM(s) Oral every 12 hours  polyethylene glycol 3350 17 Gram(s) Oral at bedtime  rosuvastatin 20 milliGRAM(s) Oral at bedtime  senna 2 Tablet(s) Oral at bedtime    MEDICATIONS  (PRN):  acetaminophen     Tablet .. 650 milliGRAM(s) Oral every 6 hours PRN Temp greater or equal to 38C (100.4F), Mild Pain (1 - 3)  nalbuphine Injectable 2.5 milliGRAM(s) IV Push every 6 hours PRN Pruritus  naloxone Injectable 0.1 milliGRAM(s) IV Push every 3 minutes PRN For ANY of the following changes in patient status:  A. RR LESS THAN 10 breaths per minute, B. Oxygen saturation LESS THAN 90%, C. Sedation score of 6  ondansetron Injectable 4 milliGRAM(s) IV Push every 6 hours PRN Nausea  oxyCODONE    IR 2.5 milliGRAM(s) Oral every 4 hours PRN Moderate Pain (4 - 6)  oxyCODONE    IR 5 milliGRAM(s) Oral every 4 hours PRN Severe Pain (7 - 10)      Social:  Denies Smoking, Drinking illict drug use    Physical exam  General: WN/WD NAD  Neurology: Awake, nonfocal, ALFREDO x 4  Eyes: Scleras clear, PERRLA/ EOMI, Gross vision intact  ENT:Gross hearing intact, grossly patent pharynx, no stridor  Neck: Neck supple, trachea midline, No JVD,   Respiratory: CTA B/L, No wheezing, rales, rhonchi  CV: RRR, S1S2, no murmurs, rubs or gallops  Abdominal: Soft, NT, ND +BS,   Extremities: No edema, + peripheral pulses  Skin: No Rashes, Hematoma, Ecchymosis  Lymphatic: No Neck, axilla, groin LAD  Psych: Oriented x 3, normal affect  Incisions: c,d,i  Tubes: L CT to WS, 190cc/24 Hours. Small FEAL after 5+ coughs    I&O's Summary    12 Oct 2024 07:01  -  13 Oct 2024 07:00  --------------------------------------------------------  IN: 350 mL / OUT: 990 mL / NET: -640 mL        Assessment  73 year old male with past medical history of CAD s/p PCI to LAD and RCA in 3/2024 with residual Cx/OM disease, HTN, HLD, and lung nodule. CT of chest from 8/30/24 showing left lower lobe ground glass nodule increasing in size from prior imaging. Patient is now s/p  PROCEDURES: Robo LVATS, LLL wedge x2, Completion LLLobectomy, MLND 08-Oct-2024  Post op CT maintained for air leak and high output. On 10/10-Pt developed rapid aflutter and bradycardia. Cardiology and EPS consulted. STarted on AMio load 10/11- Tele SR. CT with very small FEAL.    PLAN  Neuro: Pain management  Pulm: Encourage coughing, deep breathing and use of incentive spirometry. Wean off supplemental oxygen as able. Daily CXR.   Cardio: Monitor telemetry/alarms  GI: Tolerating diet. Continue stool softeners.  Renal: monitor urine output, supplement electrolytes as needed  Vasc: Heparin SC/SCDs for DVT prophylaxis  Heme: Stable H/H. .   ID: Off antibiotics. Stable.  Therapy: OOB/ambulate  Tubes: Monitor Chest tube output  Disposition: Aim to D/C to home on when CT removed +/- ablation from EP  Discussed with Cardiothoracic Team at AM rounds.

## 2024-10-14 PROCEDURE — ZZZZZ: CPT

## 2024-10-14 PROCEDURE — 71045 X-RAY EXAM CHEST 1 VIEW: CPT | Mod: 26

## 2024-10-14 PROCEDURE — 99231 SBSQ HOSP IP/OBS SF/LOW 25: CPT

## 2024-10-14 RX ORDER — HYDROMORPHONE HYDROCHLORIDE 1 MG/ML
1 INJECTION, SOLUTION INTRAMUSCULAR; INTRAVENOUS; SUBCUTANEOUS ONCE
Refills: 0 | Status: DISCONTINUED | OUTPATIENT
Start: 2024-10-14 | End: 2024-10-14

## 2024-10-14 RX ORDER — DIPHENHYDRAMINE HCL 12.5MG/5ML
25 LIQUID (ML) ORAL ONCE
Refills: 0 | Status: COMPLETED | OUTPATIENT
Start: 2024-10-14 | End: 2024-10-14

## 2024-10-14 RX ORDER — LIDOCAINE HCL 20 MG/ML
15 AMPUL (ML) INJECTION ONCE
Refills: 0 | Status: COMPLETED | OUTPATIENT
Start: 2024-10-14 | End: 2024-10-14

## 2024-10-14 RX ORDER — DOXYCYCLINE HYCLATE 100 MG
500 CAPSULE ORAL ONCE
Refills: 0 | Status: COMPLETED | OUTPATIENT
Start: 2024-10-14 | End: 2024-10-14

## 2024-10-14 RX ORDER — ACETAMINOPHEN 325 MG
975 TABLET ORAL ONCE
Refills: 0 | Status: COMPLETED | OUTPATIENT
Start: 2024-10-14 | End: 2024-10-14

## 2024-10-14 RX ADMIN — Medication 500 MILLIGRAM(S): at 13:21

## 2024-10-14 RX ADMIN — Medication 25 MILLIGRAM(S): at 18:06

## 2024-10-14 RX ADMIN — HYDROMORPHONE HYDROCHLORIDE 1 MILLIGRAM(S): 1 INJECTION, SOLUTION INTRAMUSCULAR; INTRAVENOUS; SUBCUTANEOUS at 13:02

## 2024-10-14 RX ADMIN — Medication 81 MILLIGRAM(S): at 11:40

## 2024-10-14 RX ADMIN — LIDOCAINE 1 PATCH: 50 CREAM TOPICAL at 21:51

## 2024-10-14 RX ADMIN — HYDROMORPHONE HYDROCHLORIDE 1 MILLIGRAM(S): 1 INJECTION, SOLUTION INTRAMUSCULAR; INTRAVENOUS; SUBCUTANEOUS at 13:42

## 2024-10-14 RX ADMIN — OXYCODONE HYDROCHLORIDE 5 MILLIGRAM(S): 30 TABLET, FILM COATED, EXTENDED RELEASE ORAL at 15:10

## 2024-10-14 RX ADMIN — Medication 975 MILLIGRAM(S): at 11:40

## 2024-10-14 RX ADMIN — Medication 5000 UNIT(S): at 18:05

## 2024-10-14 RX ADMIN — Medication 25 MILLIGRAM(S): at 05:16

## 2024-10-14 RX ADMIN — ROSUVASTATIN CALCIUM 20 MILLIGRAM(S): 20 TABLET, COATED ORAL at 21:48

## 2024-10-14 RX ADMIN — LIDOCAINE 1 PATCH: 50 CREAM TOPICAL at 11:40

## 2024-10-14 RX ADMIN — AMIODARONE HYDROCHLORIDE 400 MILLIGRAM(S): 50 INJECTION, SOLUTION INTRAVENOUS at 05:16

## 2024-10-14 RX ADMIN — HYDROMORPHONE HYDROCHLORIDE 1 MILLIGRAM(S): 1 INJECTION, SOLUTION INTRAMUSCULAR; INTRAVENOUS; SUBCUTANEOUS at 13:32

## 2024-10-14 RX ADMIN — Medication 5000 UNIT(S): at 05:16

## 2024-10-14 RX ADMIN — AMIODARONE HYDROCHLORIDE 400 MILLIGRAM(S): 50 INJECTION, SOLUTION INTRAVENOUS at 18:06

## 2024-10-14 RX ADMIN — EZETIMIBE 10 MILLIGRAM(S): 10 TABLET ORAL at 11:40

## 2024-10-14 RX ADMIN — HYDROMORPHONE HYDROCHLORIDE 1 MILLIGRAM(S): 1 INJECTION, SOLUTION INTRAMUSCULAR; INTRAVENOUS; SUBCUTANEOUS at 14:17

## 2024-10-14 RX ADMIN — OXYCODONE HYDROCHLORIDE 5 MILLIGRAM(S): 30 TABLET, FILM COATED, EXTENDED RELEASE ORAL at 15:47

## 2024-10-14 RX ADMIN — Medication 975 MILLIGRAM(S): at 12:10

## 2024-10-14 RX ADMIN — Medication 15 MILLILITER(S): at 13:21

## 2024-10-14 RX ADMIN — Medication 25 MILLIGRAM(S): at 11:40

## 2024-10-14 NOTE — PROGRESS NOTE ADULT - SUBJECTIVE AND OBJECTIVE BOX
Subjective: Assessment done with [facility]  services. Pt states he feels fine. No pain or SOB. All questions answered. Discussed w pt plan for bedside Doxy pleurodesis today, including side effects of pain/risks/benefits, pt is willing to proceed with bedside procedure. OOB and amb ad roxana. Currently on TEle in SB 50s. Discussed w EPS team plans for possible ablation.     Vital Signs:  Vital Signs Last 24 Hrs  T(C): 36.6 (10-14-24 @ 08:15), Max: 36.9 (10-14-24 @ 01:37)  T(F): 97.9 (10-14-24 @ 08:15), Max: 98.5 (10-14-24 @ 01:37)  HR: 50 (10-14-24 @ 08:15) (50 - 62)  BP: 140/65 (10-14-24 @ 08:15) (120/60 - 147/67)  RR: 18 (10-14-24 @ 08:15) (18 - 18)  SpO2: 99% (10-14-24 @ 08:15) (97% - 99%) on (O2)    Telemetry/Alarms: SB 50s  Relevant labs, radiology and Medications reviewed    CXR no obvious ptx, no effusion.         MEDICATIONS  (STANDING):  acetaminophen   IVPB .. 1000 milliGRAM(s) IV Intermittent once  aMIOdarone    Tablet 400 milliGRAM(s) Oral two times a day  aMIOdarone    Tablet   Oral   aspirin enteric coated 81 milliGRAM(s) Oral daily  doxycycline Intrapleural Syringe 500 milliGRAM(s) IntraPleural. once  ezetimibe 10 milliGRAM(s) Oral daily  heparin   Injectable 5000 Unit(s) SubCutaneous every 12 hours  lidocaine   4% Patch 1 Patch Transdermal <User Schedule>  metoprolol tartrate 25 milliGRAM(s) Oral every 12 hours  polyethylene glycol 3350 17 Gram(s) Oral at bedtime  rosuvastatin 20 milliGRAM(s) Oral at bedtime  senna 2 Tablet(s) Oral at bedtime    MEDICATIONS  (PRN):  acetaminophen     Tablet .. 650 milliGRAM(s) Oral every 6 hours PRN Temp greater or equal to 38C (100.4F), Mild Pain (1 - 3)  nalbuphine Injectable 2.5 milliGRAM(s) IV Push every 6 hours PRN Pruritus  naloxone Injectable 0.1 milliGRAM(s) IV Push every 3 minutes PRN For ANY of the following changes in patient status:  A. RR LESS THAN 10 breaths per minute, B. Oxygen saturation LESS THAN 90%, C. Sedation score of 6  ondansetron Injectable 4 milliGRAM(s) IV Push every 6 hours PRN Nausea  oxyCODONE    IR 2.5 milliGRAM(s) Oral every 4 hours PRN Moderate Pain (4 - 6)  oxyCODONE    IR 5 milliGRAM(s) Oral every 4 hours PRN Severe Pain (7 - 10)    General: WD NAD  Neurology: A&Ox3, nonfocal, ALFREDO x 4  Eyes: PERRLA/ EOMI, Gross vision intact  ENT/Neck: Neck supple, trachea midline, No JVD, Gross hearing intact  Respiratory: CTA B/L, No wheezing, rales, rhonchi  CV: RRR, S1S2, no murmurs, rubs or gallops. Slight dec to left base  Abdominal: Soft, NT, ND +BS, + voiding, + BM  Extremities: No edema, + peripheral pulses  Skin: No Rashes, Hematoma, Ecchymosis  Incisions: left VATS c/d/i  Tubes: Left CT- 160cc/24hrs on WS, still + FEAL  I&O's Summary    13 Oct 2024 07:01  -  14 Oct 2024 07:00  --------------------------------------------------------  IN: 0 mL / OUT: 460 mL / NET: -460 mL        Assessment  73y Male  w/ PAST MEDICAL & SURGICAL HISTORY:  CAD (coronary artery disease)      Hypertension      Solitary pulmonary nodule      Current smoker      Stroke      Left hydrocele      H/O abdominal surgery      History of percutaneous coronary intervention      S/P cataract surgery    ASSESSMENT  73 year old male with past medical history of CAD s/p PCI to LAD and RCA in 3/2024 with residual Cx/OM disease, HTN, HLD, and lung nodule. CT of chest from 8/30/24 showing left lower lobe ground glass nodule increasing in size from prior imaging. Patient is now s/p  PROCEDURES: Robo LVATS, LLL wedge x2, Completion LLLobectomy, MLND 08-Oct-2024  Post op CT maintained for air leak and high output. On 10/10-Pt developed rapid aflutter and bradycardia. Cardiology and EPS consulted. STarted on AMio load 10/11- Tele SR. CT with air leak. 10/11-10/14- Still with air leak. Plan for Doxy pleurodesis today. F/u plans with EPS, cannot start Eliquis until CT removed.       PLAN  Neuro: Pain management  Pulm: Encourage coughing, deep breathing and use of incentive spirometry. . Daily CXR.   Cardio: Monitor telemetry/alarms. Continue Amio. Echo done. Labs WNL. Per DR. Velez, will hold off on a/c for now because of CT output. Will start Eliquis once CT removed. Will need to clarify with EPS ablation plans.  GI: Tolerating diet. Continue stool softeners.  Renal: monitor urine output, supplement electrolytes as needed  Vasc: Heparin SC/SCDs for DVT prophylaxis  Heme: Stable H/H. .   ID: Off antibiotics. Stable.  Therapy: OOB/ambulate  Tubes: Monitor Chest tube output and air leak. Cont Waterseal. Will do Doxy pleurodesis today.   Disposition: Aim to D/C to home once CT removed and cleared by Cardiology  Discussed with Cardiothoracic Team at AM rounds.

## 2024-10-14 NOTE — CHART NOTE - NSCHARTNOTEFT_GEN_A_CORE
Informed consent obtained using Mandarin  and pt's family at bedside. Under aseptic technique, 500mg Doxycycline given via pt's left Chest tube for purpose of pleurodesis. Pt premedicated w tylenol, benadryl, IVP dilaudid and  intra pleural Lido. Sterile NS flush given as well. Pt tolerated procedure well. Will elevate Pleuravac x 2hrs then lower and place to suction.

## 2024-10-14 NOTE — PROGRESS NOTE ADULT - SUBJECTIVE AND OBJECTIVE BOX
Patient is seen and examined. Patient denies any chest pain, SOB, palpitations or dizziness. He feels better  Telemetry shows sinus rhythm with HR 50s-70s and occ. blocked PACs    PAST MEDICAL & SURGICAL HISTORY:  CAD (coronary artery disease)    Hypertension    Solitary pulmonary nodule    Current smoker    Stroke    Left hydrocele    Contraindication to percutaneous coronary intervention (PCI) for medical reason    H/O abdominal surgery    History of percutaneous coronary intervention    S/P cataract surgery        MEDICATIONS  (STANDING):  acetaminophen   IVPB .. 1000 milliGRAM(s) IV Intermittent once  aMIOdarone    Tablet 400 milliGRAM(s) Oral two times a day  aMIOdarone    Tablet   Oral   aspirin enteric coated 81 milliGRAM(s) Oral daily  ezetimibe 10 milliGRAM(s) Oral daily  heparin   Injectable 5000 Unit(s) SubCutaneous every 12 hours  lidocaine   4% Patch 1 Patch Transdermal <User Schedule>  metoprolol tartrate 25 milliGRAM(s) Oral every 12 hours  polyethylene glycol 3350 17 Gram(s) Oral at bedtime  rosuvastatin 20 milliGRAM(s) Oral at bedtime  senna 2 Tablet(s) Oral at bedtime    MEDICATIONS  (PRN):  acetaminophen     Tablet .. 650 milliGRAM(s) Oral every 6 hours PRN Temp greater or equal to 38C (100.4F), Mild Pain (1 - 3)  nalbuphine Injectable 2.5 milliGRAM(s) IV Push every 6 hours PRN Pruritus  naloxone Injectable 0.1 milliGRAM(s) IV Push every 3 minutes PRN For ANY of the following changes in patient status:  A. RR LESS THAN 10 breaths per minute, B. Oxygen saturation LESS THAN 90%, C. Sedation score of 6  ondansetron Injectable 4 milliGRAM(s) IV Push every 6 hours PRN Nausea  oxyCODONE    IR 2.5 milliGRAM(s) Oral every 4 hours PRN Moderate Pain (4 - 6)  oxyCODONE    IR 5 milliGRAM(s) Oral every 4 hours PRN Severe Pain (7 - 10)      Vital Signs Last 24 Hrs  T(C): 36.6 (14 Oct 2024 08:15), Max: 36.9 (14 Oct 2024 01:37)  T(F): 97.9 (14 Oct 2024 08:15), Max: 98.5 (14 Oct 2024 01:37)  HR: 50 (14 Oct 2024 08:15) (50 - 62)  BP: 140/65 (14 Oct 2024 08:15) (120/60 - 147/67)  BP(mean): --  RR: 18 (14 Oct 2024 08:15) (18 - 18)  SpO2: 99% (14 Oct 2024 08:15) (97% - 99%)    Parameters below as of 14 Oct 2024 08:15  Patient On (Oxygen Delivery Method): room air      LABS: pending    I&O's Summary    13 Oct 2024 07:01  -  14 Oct 2024 07:00  --------------------------------------------------------  IN: 0 mL / OUT: 460 mL / NET: -460 mL      PHYSICAL EXAM:    GENERAL: In no apparent distress, well nourished, and hydrated.  HEART: Regular rate and rhythm; No murmurs, rubs, or gallops.  PULMONARY: Clear to auscultation and percussion.  No rales, wheezing, or rhonchi bilaterally.  ABDOMEN: Soft, Nontender, Nondistended; Bowel sounds present  EXTREMITIES:  2+ Peripheral Pulses, No clubbing, cyanosis, or edema

## 2024-10-15 PROCEDURE — 71045 X-RAY EXAM CHEST 1 VIEW: CPT | Mod: 26

## 2024-10-15 RX ADMIN — EZETIMIBE 10 MILLIGRAM(S): 10 TABLET ORAL at 11:47

## 2024-10-15 RX ADMIN — ROSUVASTATIN CALCIUM 20 MILLIGRAM(S): 20 TABLET, COATED ORAL at 21:22

## 2024-10-15 RX ADMIN — OXYCODONE HYDROCHLORIDE 5 MILLIGRAM(S): 30 TABLET, FILM COATED, EXTENDED RELEASE ORAL at 18:00

## 2024-10-15 RX ADMIN — Medication 25 MILLIGRAM(S): at 05:24

## 2024-10-15 RX ADMIN — Medication 5000 UNIT(S): at 17:51

## 2024-10-15 RX ADMIN — OXYCODONE HYDROCHLORIDE 5 MILLIGRAM(S): 30 TABLET, FILM COATED, EXTENDED RELEASE ORAL at 18:30

## 2024-10-15 RX ADMIN — LIDOCAINE 1 PATCH: 50 CREAM TOPICAL at 11:46

## 2024-10-15 RX ADMIN — AMIODARONE HYDROCHLORIDE 400 MILLIGRAM(S): 50 INJECTION, SOLUTION INTRAVENOUS at 17:51

## 2024-10-15 RX ADMIN — Medication 25 MILLIGRAM(S): at 17:51

## 2024-10-15 RX ADMIN — Medication 5000 UNIT(S): at 05:18

## 2024-10-15 RX ADMIN — LIDOCAINE 1 PATCH: 50 CREAM TOPICAL at 22:50

## 2024-10-15 RX ADMIN — LIDOCAINE 1 PATCH: 50 CREAM TOPICAL at 00:06

## 2024-10-15 RX ADMIN — Medication 81 MILLIGRAM(S): at 11:47

## 2024-10-15 RX ADMIN — LIDOCAINE 1 PATCH: 50 CREAM TOPICAL at 20:00

## 2024-10-15 RX ADMIN — AMIODARONE HYDROCHLORIDE 400 MILLIGRAM(S): 50 INJECTION, SOLUTION INTRAVENOUS at 05:17

## 2024-10-15 NOTE — DIETITIAN INITIAL EVALUATION ADULT - PERTINENT MEDS FT
MEDICATIONS  (STANDING):  acetaminophen   IVPB .. 1000 milliGRAM(s) IV Intermittent once  aMIOdarone    Tablet 400 milliGRAM(s) Oral two times a day  aMIOdarone    Tablet   Oral   aspirin enteric coated 81 milliGRAM(s) Oral daily  ezetimibe 10 milliGRAM(s) Oral daily  heparin   Injectable 5000 Unit(s) SubCutaneous every 12 hours  lidocaine   4% Patch 1 Patch Transdermal <User Schedule>  metoprolol tartrate 25 milliGRAM(s) Oral every 12 hours  polyethylene glycol 3350 17 Gram(s) Oral at bedtime  rosuvastatin 20 milliGRAM(s) Oral at bedtime  senna 2 Tablet(s) Oral at bedtime    MEDICATIONS  (PRN):  acetaminophen     Tablet .. 650 milliGRAM(s) Oral every 6 hours PRN Temp greater or equal to 38C (100.4F), Mild Pain (1 - 3)  nalbuphine Injectable 2.5 milliGRAM(s) IV Push every 6 hours PRN Pruritus  naloxone Injectable 0.1 milliGRAM(s) IV Push every 3 minutes PRN For ANY of the following changes in patient status:  A. RR LESS THAN 10 breaths per minute, B. Oxygen saturation LESS THAN 90%, C. Sedation score of 6  ondansetron Injectable 4 milliGRAM(s) IV Push every 6 hours PRN Nausea  oxyCODONE    IR 2.5 milliGRAM(s) Oral every 4 hours PRN Moderate Pain (4 - 6)  oxyCODONE    IR 5 milliGRAM(s) Oral every 4 hours PRN Severe Pain (7 - 10)

## 2024-10-15 NOTE — PHYSICAL THERAPY INITIAL EVALUATION ADULT - GENERAL OBSERVATIONS, REHAB EVAL
Pt received seated in chair, +chest tube to suction, +telemetry, all lines intact, in NAD. Pt agreeable to PT evaluation.

## 2024-10-15 NOTE — PHYSICAL THERAPY INITIAL EVALUATION ADULT - DID THE PATIENT HAVE SURGERY?
Called patient to confirm appointment with Dr. Rizo for 1/24/24. Left message asking to come in for nurse visit at 11 before appointment with Dr. Rizo.    Segmentectomy, lung, using VATS/yes

## 2024-10-15 NOTE — DIETITIAN INITIAL EVALUATION ADULT - NSFNSGIIOFT_GEN_A_CORE
10-14-24 @ 07:01  -  10-15-24 @ 07:00  --------------------------------------------------------  OUT:    Chest Tube (mL): 350 mL  Total OUT: 350 mL    Total NET: -350 mL

## 2024-10-15 NOTE — PHYSICAL THERAPY INITIAL EVALUATION ADULT - ADDITIONAL COMMENTS
Pt lives in an apartment with wife, no stairs to negotiate, was independent with all functional mobility using a rolling walker, has home health attendant x2 days per week to assist with ADLs.    Pt left seated in recliner at bedside, all lines intact, all needs in reach, in NAD. PHILLIP Campbell aware. Heart rate 75 beats per minute.

## 2024-10-15 NOTE — DIETITIAN INITIAL EVALUATION ADULT - COLLABORATION WITH OTHER PROVIDERS
Food and Nutrition Service department to honor food and fluid preferences as able within therapeutic diet.

## 2024-10-15 NOTE — DIETITIAN INITIAL EVALUATION ADULT - ADD RECOMMEND
1. Continue current PO order as prescribed.   2. Nursing to consistently document % PO intake in RN flow sheets and monitor weekly weights.   3. Continue to monitor skin integrity, bowel regimen, and nutrition pertinent labs.

## 2024-10-15 NOTE — PROGRESS NOTE ADULT - SUBJECTIVE AND OBJECTIVE BOX
Patient is seen and examined. He denies any chest pain, SOB, palpitations or dizziness. Patient is still with chest tube as CT showing air leak  Telemetry shows sinus rhythm with HR 50s-70s  Spoke to patient using Mandarin  ID# [id number]      PAST MEDICAL & SURGICAL HISTORY:  CAD (coronary artery disease)    Hypertension    Solitary pulmonary nodule    Current smoker    Stroke    Left hydrocele    Contraindication to percutaneous coronary intervention (PCI) for medical reason    H/O abdominal surgery    History of percutaneous coronary intervention    S/P cataract surgery        MEDICATIONS  (STANDING):  acetaminophen   IVPB .. 1000 milliGRAM(s) IV Intermittent once  aMIOdarone    Tablet   Oral   aMIOdarone    Tablet 400 milliGRAM(s) Oral two times a day  aspirin enteric coated 81 milliGRAM(s) Oral daily  ezetimibe 10 milliGRAM(s) Oral daily  heparin   Injectable 5000 Unit(s) SubCutaneous every 12 hours  lidocaine   4% Patch 1 Patch Transdermal <User Schedule>  metoprolol tartrate 25 milliGRAM(s) Oral every 12 hours  polyethylene glycol 3350 17 Gram(s) Oral at bedtime  rosuvastatin 20 milliGRAM(s) Oral at bedtime  senna 2 Tablet(s) Oral at bedtime    MEDICATIONS  (PRN):  acetaminophen     Tablet .. 650 milliGRAM(s) Oral every 6 hours PRN Temp greater or equal to 38C (100.4F), Mild Pain (1 - 3)  nalbuphine Injectable 2.5 milliGRAM(s) IV Push every 6 hours PRN Pruritus  naloxone Injectable 0.1 milliGRAM(s) IV Push every 3 minutes PRN For ANY of the following changes in patient status:  A. RR LESS THAN 10 breaths per minute, B. Oxygen saturation LESS THAN 90%, C. Sedation score of 6  ondansetron Injectable 4 milliGRAM(s) IV Push every 6 hours PRN Nausea  oxyCODONE    IR 2.5 milliGRAM(s) Oral every 4 hours PRN Moderate Pain (4 - 6)  oxyCODONE    IR 5 milliGRAM(s) Oral every 4 hours PRN Severe Pain (7 - 10)      Vital Signs Last 24 Hrs  T(C): 36.7 (15 Oct 2024 08:00), Max: 36.8 (15 Oct 2024 00:46)  T(F): 98 (15 Oct 2024 08:00), Max: 98.3 (15 Oct 2024 00:46)  HR: 66 (15 Oct 2024 08:00) (50 - 68)  BP: 141/76 (15 Oct 2024 08:00) (124/64 - 185/80)  BP(mean): --  RR: 18 (15 Oct 2024 08:00) (18 - 18)  SpO2: 100% (15 Oct 2024 08:00) (97% - 100%)    Parameters below as of 15 Oct 2024 08:00  Patient On (Oxygen Delivery Method): room air    LABS:    I&O's Summary    14 Oct 2024 07:01  -  15 Oct 2024 07:00  --------------------------------------------------------  IN: 745 mL / OUT: 850 mL / NET: -105 mL    PHYSICAL EXAM:    GENERAL: In no apparent distress, well nourished, and hydrated.  HEART: Regular rate and rhythm; No murmurs, rubs, or gallops.  PULMONARY: Clear to auscultation, left chest tube in place  ABDOMEN: Soft, Nontender, Nondistended; Bowel sounds present  EXTREMITIES:  2+ Peripheral Pulses, No clubbing, cyanosis, or edema

## 2024-10-15 NOTE — DIETITIAN INITIAL EVALUATION ADULT - OTHER INFO
Patient is receiving a regular diet order. Denies difficulty chewing or swallowing. Reports adequate PO intake with fair-good appetite. Intake varies from 25-75% per RN flowsheet. Patient endorses disliking hospital foods, prefers home cook Chinese foods. Family has been bringing foods daily, at least for two meals per day. RD offered to review meal selection, patient declines at this time stating that he does not want to bother other people. No nausea, vomit, diarrhea, constipation, + BM on 10/14 per RN flowhseet. Pt is currently on bowel regimen and ondansetron PRN. No edema, no pressure injury per RN flowsheets. Labs all within acceptable ranges. Dosing weight is 59kg (10/8), BMI calculated at 21.7 via height 165.1cm. Patient declines nutritional supplement when RD offered. RD to remain available for further nutrition intervention as indicated.

## 2024-10-15 NOTE — PROGRESS NOTE ADULT - SUBJECTIVE AND OBJECTIVE BOX
Subjective: patient seen and examined with thoracic surgery team  bedside staff assisted in Mandarin translation   pt without acute complaints  admits pain w doxycycline pleurodesis yesterday but pain controlled now  pt denies chest pain or shortness of breath  using incentive spirometer  on bowel regimen, +flatus, +BM  ambulatory with assistance  must be on suction at all times  EPS team following for possible ablation.   d/w attending on morning TEAMS report      Vital Signs:  Vital Signs Last 24 Hrs  T(C): 36.6 (10-15-24 @ 05:16), Max: 36.8 (10-15-24 @ 00:46)  T(F): 97.8 (10-15-24 @ 05:16), Max: 98.3 (10-15-24 @ 00:46)  HR: 68 (10-15-24 @ 05:16) (50 - 68)  BP: 140/60 (10-15-24 @ 05:16) (124/64 - 185/80)  RR: 18 (10-15-24 @ 05:16) (18 - 18)  SpO2: 98% (10-15-24 @ 05:16) (97% - 100%) on (O2)    PE  Telemetry/Alarms: SR  General: awake and alert NAD  Neurology: A&Ox3, nonfocal, ALFREDO x 4  Respiratory: CTA B/L  CV: RRR, S1S2, no murmurs, rubs or gallops  Abdominal: Soft, NT, ND +BS, Last BM  Extremities: No edema, + peripheral pulses  Incisions: c,d,i  Tubes: L chest tube drained 350cc/24h serosanguinous fluid  on suction no air leak  Relevant labs, radiology and Medications reviewed            MEDICATIONS  (STANDING):  acetaminophen   IVPB .. 1000 milliGRAM(s) IV Intermittent once  aMIOdarone    Tablet 400 milliGRAM(s) Oral two times a day  aMIOdarone    Tablet   Oral   aspirin enteric coated 81 milliGRAM(s) Oral daily  ezetimibe 10 milliGRAM(s) Oral daily  heparin   Injectable 5000 Unit(s) SubCutaneous every 12 hours  lidocaine   4% Patch 1 Patch Transdermal <User Schedule>  metoprolol tartrate 25 milliGRAM(s) Oral every 12 hours  polyethylene glycol 3350 17 Gram(s) Oral at bedtime  rosuvastatin 20 milliGRAM(s) Oral at bedtime  senna 2 Tablet(s) Oral at bedtime    MEDICATIONS  (PRN):  acetaminophen     Tablet .. 650 milliGRAM(s) Oral every 6 hours PRN Temp greater or equal to 38C (100.4F), Mild Pain (1 - 3)  nalbuphine Injectable 2.5 milliGRAM(s) IV Push every 6 hours PRN Pruritus  naloxone Injectable 0.1 milliGRAM(s) IV Push every 3 minutes PRN For ANY of the following changes in patient status:  A. RR LESS THAN 10 breaths per minute, B. Oxygen saturation LESS THAN 90%, C. Sedation score of 6  ondansetron Injectable 4 milliGRAM(s) IV Push every 6 hours PRN Nausea  oxyCODONE    IR 2.5 milliGRAM(s) Oral every 4 hours PRN Moderate Pain (4 - 6)  oxyCODONE    IR 5 milliGRAM(s) Oral every 4 hours PRN Severe Pain (7 - 10)    Pertinent Physical Exam  I&O's Summary    13 Oct 2024 07:01  -  14 Oct 2024 07:00  --------------------------------------------------------  IN: 0 mL / OUT: 460 mL / NET: -460 mL    14 Oct 2024 07:01  -  15 Oct 2024 06:57  --------------------------------------------------------  IN: 745 mL / OUT: 850 mL / NET: -105 mL          PAST MEDICAL & SURGICAL HISTORY:  CAD (coronary artery disease)  Hypertension  Solitary pulmonary nodule  Current smoker  Stroke  Left hydrocele  H/O abdominal surgery  History of percutaneous coronary intervention  S/P cataract surgery        ASSESSMENT  73 year old male with past medical history of CAD s/p PCI to LAD and RCA in 3/2024 with residual Cx/OM disease, HTN, HLD, and lung nodule. CT of chest from 8/30/24 showing left lower lobe ground glass nodule increasing in size from prior imaging. Patient is now s/p  PROCEDURES: Robo LVATS, LLL wedge x2, Completion LLLobectomy, MLND 08-Oct-2024  Post op CT maintained for air leak and high output. On 10/10-Pt developed rapid aflutter and bradycardia. Cardiology and EPS consulted. STarted on AMio load 10/11- Tele SR. CT with air leak. 10/11-10/14- Still with air leak. 10/14 pt s/p bedsiode Doxy pleurodesi. EPS following for possible ablation, cannot start Eliquis until CT removed.       PLAN  Neuro: Pain management  Pulm: Encourage coughing, deep breathing and use of incentive spirometry.  Daily CXR.   Cardio: Monitor telemetry/alarms. Continue Amio. Echo done. Labs WNL.   As per DR. Velez, will hold off on a/c for now because of CT output. Will start Eliquis once CT removed. Will need to clarify with EPS ablation plans.  GI: Tolerating diet. Continue stool softeners.  Renal: monitor urine output, supplement electrolytes as needed  Vasc: Heparin SC/SCDs for DVT prophylaxis  Heme: Stable H/H. .   ID: Off antibiotics. Stable.  Therapy: OOB/ambulate  Tubes: Monitor Chest tube output and air leak. keep tube to suction at all times s/p doxy pleurodesis 10/14  Disposition: Aim to D/C to home once CT removed and cleared by Cardiology  Discussed with Cardiothoracic Team at AM rounds.        Subjective: patient seen and examined with thoracic surgery team  bedside staff assisted w translation   pt without acute complaints  admits pain w doxycycline pleurodesis yesterday but pain controlled now  pt denies chest pain or shortness of breath  using incentive spirometer  on bowel regimen, +flatus, +BM  ambulatory with assistance  must be on suction at all times  EPS team following for possible ablation.   d/w attending on morning TEAMS report      Vital Signs:  Vital Signs Last 24 Hrs  T(C): 36.6 (10-15-24 @ 05:16), Max: 36.8 (10-15-24 @ 00:46)  T(F): 97.8 (10-15-24 @ 05:16), Max: 98.3 (10-15-24 @ 00:46)  HR: 68 (10-15-24 @ 05:16) (50 - 68)  BP: 140/60 (10-15-24 @ 05:16) (124/64 - 185/80)  RR: 18 (10-15-24 @ 05:16) (18 - 18)  SpO2: 98% (10-15-24 @ 05:16) (97% - 100%) on (O2)    PE  Telemetry/Alarms: SR  General: awake and alert NAD  Neurology: A&Ox3, nonfocal, ALFREDO x 4  Respiratory: CTA B/L  CV: RRR, S1S2, no murmurs, rubs or gallops  Abdominal: Soft, NT, ND +BS, Last BM  Extremities: No edema, + peripheral pulses  Incisions: c,d,i  Tubes: L chest tube drained 350cc/24h serosanguinous fluid  on suction no air leak  Relevant labs, radiology and Medications reviewed            MEDICATIONS  (STANDING):  acetaminophen   IVPB .. 1000 milliGRAM(s) IV Intermittent once  aMIOdarone    Tablet 400 milliGRAM(s) Oral two times a day  aMIOdarone    Tablet   Oral   aspirin enteric coated 81 milliGRAM(s) Oral daily  ezetimibe 10 milliGRAM(s) Oral daily  heparin   Injectable 5000 Unit(s) SubCutaneous every 12 hours  lidocaine   4% Patch 1 Patch Transdermal <User Schedule>  metoprolol tartrate 25 milliGRAM(s) Oral every 12 hours  polyethylene glycol 3350 17 Gram(s) Oral at bedtime  rosuvastatin 20 milliGRAM(s) Oral at bedtime  senna 2 Tablet(s) Oral at bedtime    MEDICATIONS  (PRN):  acetaminophen     Tablet .. 650 milliGRAM(s) Oral every 6 hours PRN Temp greater or equal to 38C (100.4F), Mild Pain (1 - 3)  nalbuphine Injectable 2.5 milliGRAM(s) IV Push every 6 hours PRN Pruritus  naloxone Injectable 0.1 milliGRAM(s) IV Push every 3 minutes PRN For ANY of the following changes in patient status:  A. RR LESS THAN 10 breaths per minute, B. Oxygen saturation LESS THAN 90%, C. Sedation score of 6  ondansetron Injectable 4 milliGRAM(s) IV Push every 6 hours PRN Nausea  oxyCODONE    IR 2.5 milliGRAM(s) Oral every 4 hours PRN Moderate Pain (4 - 6)  oxyCODONE    IR 5 milliGRAM(s) Oral every 4 hours PRN Severe Pain (7 - 10)    Pertinent Physical Exam  I&O's Summary    13 Oct 2024 07:01  -  14 Oct 2024 07:00  --------------------------------------------------------  IN: 0 mL / OUT: 460 mL / NET: -460 mL    14 Oct 2024 07:01  -  15 Oct 2024 06:57  --------------------------------------------------------  IN: 745 mL / OUT: 850 mL / NET: -105 mL          PAST MEDICAL & SURGICAL HISTORY:  CAD (coronary artery disease)  Hypertension  Solitary pulmonary nodule  Current smoker  Stroke  Left hydrocele  H/O abdominal surgery  History of percutaneous coronary intervention  S/P cataract surgery        ASSESSMENT  73 year old male with past medical history of CAD s/p PCI to LAD and RCA in 3/2024 with residual Cx/OM disease, HTN, HLD, and lung nodule. CT of chest from 8/30/24 showing left lower lobe ground glass nodule increasing in size from prior imaging. Patient is now s/p  PROCEDURES: Robo LVATS, LLL wedge x2, Completion LLLobectomy, MLND 08-Oct-2024  Post op CT maintained for air leak and high output. On 10/10-Pt developed rapid aflutter and bradycardia. Cardiology and EPS consulted. STarted on AMio load 10/11- Tele SR. CT with air leak. 10/11-10/14- Still with air leak. 10/14 pt s/p bedsiode Doxy pleurodesi. EPS following for possible ablation, cannot start Eliquis until CT removed.       PLAN  Neuro: Pain management  Pulm: Encourage coughing, deep breathing and use of incentive spirometry.  Daily CXR.   Cardio: Monitor telemetry/alarms. Continue Amio. Echo done. Labs WNL.   As per DR. Velez, will hold off on a/c for now because of CT output. Will start Eliquis once CT removed. Will need to clarify with EPS ablation plans.  GI: Tolerating diet. Continue stool softeners.  Renal: monitor urine output, supplement electrolytes as needed  Vasc: Heparin SC/SCDs for DVT prophylaxis  Heme: Stable H/H. .   ID: Off antibiotics. Stable.  Therapy: OOB/ambulate  Tubes: Monitor Chest tube output and air leak. keep tube to suction at all times s/p doxy pleurodesis 10/14  Disposition: Aim to D/C to home once CT removed and cleared by Cardiology  Discussed with Cardiothoracic Team at AM rounds.

## 2024-10-15 NOTE — PHYSICAL THERAPY INITIAL EVALUATION ADULT - PERTINENT HX OF CURRENT PROBLEM, REHAB EVAL
73 year old male with past medical history stated below,  now status post Segmentectomy, lung, using VATS

## 2024-10-15 NOTE — DIETITIAN INITIAL EVALUATION ADULT - REASON FOR ADMISSION
Solitary pulmonary nodule  Per chart review, Patient is a 73 year old male with past medical history of CAD s/p PCI to LAD and RCA in 3/2024 with residual Cx/OM disease, HTN, HLD, and lung nodule. CT of chest from 8/30/24 showing left lower lobe ground glass nodule increasing in size from prior imaging. Patient is now s/p  PROCEDURES: Robo LVATS, LLL wedge x2, Completion LLLobectomy, MLND 08-Oct-2024  Post op CT maintained for air leak and high output. On 10/10-Pt developed rapid aflutter and bradycardia. Cardiology and EPS consulted. STarted on AMio load 10/11- Tele SR. CT with air leak. 10/11-10/14- Still with air leak. 10/14 pt s/p bedsiode Doxy pleurodesi. EPS following for possible ablation, cannot start Eliquis until CT removed.

## 2024-10-15 NOTE — DIETITIAN INITIAL EVALUATION ADULT - ORAL INTAKE PTA/DIET HISTORY
Patient is a Mandarin / [place] dialect speaking male, A&OX3 at baseline. Writer speaks fluent Mandarin with patient to conduct this nutrition interview. Patient denies recent weight loss or appetite change PTA. NKFA, likely having lactose intolerance as patient has been avoiding milk products. Confirmed height as 65 inch, and UBW ~ 125 lbs. Patient weighed 150 lbs many years ago.

## 2024-10-16 ENCOUNTER — NON-APPOINTMENT (OUTPATIENT)
Age: 74
End: 2024-10-16

## 2024-10-16 ENCOUNTER — TRANSCRIPTION ENCOUNTER (OUTPATIENT)
Age: 74
End: 2024-10-16

## 2024-10-16 VITALS
TEMPERATURE: 99 F | SYSTOLIC BLOOD PRESSURE: 134 MMHG | HEART RATE: 69 BPM | DIASTOLIC BLOOD PRESSURE: 70 MMHG | RESPIRATION RATE: 18 BRPM | OXYGEN SATURATION: 99 %

## 2024-10-16 PROBLEM — R91.1 SOLITARY PULMONARY NODULE: Chronic | Status: ACTIVE | Noted: 2024-09-24

## 2024-10-16 PROBLEM — I10 ESSENTIAL (PRIMARY) HYPERTENSION: Chronic | Status: ACTIVE | Noted: 2024-09-24

## 2024-10-16 PROBLEM — F17.200 NICOTINE DEPENDENCE, UNSPECIFIED, UNCOMPLICATED: Chronic | Status: ACTIVE | Noted: 2024-09-24

## 2024-10-16 PROBLEM — I63.9 CEREBRAL INFARCTION, UNSPECIFIED: Chronic | Status: ACTIVE | Noted: 2024-09-24

## 2024-10-16 PROBLEM — I25.10 ATHEROSCLEROTIC HEART DISEASE OF NATIVE CORONARY ARTERY WITHOUT ANGINA PECTORIS: Chronic | Status: ACTIVE | Noted: 2024-09-24

## 2024-10-16 PROBLEM — N43.3 HYDROCELE, UNSPECIFIED: Chronic | Status: ACTIVE | Noted: 2024-09-24

## 2024-10-16 LAB
ANION GAP SERPL CALC-SCNC: 8 MMOL/L — SIGNIFICANT CHANGE UP (ref 7–14)
BUN SERPL-MCNC: 21 MG/DL — SIGNIFICANT CHANGE UP (ref 7–23)
CALCIUM SERPL-MCNC: 8.7 MG/DL — SIGNIFICANT CHANGE UP (ref 8.4–10.5)
CHLORIDE SERPL-SCNC: 102 MMOL/L — SIGNIFICANT CHANGE UP (ref 98–107)
CO2 SERPL-SCNC: 24 MMOL/L — SIGNIFICANT CHANGE UP (ref 22–31)
CREAT SERPL-MCNC: 1.02 MG/DL — SIGNIFICANT CHANGE UP (ref 0.5–1.3)
EGFR: 78 ML/MIN/1.73M2 — SIGNIFICANT CHANGE UP
GLUCOSE SERPL-MCNC: 101 MG/DL — HIGH (ref 70–99)
HCT VFR BLD CALC: 34.1 % — LOW (ref 39–50)
HGB BLD-MCNC: 11.6 G/DL — LOW (ref 13–17)
MCHC RBC-ENTMCNC: 29.6 PG — SIGNIFICANT CHANGE UP (ref 27–34)
MCHC RBC-ENTMCNC: 34 GM/DL — SIGNIFICANT CHANGE UP (ref 32–36)
MCV RBC AUTO: 87 FL — SIGNIFICANT CHANGE UP (ref 80–100)
NRBC # BLD: 0 /100 WBCS — SIGNIFICANT CHANGE UP (ref 0–0)
NRBC # FLD: 0 K/UL — SIGNIFICANT CHANGE UP (ref 0–0)
PLATELET # BLD AUTO: 163 K/UL — SIGNIFICANT CHANGE UP (ref 150–400)
POTASSIUM SERPL-MCNC: 4.2 MMOL/L — SIGNIFICANT CHANGE UP (ref 3.5–5.3)
POTASSIUM SERPL-SCNC: 4.2 MMOL/L — SIGNIFICANT CHANGE UP (ref 3.5–5.3)
RBC # BLD: 3.92 M/UL — LOW (ref 4.2–5.8)
RBC # FLD: 13.6 % — SIGNIFICANT CHANGE UP (ref 10.3–14.5)
SODIUM SERPL-SCNC: 134 MMOL/L — LOW (ref 135–145)
SURGICAL PATHOLOGY STUDY: SIGNIFICANT CHANGE UP
WBC # BLD: 6.16 K/UL — SIGNIFICANT CHANGE UP (ref 3.8–10.5)
WBC # FLD AUTO: 6.16 K/UL — SIGNIFICANT CHANGE UP (ref 3.8–10.5)

## 2024-10-16 PROCEDURE — 71045 X-RAY EXAM CHEST 1 VIEW: CPT | Mod: 26

## 2024-10-16 PROCEDURE — 99231 SBSQ HOSP IP/OBS SF/LOW 25: CPT

## 2024-10-16 RX ORDER — HYDROCHLOROTHIAZIDE 50 MG/1
1 TABLET ORAL
Refills: 0 | DISCHARGE

## 2024-10-16 RX ORDER — ASPIRIN 325 MG
1 TABLET ORAL
Refills: 0 | DISCHARGE

## 2024-10-16 RX ORDER — ACETAMINOPHEN 325 MG
2 TABLET ORAL
Qty: 0 | Refills: 0 | DISCHARGE
Start: 2024-10-16

## 2024-10-16 RX ORDER — AMLODIPINE BESYLATE 5 MG
1 TABLET ORAL
Refills: 0 | DISCHARGE

## 2024-10-16 RX ORDER — OXYCODONE HYDROCHLORIDE 30 MG/1
1 TABLET, FILM COATED, EXTENDED RELEASE ORAL
Qty: 30 | Refills: 0
Start: 2024-10-16 | End: 2024-10-20

## 2024-10-16 RX ORDER — METOPROLOL TARTRATE 50 MG
1 TABLET ORAL
Qty: 60 | Refills: 0
Start: 2024-10-16 | End: 2024-11-14

## 2024-10-16 RX ORDER — METOPROLOL TARTRATE 50 MG
1 TABLET ORAL
Refills: 0 | DISCHARGE

## 2024-10-16 RX ORDER — LOSARTAN POTASSIUM 100 MG/1
1 TABLET, FILM COATED ORAL
Refills: 0 | DISCHARGE

## 2024-10-16 RX ORDER — TICAGRELOR 60 MG/1
1 TABLET ORAL
Refills: 0 | DISCHARGE

## 2024-10-16 RX ORDER — APIXABAN 5 MG/1
1 TABLET, FILM COATED ORAL
Qty: 60 | Refills: 0
Start: 2024-10-16 | End: 2024-11-14

## 2024-10-16 RX ORDER — SENNOSIDES 8.6 MG
2 TABLET ORAL
Qty: 0 | Refills: 0 | DISCHARGE
Start: 2024-10-16

## 2024-10-16 RX ORDER — TICAGRELOR 60 MG/1
1 TABLET ORAL
Qty: 60 | Refills: 0
Start: 2024-10-16 | End: 2024-11-14

## 2024-10-16 RX ADMIN — EZETIMIBE 10 MILLIGRAM(S): 10 TABLET ORAL at 12:50

## 2024-10-16 RX ADMIN — Medication 5000 UNIT(S): at 05:41

## 2024-10-16 RX ADMIN — AMIODARONE HYDROCHLORIDE 400 MILLIGRAM(S): 50 INJECTION, SOLUTION INTRAVENOUS at 05:40

## 2024-10-16 RX ADMIN — Medication 81 MILLIGRAM(S): at 12:51

## 2024-10-16 NOTE — PROGRESS NOTE ADULT - SUBJECTIVE AND OBJECTIVE BOX
Interval History:  No acute events overnight  Telemetry: NSR     MEDICATIONS  (STANDING):  acetaminophen   IVPB .. 1000 milliGRAM(s) IV Intermittent once  aMIOdarone    Tablet   Oral   aMIOdarone    Tablet 400 milliGRAM(s) Oral two times a day  aspirin enteric coated 81 milliGRAM(s) Oral daily  ezetimibe 10 milliGRAM(s) Oral daily  heparin   Injectable 5000 Unit(s) SubCutaneous every 12 hours  lidocaine   4% Patch 1 Patch Transdermal <User Schedule>  metoprolol tartrate 25 milliGRAM(s) Oral every 12 hours  polyethylene glycol 3350 17 Gram(s) Oral at bedtime  rosuvastatin 20 milliGRAM(s) Oral at bedtime  senna 2 Tablet(s) Oral at bedtime    MEDICATIONS  (PRN):  acetaminophen     Tablet .. 650 milliGRAM(s) Oral every 6 hours PRN Temp greater or equal to 38C (100.4F), Mild Pain (1 - 3)  nalbuphine Injectable 2.5 milliGRAM(s) IV Push every 6 hours PRN Pruritus  naloxone Injectable 0.1 milliGRAM(s) IV Push every 3 minutes PRN For ANY of the following changes in patient status:  A. RR LESS THAN 10 breaths per minute, B. Oxygen saturation LESS THAN 90%, C. Sedation score of 6  ondansetron Injectable 4 milliGRAM(s) IV Push every 6 hours PRN Nausea  oxyCODONE    IR 2.5 milliGRAM(s) Oral every 4 hours PRN Moderate Pain (4 - 6)  oxyCODONE    IR 5 milliGRAM(s) Oral every 4 hours PRN Severe Pain (7 - 10)    Vital Signs Last 24 Hrs  T(C): 36.5 (10-16-24 @ 08:00), Max: 36.8 (10-15-24 @ 12:21)  T(F): 97.7 (10-16-24 @ 08:00), Max: 98.3 (10-15-24 @ 12:21)  HR: 63 (10-16-24 @ 08:00) (57 - 75)  BP: 124/60 (10-16-24 @ 08:00) (104/58 - 126/62)  BP(mean): --  RR: 16 (10-16-24 @ 08:00) (16 - 18)  SpO2: 99% (10-16-24 @ 08:00) (98% - 100%)    Appearance: Normal	  HEENT:   Normal oral mucosa, PERRL, EOMI	  Lymphatic: No lymphadenopathy  Cardiovascular: Normal S1 S2, No JVD, No murmurs, No edema  Respiratory: Lungs clear to auscultation	  Psychiatry: A & O x 3, Mood & affect appropriate  Gastrointestinal:  Soft, Non-tender, + BS	  Skin: No rashes, No ecchymoses, No cyanosis	  Neurologic: Non-focal  Extremities: Normal range of motion, No clubbing, cyanosis or edema  Vascular: Peripheral pulses palpable 2+ bilaterally    LABS:	 	    CBC Full  -  ( 16 Oct 2024 05:15 )  WBC Count : 6.16 K/uL  Hemoglobin : 11.6 g/dL  Hematocrit : 34.1 %  Platelet Count - Automated : 163 K/uL  Mean Cell Volume : 87.0 fL  Mean Cell Hemoglobin : 29.6 pg  Mean Cell Hemoglobin Concentration : 34.0 gm/dL  Auto Neutrophil # : x  Auto Lymphocyte # : x  Auto Monocyte # : x  Auto Eosinophil # : x  Auto Basophil # : x  Auto Neutrophil % : x  Auto Lymphocyte % : x  Auto Monocyte % : x  Auto Eosinophil % : x  Auto Basophil % : x    10-16    134[L]  |  102  |  21  ----------------------------<  101[H]  4.2   |  24  |  1.02    Ca    8.7      16 Oct 2024 05:15

## 2024-10-16 NOTE — PROGRESS NOTE ADULT - ASSESSMENT
73 year old male with past medical history of CAD s/p PCI to LAD and RCA in 3/2024 with residual Cx/OM disease, HTN, HLD, lung nodule, CT of chest from 8/30/24 showing left lower lobe ground glass nodule increasing in size from prior imaging. Patient is s/p Robotic assisted LVATS, LLL wedge x2, Completion LL Lobectomy, on 08-Oct-2024. EP was called for atrial flutter and sinus bradycardia. Currently telemetry shows sinus rhythm with HR 50s-70s. No aflutter noted at this time. No significant bradycardia also noted. Echocardiogram shows normal LV function with EF 69%. Chest tube still in place as CT shows air leak    - Continue to monitor on telemetry  - Maintain K>4 and MG>2  - Lopressor 25mg PO BID   - Continue Amiodarone 400mg BID x 1 week, 400mg daily x 1 week and then change to 200mg daily  - Follow up TFTs and LFTs Q 3 months, out patient PFTs and annual ophthalmology examination while on Amiodarone  - Start Eliquis 5mg BID when cleared by thoracic surgery, CHADs2 VAsc =2; patient still has chest tube in place  - Continue management as per primary team  - EP will plan for aflutter ablation most likely as outpatient  - Will follow up
73 year old male with past medical history of CAD s/p PCI to LAD and RCA in 3/2024 with residual Cx/OM disease, HTN, HLD, lung nodule, CT of chest from 8/30/24 showing left lower lobe ground glass nodule increasing in size from prior imaging. Patient is s/p Robotic assisted LVATS, LLL wedge x2, Completion LL Lobectomy, on 08-Oct-2024. EP was called for atrial flutter and sinus bradycardia. Currently telemetry shows sinus rhythm with HR 50s-80s and blocked PACs. No aflutter noted at this time. NO significant bradycardia also noted. Echocardiogram shows normal LV function with EF 69%  - Continue to monitor on telemetry  - Maintain K>4 and MG>2  - Continue BB  - Continue Amiodarone 400mg BID x 1 week, 400mg daily x 1 week and then change to 200mg daily  - Follow up TFTs and LFTs Q 3 months, out patient PFTs and annual ophthalmology examination while on Amiodarone  - Start Eliquis 5mg BID when cleared by thoracic surgery, CHADs2 VAsc =2; patient still has chest tube in place  - Continue management as per primary team  - Will follow up
73 year old male with past medical history of CAD s/p PCI to LAD and RCA in 3/2024 with residual Cx/OM disease, HTN, HLD, lung nodule, CT of chest from 8/30/24 showing left lower lobe ground glass nodule increasing in size from prior imaging. Patient is s/p Robotic assisted LVATS, LLL wedge x2, Completion LL Lobectomy, on 08-Oct-2024. EP was called for atrial flutter and sinus bradycardia. Currently telemetry shows sinus rhythm with HR 60s-80s and episodes of atrial flutter with VR up to 140s and few episodes of sinus bradycardia with HR as low as 39 bpm. She is on metoprolol for rate control and started on Amiodarone load. Not on AC. TSH 0.34 uIU/mL. LFTs normal.   - Continue to monitor on telemetry  - Maintain K>4 and MG>2  - Continue BB  - Continue Amiodarone 400mg BID x 1 week, 400mg daily x 1 week and then change to 200mg daily  - Follow up TFTs and LFTs Q 3 months, out patient PFTs and annual ophthalmology examination while on Amiodarone  - Start Eliquis 5mg BID when cleared by thoracic surgery, CHADs2 VAsc =2  - Echocardiogram to evaluate LV function and valve function  - Continue management as per primary team  - Will follow up
73 year old male with past medical history of CAD s/p PCI to LAD and RCA in 3/2024 with residual Cx/OM disease, HTN, HLD, lung nodule, CT of chest from 8/30/24 showing left lower lobe ground glass nodule increasing in size from prior imaging. Patient is s/p Robotic assisted LVATS, LLL wedge x2, Completion LL Lobectomy, on 08-Oct-2024. EP was called for atrial flutter and sinus bradycardia. Currently telemetry shows sinus rhythm with HR 50s-70s. No aflutter noted at this time. No significant bradycardia also noted. Echocardiogram shows normal LV function with EF 69%. Chest tube still in place as CT shows air leak  - Continue to monitor on telemetry  - Maintain K>4 and MG>2  - Continue BB  - Continue Amiodarone 400mg BID x 1 week, 400mg daily x 1 week and then change to 200mg daily  - Follow up TFTs and LFTs Q 3 months, out patient PFTs and annual ophthalmology examination while on Amiodarone  - Start Eliquis 5mg BID when cleared by thoracic surgery, CHADs2 VAsc =2; patient still has chest tube in place  - Continue management as per primary team  - EP will plan for aflutter ablation most likely as outpatient  - Will follow up

## 2024-10-16 NOTE — DISCHARGE NOTE NURSING/CASE MANAGEMENT/SOCIAL WORK - NSDCFUADDAPPT_GEN_ALL_CORE_FT
Follow up with Dr. Velez in 1-2 weeks [phone]   Chest x-ray prior to appointment with Dr. Velez  Follow up with primary care provider in one week     Please follow with Electrophysiology Dr Dave North  on November 13 at 9am  [phone] [place] [address], Floor [address] [place] [address] [phone]

## 2024-10-16 NOTE — PROGRESS NOTE ADULT - PROVIDER SPECIALTY LIST ADULT
CT Surgery
Critical Care
Thoracic Surgery
Anesthesia
CT Surgery
CT Surgery
Electrophysiology
Pain Medicine
Thoracic Surgery
Thoracic Surgery
Electrophysiology
Critical Care

## 2024-10-16 NOTE — DISCHARGE NOTE NURSING/CASE MANAGEMENT/SOCIAL WORK - PATIENT PORTAL LINK FT
You can access the FollowMyHealth Patient Portal offered by [facility] by registering at the following website: [email or web address]. By joining [facility]’s FollowMyHealth portal, you will also be able to view your health information using other applications (apps) compatible with our system.

## 2024-10-16 NOTE — PROGRESS NOTE ADULT - NS ATTEND AMEND GEN_ALL_CORE FT
Continue amiodarone and Eliquis for atrial flutter.
73 year old male with past medical history of CAD s/p PCI to LAD and RCA in 3/2024 with residual Cx/OM disease, HTN, HLD, lung nodule, CT of chest from 8/30/24 showing left lower lobe ground glass nodule increasing in size from prior imaging. Patient is s/p Robotic assisted LVATS, LLL wedge x2, Completion LL Lobectomy, on 08-Oct-2024. EP was called for atrial flutter and sinus bradycardia. Currently telemetry shows sinus rhythm with HR 50s-70s. No aflutter noted at this time. No significant bradycardia also noted. Echocardiogram shows normal LV function with EF 69%. Chest tube still in place as CT shows air leak. Lopressor 25mg PO BID. Continue Amiodarone 400mg BID x 1 week, 400mg daily x 1 week and then change to 200mg daily. Follow up TFTs and LFTs Q 3 months, out patient PFTs and annual ophthalmology examination while on Amiodarone. Start Eliquis 5mg BID when cleared by thoracic surgery, CHADs2 VAsc =2; patient still has chest tube in place. EP will plan for aflutter ablation most likely as outpatient
CHRIS/AFL ablation once able to be on AC. Would perform on this admission.
73 year old male with past medical history of CAD s/p PCI to LAD and RCA in 3/2024 with residual Cx/OM disease, HTN, HLD, lung nodule, CT of chest from 8/30/24 showing left lower lobe ground glass nodule increasing in size from prior imaging. Patient is s/p Robotic assisted LVATS, LLL wedge x2, Completion LL Lobectomy, on 08-Oct-2024. EP was called for atrial flutter and sinus bradycardia. Currently telemetry shows sinus rhythm with HR 50s-80s and blocked PACs. No aflutter noted at this time. NO significant bradycardia also noted. Echocardiogram shows normal LV function with EF 69%. Continue BB. Continue Amiodarone 400mg BID x 1 week, 400mg daily x 1 week and then change to 200mg daily.  Follow up TFTs and LFTs Q 3 months, out patient PFTs and annual ophthalmology examination while on Amiodarone. Start Eliquis 5mg BID when cleared by thoracic surgery, CHADs2 VAsc =2; patient still has chest tube in place.

## 2024-10-16 NOTE — DISCHARGE NOTE NURSING/CASE MANAGEMENT/SOCIAL WORK - NSDPDISTO_GEN_ALL_CORE
What Type Of Note Output Would You Prefer (Optional)?: Standard Output How Severe Is Your Skin Lesion?: mild Has Your Skin Lesion Been Treated?: not been treated Is This A New Presentation, Or A Follow-Up?: [facility]

## 2024-10-16 NOTE — DISCHARGE NOTE NURSING/CASE MANAGEMENT/SOCIAL WORK - NSDCPEFALRISK_GEN_ALL_CORE
For information on Fall & Injury Prevention, visit: [email or web address] OR  [email or web address] OR  https://www.cdc.gov/steadi/patient.html

## 2024-10-16 NOTE — DISCHARGE NOTE NURSING/CASE MANAGEMENT/SOCIAL WORK - FINANCIAL ASSISTANCE
[facility] provides services at a reduced cost to those who are determined to be eligible through [facility]’s financial assistance program. Information regarding [facility]’s financial assistance program can be found by going to [email or web address] or by calling [phone].

## 2024-10-17 RX ORDER — AMIODARONE HYDROCHLORIDE 50 MG/ML
1 INJECTION, SOLUTION INTRAVENOUS
Qty: 2 | Refills: 0
Start: 2024-10-17 | End: 2024-10-17

## 2024-10-18 RX ORDER — AMIODARONE HYDROCHLORIDE 50 MG/ML
1 INJECTION, SOLUTION INTRAVENOUS
Qty: 7 | Refills: 0
Start: 2024-10-18 | End: 2024-10-24

## 2024-10-25 RX ORDER — AMIODARONE HYDROCHLORIDE 50 MG/ML
1 INJECTION, SOLUTION INTRAVENOUS
Qty: 30 | Refills: 0
Start: 2024-10-25 | End: 2024-11-23

## 2024-10-29 ENCOUNTER — APPOINTMENT (OUTPATIENT)
Dept: CARDIOLOGY | Facility: CLINIC | Age: 74
End: 2024-10-29

## 2024-10-31 ENCOUNTER — APPOINTMENT (OUTPATIENT)
Dept: THORACIC SURGERY | Facility: CLINIC | Age: 74
End: 2024-10-31

## 2024-10-31 ENCOUNTER — INPATIENT (INPATIENT)
Facility: HOSPITAL | Age: 74
LOS: 18 days | Discharge: HOME | DRG: 66 | End: 2024-11-19
Attending: PSYCHIATRY & NEUROLOGY | Admitting: PSYCHIATRY & NEUROLOGY
Payer: MEDICARE

## 2024-10-31 VITALS
OXYGEN SATURATION: 97 % | HEIGHT: 62 IN | SYSTOLIC BLOOD PRESSURE: 126 MMHG | HEART RATE: 62 BPM | WEIGHT: 132.5 LBS | RESPIRATION RATE: 16 BRPM | DIASTOLIC BLOOD PRESSURE: 75 MMHG | TEMPERATURE: 99 F

## 2024-10-31 DIAGNOSIS — I63.9 CEREBRAL INFARCTION, UNSPECIFIED: ICD-10-CM

## 2024-10-31 DIAGNOSIS — Z90.2 ACQUIRED ABSENCE OF LUNG [PART OF]: Chronic | ICD-10-CM

## 2024-10-31 DIAGNOSIS — Z98.890 OTHER SPECIFIED POSTPROCEDURAL STATES: Chronic | ICD-10-CM

## 2024-10-31 DIAGNOSIS — Z98.49 CATARACT EXTRACTION STATUS, UNSPECIFIED EYE: Chronic | ICD-10-CM

## 2024-10-31 DIAGNOSIS — Z98.61 CORONARY ANGIOPLASTY STATUS: Chronic | ICD-10-CM

## 2024-10-31 LAB — SARS-COV-2 RNA SPEC QL NAA+PROBE: SIGNIFICANT CHANGE UP

## 2024-10-31 PROCEDURE — 99223 1ST HOSP IP/OBS HIGH 75: CPT

## 2024-10-31 RX ORDER — POLYETHYLENE GLYCOL 3350 17 G/17G
17 POWDER, FOR SOLUTION ORAL AT BEDTIME
Refills: 0 | Status: DISCONTINUED | OUTPATIENT
Start: 2024-10-31 | End: 2024-11-19

## 2024-10-31 RX ORDER — METOPROLOL TARTRATE 100 MG/1
25 TABLET, FILM COATED ORAL EVERY 12 HOURS
Refills: 0 | Status: DISCONTINUED | OUTPATIENT
Start: 2024-10-31 | End: 2024-11-01

## 2024-10-31 RX ORDER — ACETAMINOPHEN 500MG 500 MG/1
650 TABLET, COATED ORAL EVERY 6 HOURS
Refills: 0 | Status: DISCONTINUED | OUTPATIENT
Start: 2024-10-31 | End: 2024-11-16

## 2024-10-31 RX ORDER — SENNOSIDES 8.6 MG
2 TABLET ORAL AT BEDTIME
Refills: 0 | Status: DISCONTINUED | OUTPATIENT
Start: 2024-10-31 | End: 2024-11-19

## 2024-10-31 RX ORDER — AMIODARONE HYDROCHLORIDE 200 MG/1
200 TABLET ORAL DAILY
Refills: 0 | Status: DISCONTINUED | OUTPATIENT
Start: 2024-11-01 | End: 2024-11-07

## 2024-10-31 RX ORDER — APIXABAN 2.5 MG/1
5 TABLET, FILM COATED ORAL
Refills: 0 | Status: DISCONTINUED | OUTPATIENT
Start: 2024-10-31 | End: 2024-11-19

## 2024-10-31 RX ORDER — ROSUVASTATIN CALCIUM 5 MG/1
20 TABLET, FILM COATED ORAL AT BEDTIME
Refills: 0 | Status: DISCONTINUED | OUTPATIENT
Start: 2024-10-31 | End: 2024-11-19

## 2024-10-31 RX ORDER — METOPROLOL TARTRATE 100 MG/1
25 TABLET, FILM COATED ORAL EVERY 12 HOURS
Refills: 0 | Status: DISCONTINUED | OUTPATIENT
Start: 2024-10-31 | End: 2024-10-31

## 2024-10-31 RX ORDER — ACETAMINOPHEN, DIPHENHYDRAMINE HCL, PHENYLEPHRINE HCL 325; 25; 5 MG/1; MG/1; MG/1
6 TABLET ORAL AT BEDTIME
Refills: 0 | Status: DISCONTINUED | OUTPATIENT
Start: 2024-10-31 | End: 2024-11-08

## 2024-10-31 RX ADMIN — ACETAMINOPHEN, DIPHENHYDRAMINE HCL, PHENYLEPHRINE HCL 6 MILLIGRAM(S): 325; 25; 5 TABLET ORAL at 21:05

## 2024-10-31 RX ADMIN — APIXABAN 5 MILLIGRAM(S): 2.5 TABLET, FILM COATED ORAL at 18:38

## 2024-10-31 RX ADMIN — METOPROLOL TARTRATE 25 MILLIGRAM(S): 100 TABLET, FILM COATED ORAL at 18:38

## 2024-10-31 RX ADMIN — Medication 2 TABLET(S): at 21:05

## 2024-10-31 RX ADMIN — ROSUVASTATIN CALCIUM 20 MILLIGRAM(S): 5 TABLET, FILM COATED ORAL at 21:05

## 2024-10-31 NOTE — H&P ADULT - HISTORY OF PRESENT ILLNESS
73 year old right handed [facility]-speaking man with HTN, atrial fibrillation on Eliquis and Amiodarone, on Brilinta, former smoker x 34 years (quit 22months ago) discharged from  [facility] on 10/16 for left pulmonary nodule s/p Left lung lobectomy s/p chest tube presented to [facility] ED (10/17/24) with left sided weakness and left gaze preference. Daughter states patient last known normal at 11 am the last time she saw him well before she left to go to the store. She returned home at 1 pm and noted patient with left sided weakness and called EMS. Daughter confirmed she gave patient his Eliquis this morning. At baseline patient walks without assistive devices. On initial stroke evaluation, NIHSS-18 for patient awake with minimal verbal output right gaze preference not able to cross midline, left facial droop and LUE 0/5. LLE 1/5. CT with no ICH but noted dense right MCA. CTA head/neck with right ICA occlusion after the bifurcation extending to the supraclinoid ICA and right MCA, noted E/ICAD including L-ICA cavernous/supraclinoid segment mutlifocal stenosis, left VA mild to moderate stenosis. Patient not a candidate for TNK as on Eliquis and compliant. Patient underwent thrombectomy TICI 3. Patient admitted to MICU for close observation, required nicardipene drip. Repeat CTH with evolving Right MCA stroke without hemorrhage. Started ASA. Patient downgraded to Stroke Unit (10/19). A 1 c 6.1 (pre-OM), LDL 43 (wnl), TG 69 (wnl). TTE with mildly enlarged left atrium, mild concentric LVH, LV hyperdynamic EF >70%. [facility] CT surgery RAGINI Chacko from Dr. Mark Velez office [phone] reported that patient was on Eliquis and Brilinta prior to admission, admitted to [facility] 10/8 for left lower wedge x2 / lobectomy which required chest tube placement and was removed 10/16 and was told to hold blood thinners 3-7 days prior to admission. On discharge from [facility], EP was consulted as patient was in afib and recommend Amio load and resume Eliquis/Brilinta 10/16. Pathology resulted with adenocarcinoma but pending LN results for staging. No other HemOnc testing has been done. MRI brain with R-MCA territory infarct. Etiology of stroke cardio embolic due to Afib off AC due to recent surgery (10/2-10/16) vs hypercoagulability due to malignancy.

## 2024-10-31 NOTE — H&P ADULT - NSICDXPASTMEDICALHX_GEN_ALL_CORE_FT
PAST MEDICAL HISTORY:  Adenocarcinoma, lung     Afib     CAD (coronary artery disease)     Current smoker     Hypertension     Left hydrocele     Solitary pulmonary nodule     Stroke

## 2024-10-31 NOTE — H&P ADULT - NSHPREVIEWOFSYSTEMS_GEN_ALL_CORE
REVIEW OF SYSTEMS  Constitutional: No fever, No Chills, No fatigue  HEENT: No eye pain, + blurred vision (chronic) No difficulty hearing  Pulm: No cough,  No shortness of breath  Cardio: No chest pain, No palpitations  GI:  No abdominal pain, No nausea, No vomiting, No diarrhea, No constipation LBM 10/29  : No dysuria, No frequency, No hematuria  Neuro: No headaches, No memory loss, +weakness left side, + numbness L hand, No tremors  Skin: No itching, No rashes, No lesions   Endo: No temperature intolerance  MSK: No joint pain, No joint swelling, No muscle pain, No Neck pain,  No back pain  Psych:  No depression, No anxiety

## 2024-10-31 NOTE — H&P ADULT - ASSESSMENT
72 yo right handed zhou-speaking M with PMHX HTN, atrial fibrillation, former smoker x 34 years presented to [facility] on 10/17 with right ICA occlusion and right MCA infarct s/p thrombectomy TICI 3. Discharged from [facility] day before CVA for left pulmonary nodule s/p Left lung lobectomy s/p chest tube. Had been off blood thinners (10/2-10/16)  prior to surgery, then noted to by in AFIB on day of [facility] discharge so was loaded with amiodarone and restarted on Eliquis and Brilinta. Etiology of stroke cardio embolic due to Afib off AC due to recent surgery (10/2-10/16) vs hypercoagulability due to malignancy. Lung path showing adenocarcinoma, no hemo/onc workup started.  Admitted for multidisciplinary rehab program      #R MCA infarct s/p  thrombectomy TICI 3  - right ICA occlusion after the bifurcation extending to the supraclinoid ICA and right MCA, noted E/ICAD including L-ICA cavernous/supraclinoid segment mutlifocal stenosis, left VA mild to moderate stenosis. s/p thrombectomy 10/17  - Etiology of stroke cardio embolic due to Afib off AC due to recent surgery (10/2-10/16) vs hypercoagulability due to malignancy  - CTH 10/31 Stable, no hemorrhage  - Continue Crestor 20 mg nightly   - Continue ASA 81 mg daily  - Eliquis??? Brilinta??? was going to start today if CT was stable, which it was.   - Fall and aspiration precuations  - Comprehensive Multidisciplinary Rehab Program: Gait, ADL, Functional impairments PT/OT/ SLP 3 hours a day 5 days a week    #PAF  - Continue Amiodarone 200 mg daily  - Continue Metoprolol 25 mg BID  - ELIQUIS????  - EKG on admission     #HTN  - Continue home metoprolol 25 mg BID    #Lung Adenocarcinoma s/p VATS lobectomy on 10/8  #Small loculated L pleural effusion post-op  - Follow up with [facility] heme/onc already established  - No treatment while in patient    #Pancreatic cyst, incidental finding on CT CAP  - Follow up with PCP for Pancreatic protocol imaging every 2 years for 10 years.     #Mood / Cognition:  - Neuropsych evaluation     #Sleep:  - Maintain quiet hours and low stim environment  - Melatonin PRN    #Pain:  - Tylenol PRN  - avoid sedating meds that may affect cognitive recovery    #GI/Bowel:  - Senna 2 tabs daily  - Miralax prn  - GI ppx: None    #/Bladder:  - Monitor PVR if no void in 8h; SC for >400 cc  - Toileting schedule q4h    #Diet / Dysphagia:    - Diet: Minced and Moist, thin liquids  - ongoing SLP assessment  - Nutrition to follow    #Skin/ Pressure Injury Prevention:  - assessment on admission   - Incisions: [id number]  - Turn Q2hrs in bed while awake, OOB to Chair, PT/OT/SLP     #DVT prophylaxis:  - LOVENOX if not placed back on Eliquis   - SCDs  - Last doppler on: none    #Precautions/ Restrictions  - Falls,   - Lungs: Aspiration, Incentive Spirometer    - COVID PCR: Pending    --------------------------------------------  Outpatient Follow up:    Cardiology    Neurology    Heme/Onc    --------------------------------------------      MEDICAL PROGNOSIS: GOOD            REHAB POTENTIAL: GOOD             ESTIMATED DISPOSITION: HOME WITH HOME CARE            ELOS: 10-14 Days   EXPECTED THERAPY:     P.T. 1hr/day       O.T. 1hr/day      S.L.P. 1hr/day     P&O Unnecessary     EXP FREQUENCY: 5 days per 7 day period     PRESCREEN COMPARISON:   I have reviewed the prescreen information and I have found no relevant changes between the preadmission screening and my post admission evaluation     RATIONALE FOR INPATIENT ADMISSION - Patient demonstrates the following: (check all that apply)  [X] Medically appropriate for rehabilitation admission  [X] Has attainable rehab goals with an appropriate initial discharge plan  [X] Has rehabilitation potential (expected to make a significant improvement within a reasonable period of time)   [X] Requires close medical management by a rehab physician, rehab nursing care, Hospitalist and comprehensive interdisciplinary team (including PT, OT, & or SLP, Prosthetics and Orthotics)   74 yo right handed zhou-speaking M with PMHX HTN, atrial fibrillation, former smoker x 34 years presented to [facility] on 10/17 with right ICA occlusion and right MCA infarct s/p thrombectomy TICI 3. Discharged from [facility] day before CVA for left pulmonary nodule s/p Left lung lobectomy s/p chest tube. Had been off blood thinners (10/2-10/16)  prior to surgery, then noted to by in AFIB on day of [facility] discharge so was loaded with amiodarone and restarted on Eliquis and Brilinta. Etiology of stroke cardio embolic due to Afib off AC due to recent surgery (10/2-10/16) vs hypercoagulability due to malignancy. Lung path showing adenocarcinoma, no hemo/onc workup started.  Admitted for multidisciplinary rehab program on 10/31.      #R MCA infarct s/p  thrombectomy TICI 3  - right ICA occlusion after the bifurcation extending to the supraclinoid ICA and right MCA, noted E/ICAD including L-ICA cavernous/supraclinoid segment mutlifocal stenosis, left VA mild to moderate stenosis. s/p thrombectomy 10/17  - Etiology of stroke cardio embolic due to Afib off AC due to recent surgery (10/2-10/16) vs hypercoagulability due to malignancy  - [facility] 10/31 Stable, no hemorrhage  - Continue Crestor 20 mg nightly   - Continue ASA 81 mg daily  - Continue Elliquis 5mg BID  - Fall and aspiration precuations  - Comprehensive Multidisciplinary Rehab Program: Gait, ADL, Functional impairments PT/OT/ SLP 3 hours a day 5 days a week    #PAF  - Continue Amiodarone 200 mg daily  - Continue Metoprolol 25 mg BID  - ELIQUIS 5mg BID  - EKG on admission     #HTN/HLD  - Continue home metoprolol 25 mg BID  - Continue Crestor 20mg HS    #Lung Adenocarcinoma s/p VATS lobectomy on 10/8  #Small loculated L pleural effusion post-op  - Follow up with [facility] heme/onc already established  - No treatment while in patient    #Pancreatic cyst, incidental finding on CT CAP  - Follow up with PCP for Pancreatic protocol imaging every 2 years for 10 years.     #Mood / Cognition:  - Neuropsych evaluation     #Sleep:  - Maintain quiet hours and low stim environment  - Melatonin 6mg PRN    #Pain:  - Tylenol PRN  - avoid sedating meds that may affect cognitive recovery    #GI/Bowel:  - Senna 2 tabs daily  - Miralax prn  - GI ppx: None    #/Bladder:  - Monitor PVR if no void in 8h; SC for >400 cc  - Toileting schedule q4h    #Diet / Dysphagia:    - Diet: Minced and Moist, thin liquids  - ongoing SLP assessment  - Nutrition to follow    #Skin/ Pressure Injury Prevention:  - assessment on admission: Mid abd scar, L upper flank incision sites x 4 , dry skin generalized, R foot lateral callus   - Turn Q2hrs in bed while awake, OOB to Chair, PT/OT/SLP     #DVT prophylaxis:  - LOVENOX if not placed back on Eliquis   - SCDs  - Last doppler on: none    #Precautions/ Restrictions  - Falls,   - Lungs: Aspiration, Incentive Spirometer      --------------------------------------------  Outpatient Follow up:    Cardiology    Neurology    Heme/Onc    --------------------------------------------      MEDICAL PROGNOSIS: GOOD            REHAB POTENTIAL: GOOD             ESTIMATED DISPOSITION: HOME WITH HOME CARE            ELOS: 10-14 Days   EXPECTED THERAPY:     P.T. 1hr/day       O.T. 1hr/day      S.L.P. 1hr/day     P&O Unnecessary     EXP FREQUENCY: 5 days per 7 day period     PRESCREEN COMPARISON:   I have reviewed the prescreen information and I have found no relevant changes between the preadmission screening and my post admission evaluation     RATIONALE FOR INPATIENT ADMISSION - Patient demonstrates the following: (check all that apply)  [X] Medically appropriate for rehabilitation admission  [X] Has attainable rehab goals with an appropriate initial discharge plan  [X] Has rehabilitation potential (expected to make a significant improvement within a reasonable period of time)   [X] Requires close medical management by a rehab physician, rehab nursing care, Hospitalist and comprehensive interdisciplinary team (including PT, OT, & or SLP, Prosthetics and Orthotics)   74 yo right handed zhou-speaking M with PMHX HTN, atrial fibrillation, former smoker x 34 years presented to [facility] on 10/17 with right ICA occlusion and right MCA infarct s/p thrombectomy TICI 3. Discharged from [facility] day before CVA for left pulmonary nodule s/p Left lung lobectomy s/p chest tube. Had been off blood thinners (10/2-10/16)  prior to surgery, then noted to by in AFIB on day of [facility] discharge so was loaded with amiodarone and restarted on Eliquis and Brilinta. Etiology of stroke cardio embolic due to Afib off AC due to recent surgery (10/2-10/16) vs hypercoagulability due to malignancy. Lung path showing adenocarcinoma, no hemo/onc workup started.  Admitted for multidisciplinary rehab program on 10/31.      #R MCA infarct s/p  thrombectomy TICI 3  - right ICA occlusion after the bifurcation extending to the supraclinoid ICA and right MCA, noted E/ICAD including L-ICA cavernous/supraclinoid segment mutlifocal stenosis, left VA mild to moderate stenosis. s/p thrombectomy 10/17  - Etiology of stroke cardio embolic due to Afib off AC due to recent surgery (10/2-10/16) vs hypercoagulability due to malignancy  - [facility] 10/31 Stable, no hemorrhage  - Continue Crestor 20 mg nightly   - Continue ASA 81 mg daily  - Continue Elliquis 5mg BID  - Fall and aspiration precuations  - Comprehensive Multidisciplinary Rehab Program: Gait, ADL, Functional impairments PT/OT/ SLP 3 hours a day 5 days a week    #PAF  - Continue Amiodarone 200 mg daily  - Continue Metoprolol 25 mg BID  - ELIQUIS 5mg BID  - EKG on admission     #HTN/HLD  - Continue home metoprolol 25 mg BID  - Continue Crestor 20mg HS    #Lung Adenocarcinoma s/p VATS lobectomy on 10/8  #Small loculated L pleural effusion post-op  - Follow up with [facility] heme/onc already established  - No treatment while in patient    #Pancreatic cyst, incidental finding on CT CAP  - Follow up with PCP for Pancreatic protocol imaging every 2 years for 10 years.     #Mood / Cognition:  - Neuropsych evaluation     #Sleep:  - Maintain quiet hours and low stim environment  - Melatonin 6mg PRN    #Pain:  - Tylenol PRN  - avoid sedating meds that may affect cognitive recovery    #GI/Bowel:  - Senna 2 tabs daily  - Miralax prn  - GI ppx: None    #/Bladder:  - Monitor PVR if no void in 8h; SC for >400 cc  - Toileting schedule q4h    #Diet / Dysphagia:    - Diet: Minced and Moist, thin liquids  - ongoing SLP assessment  - Nutrition to follow    #Skin/ Pressure Injury Prevention:  - assessment on admission: Mid abd scar, L upper flank incision sites x 4 , dry skin generalized, R foot lateral callus   - Turn Q2hrs in bed while awake, OOB to Chair, PT/OT/SLP     #DVT prophylaxis:  -Eliquis 5mg BID  - SCDs  - Last doppler on: none    #Precautions/ Restrictions  - Falls,   - Lungs: Aspiration, Incentive Spirometer      --------------------------------------------  Outpatient Follow up:    Vitaly Dias MD  Internal medicine  [id number] [place] [address] [id number] [phone]    Keke Lofton MD  [facility]  [address] [place] [address]  [phone]  --------------------------------------------      MEDICAL PROGNOSIS: GOOD            REHAB POTENTIAL: GOOD             ESTIMATED DISPOSITION: HOME WITH HOME CARE            ELOS: 10-14 Days   EXPECTED THERAPY:     P.T. 1hr/day       O.T. 1hr/day      S.L.P. 1hr/day     P&O Unnecessary     EXP FREQUENCY: 5 days per 7 day period     PRESCREEN COMPARISON:   I have reviewed the prescreen information and I have found no relevant changes between the preadmission screening and my post admission evaluation     RATIONALE FOR INPATIENT ADMISSION - Patient demonstrates the following: (check all that apply)  [X] Medically appropriate for rehabilitation admission  [X] Has attainable rehab goals with an appropriate initial discharge plan  [X] Has rehabilitation potential (expected to make a significant improvement within a reasonable period of time)   [X] Requires close medical management by a rehab physician, rehab nursing care, Hospitalist and comprehensive interdisciplinary team (including PT, OT, & or SLP, Prosthetics and Orthotics)

## 2024-10-31 NOTE — H&P ADULT - NSICDXPASTSURGICALHX_GEN_ALL_CORE_FT
PAST SURGICAL HISTORY:  H/O abdominal surgery     History of percutaneous coronary intervention     S/P cataract surgery     S/P lobectomy of lung

## 2024-10-31 NOTE — PATIENT PROFILE ADULT - FALL HARM RISK - HARM RISK INTERVENTIONS
Assistance with ambulation/Assistance OOB with selected safe patient handling equipment/Communicate Risk of Fall with Harm to all staff/Discuss with provider need for PT consult/Monitor gait and stability/Reinforce activity limits and safety measures with patient and family/Tailored Fall Risk Interventions/Visual Cue: Yellow wristband and red socks/Bed in lowest position, wheels locked, appropriate side rails in place/Call bell, personal items and telephone in reach/Instruct patient to call for assistance before getting out of bed or chair/Non-slip footwear when patient is out of bed/[place] to call system/Physically safe environment - no spills, clutter or unnecessary equipment/Purposeful Proactive Rounding/Room/bathroom lighting operational, light cord in reach

## 2024-10-31 NOTE — H&P ADULT - ATTENDING COMMENTS
74 yo boo with multiple vascular risk factor- AFIB, hypercoagulable state / lung cancer- off AC for lung surgery, acute Right MCA infarct, s/p thrombectomy with left hemiparesis and gait impairment  Multiple functional impairments  Admit to acute neurorehabilitation program  Admit labs  Resume all medications  Fall, safety and aspiration precautions   Medicine input

## 2024-10-31 NOTE — H&P ADULT - NSHPLANGPREFER_GEN_A_CORE
80 y/o female with Hx of lung cancer s/p radiation, CAD s/p CABG x 3, HTN, HLD, carotid stenosis s/p left endarterectomy and anxiety, she has longstanding right knee pain, over the past 6 months pain has been progressively worsening, she has tried conservative treatments including PT, gel and cortisone injections, last injection about 6 months ago with no relieve she came in here for elective right SABA total knee replacement with Dr. Díaz on 3/13/24 s/p procedure.       Plan:     OA of the Right knee s/p TKR post op day 0:     - post op no complications  - VS stable  - abx per ortho   - c/w anti hypertensive to be restarted post op day 02 except if blood pressure goes >150 systolic   - c/w IVF x 24 hrs then reassess per ortho  - opiate induced constipation regimen   - encouraging incentive spirometry   -c/w local wound care per ortho   -DVT prophylaxis and Pain meds as per Ortho team   -PT/OT and weight bearing per ortho   -cyclobenzaprine 10 mg once a day    CAD: will continue with aspirin 81 mg once a day will resume Plavix 75 mg once a day once ok from ortho team     HTN: will continue with metoprolol succinate 100 mg once a day, amLODIPine 5 mg once a day and lisinopril 40 mg once a day    Anxiety: will continue with ALPRAZolam 1 mg 2 times a day, As Needed    Insomnia: Will continue with zolpidem 10 mg once a day (at bedtime)    Hx of HLD: will continue with atorvastatin 40 mg once a day    Given hx of Ca lungs patient is at high risk for DVT, would recommend Lovenox sc for DVT prophylaxis.     
Mandarin

## 2024-10-31 NOTE — H&P ADULT - NSHPLABSRESULTS_GEN_ALL_CORE
Test	Date/Result: All results are from 10/31/24  WBC	5.36  HgB	12.4  Hct	37.4  Platelets	235    Labs/Chemistry  Test	Date/Result: All results are from 10/31/24  Glucose	166  Sodium	138  Potassium	4.3  Blood Urea Nitrogen	18.3  Creatinine	0.93    Radiology    CTA head/neck	10/17/24  Extracranial and intracranial atherosclerosis.   Right ICA occlusion shortly after the bifurcation extending to the supraclinoid ICA and right MCA. Minimal contrast opacification right M2 branches, presumed via collateral.   Left ICA cavernous and supraclinoid segment multifocal up to mild to moderate stenoses.   Calcific plaque intracranial portion left vertebral artery causing   focal mild to moderate stenosis.   Small left pneumothorax. Patient has history of recent VATS procedure left thorax.     CTH	  10/17/24  Dense right MCA is present. The gray-white matter junction is maintained. There is no acute intracranial hemorrhage.     10/18/24  Evolving ischemic changes right cerebral hemisphere MCA distribution with slight mass effect. No hemorrhage    10/22/24  Evolving large right MCA distribution infarct with slight interval increase in mass effect, now with 3 mm left martin shift. No hemorrhage    10/31/24  Mild residual encephalomalacia of anterior right temporal lobe as well as right internal and external capsule, reflecting sequela of known right MCA infarct status post treatment    CXR	10/19/24 : No pneumothorax    MRI brain	10/20/24: Right MCA territorial infarct s/I treatment. No evidence for hemorrhage.    CT CAP	10/20/24: Small to moderate loculated left pleural effusion with small pneumothorax status post left lower lobe partial pneumonectomy. Splenic infarction. Pancreatic body 0.7 cm cystic lesion.

## 2024-10-31 NOTE — H&P ADULT - NSHPPHYSICALEXAM_GEN_ALL_CORE
PHYSICAL EXAM  VITALS  T(C): 37 (10-31-24 @ 17:08), Max: 37 (10-31-24 @ 17:08)  HR: 62 (10-31-24 @ 17:08) (62 - 62)  BP: 126/75 (10-31-24 @ 17:08) (126/75 - 126/75)  RR: 16 (10-31-24 @ 17:08) (16 - 16)  SpO2: 97% (10-31-24 @ 17:08) (97% - 97%)    Gen - NAD, Comfortable  HEENT - NCAT, EOMI, MMM, PERRLA, Normal Conjunctivae  Neck - Supple, No limited ROM  Pulm - Diminished lung sounds, No wheeze, No rhonchi, No crackles  Cardiovascular - RRR, S1S2, No murmurs  Chest - good chest expansion, good respiratory effort  Abdomen - Soft, NT/ND, +BS  Extremities - No C/C/E, no calf tenderness  Neuro-     Cognitive - awake, alert, oriented to person, place, date, year, and situation.  Able  to follow command     Communication - Fluent, Comprehensible, + dysarthria      Attention: Intact, able to state days of week chronologically and backwards. Able to perform simple additions and subtractions     Memory: Recall 3 objects immediate and 3 min later     Cranial Nerves -L facial asymmetry, Tongue midline, EOMI, Shoulder shrug intact     Motor - Left hemiparesis                    LEFT    UE - ShAB 3/5, EF 3/5, EE 3/5,  0/5                    RIGHT UE - ShAB 5/5, EF 5/5, EE 5/5,   5/5                    LEFT    LE - HF 4/5, KE 5/5, DF 5/5, PF 5/5                    RIGHT LE - HF 5/5, KE 5/5, DF 5/5, PF 5/5        Sensory - Intact to LT      Coordination - FTN impaired LUE     Tone - normal, increased to ..........  Psychiatric - Mood stable, Affect WNL  Skin:  Mid abd scar, L upper flank incision sites x 4 , dry skin generalized, R foot lateral callus PHYSICAL EXAM  VITALS  T(C): 37 (10-31-24 @ 17:08), Max: 37 (10-31-24 @ 17:08)  HR: 62 (10-31-24 @ 17:08) (62 - 62)  BP: 126/75 (10-31-24 @ 17:08) (126/75 - 126/75)  RR: 16 (10-31-24 @ 17:08) (16 - 16)  SpO2: 97% (10-31-24 @ 17:08) (97% - 97%)    Gen - NAD, Comfortable  HEENT - NCAT, EOMI, MMM, PERRLA, Normal Conjunctivae  Neck - Supple, No limited ROM  Pulm - Diminished lung sounds, No wheeze, No rhonchi, No crackles  Cardiovascular - RRR, S1S2, No murmurs  Chest - good chest expansion, good respiratory effort  Abdomen - Soft, NT/ND, +BS  Extremities - No C/C/E, no calf tenderness  Neuro-     Cognitive - awake, alert, oriented to person, place, date, year, and situation.  Able  to follow command     Communication - Fluent, Comprehensible, + dysarthria      Attention: Intact, able to state days of week chronologically and backwards. Able to perform simple additions and subtractions     Memory: Recall 3 objects immediate and 3 min later     Cranial Nerves -L facial asymmetry, Tongue midline, EOMI, Shoulder shrug intact     Motor - Left hemiparesis                    LEFT    UE - ShAB 3/5, EF 3/5, EE 3/5,  0/5                    RIGHT UE - ShAB 5/5, EF 5/5, EE 5/5,   5/5                    LEFT    LE - HF 4/5, KE 5/5, DF 5/5, PF 5/5                    RIGHT LE - HF 5/5, KE 5/5, DF 5/5, PF 5/5        Sensory - Intact to LT      Coordination - FTN impaired LUE     Tone - Increased tone LUE  Psychiatric - Mood stable, Affect WNL  Skin:  Mid abd scar, L upper flank incision sites x 4 , dry skin generalized, R foot lateral callus PHYSICAL EXAM  VITALS  T(C): 37 (10-31-24 @ 17:08), Max: 37 (10-31-24 @ 17:08)  HR: 62 (10-31-24 @ 17:08) (62 - 62)  BP: 126/75 (10-31-24 @ 17:08) (126/75 - 126/75)  RR: 16 (10-31-24 @ 17:08) (16 - 16)  SpO2: 97% (10-31-24 @ 17:08) (97% - 97%)    Gen - NAD, Comfortable  HEENT - NCAT, EOMI, MMM, PERRLA, Normal Conjunctivae  Neck - Supple, No limited ROM  Pulm - Diminished lung sounds, No wheeze, No rhonchi, No crackles  Cardiovascular - RRR, S1S2, No murmurs  Chest - good chest expansion, good respiratory effort  Abdomen - Soft, NT/ND, +BS  Extremities - No C/C/E, no calf tenderness  Neuro-     Cognitive - awake, alert, oriented to person, place, date, year, and situation.  Able  to follow commands     Communication - Fluent, Comprehensible, + dysarthria      Attention: Intact, able to state days of week chronologically and backwards. Able to perform simple additions and subtractions     Memory: Recall 3 objects immediate and 1 min later     Cranial Nerves - no VF cut, +LR=, EOMU, L facial weakness, soft palate elevate bilaterally,  tongue midline     Motor - Left hemiparesis                    LEFT    UE - ShAB 3/5, EF 3/5, EE 3/5,  0/5                    RIGHT UE - ShAB 5/5, EF 5/5, EE 5/5,   5/5                    LEFT    LE - HF 4/5, KE 5/5, DF 5/5, PF 5/5                    RIGHT LE - HF 5/5, KE 5/5, DF 5/5, PF 5/5        Sensory - Intact to LT      Coordination - FTN impaired LUE due to weakness      Tone - Increased tone LUE  Psychiatric - Mood stable, Affect WNL  Skin:  Mid abd scar, L upper flank incision sites x 4 , dry skin generalized, R foot lateral callus

## 2024-10-31 NOTE — H&P ADULT - NSHPSOCIALHISTORY_GEN_ALL_CORE
Independent of all ADLS/IADLS prior to admission    Swallowing Recommendations:   Diet Textures:   - Minced & moist foods (IDDSI Level 5)  - Thin liquids {IDDSI Level 0) via small sip with bolus hold    10/29/24 PT note:  Bed Mobility   Scoots with minimum assistance.   Sit to supine with minimum assistance.     Transfers   Sit to stand transfer with minimum assistance to/from the chair using a rolling walker   Stand to sit transfer with contact guard minimum assistance to/from the bed using a rolling walker     Ambulation   Ambulated 35 ft x 2 with minimum assistance using a rolling walker. With verbal cues to center rolling walker as Pt veers to left side.   Gait: step-to gait pattern, decreased gait speed, decreased step length, and narrow YENI     10/27/24 OT note:  •    Bed mobility: supine<>sit with min assistance, scoots with min assist  •    LE Dressing: dons/doffs bilateral socks seated at edge of bed with MOD assist using iron-dressing technique  •    Functional reaching to place/remove clothespins along sheet using right UE in all planes of motion sitting edge of bed; min assist provided for dynamic sitting balance, 3x3 reps  o    Emphasis on weightbearing onto affected left UE to promote proprioceptive input  •    Functional reaching with left UE to targets placed along left visual field, CG assist provided for dynamic sitting balance with right UE support on bed, 3x3 reps  o    Emphasis on visual scanning and awareness to left side of environment

## 2024-11-01 LAB
ALBUMIN SERPL ELPH-MCNC: 2.7 G/DL — LOW (ref 3.3–5)
ALP SERPL-CCNC: 81 U/L — SIGNIFICANT CHANGE UP (ref 40–120)
ALT FLD-CCNC: 122 U/L — HIGH (ref 10–45)
ANION GAP SERPL CALC-SCNC: 5 MMOL/L — SIGNIFICANT CHANGE UP (ref 5–17)
AST SERPL-CCNC: 62 U/L — HIGH (ref 10–40)
BILIRUB SERPL-MCNC: 0.5 MG/DL — SIGNIFICANT CHANGE UP (ref 0.2–1.2)
BUN SERPL-MCNC: 23 MG/DL — SIGNIFICANT CHANGE UP (ref 7–23)
CALCIUM SERPL-MCNC: 9.2 MG/DL — SIGNIFICANT CHANGE UP (ref 8.4–10.5)
CHLORIDE SERPL-SCNC: 107 MMOL/L — SIGNIFICANT CHANGE UP (ref 96–108)
CO2 SERPL-SCNC: 29 MMOL/L — SIGNIFICANT CHANGE UP (ref 22–31)
CREAT SERPL-MCNC: 1.15 MG/DL — SIGNIFICANT CHANGE UP (ref 0.5–1.3)
EGFR: 67 ML/MIN/1.73M2 — SIGNIFICANT CHANGE UP
GLUCOSE BLDC GLUCOMTR-MCNC: 119 MG/DL — HIGH (ref 70–99)
GLUCOSE SERPL-MCNC: 109 MG/DL — HIGH (ref 70–99)
HCT VFR BLD CALC: 33.7 % — LOW (ref 39–50)
HGB BLD-MCNC: 11.5 G/DL — LOW (ref 13–17)
LACTATE SERPL-SCNC: 1 MMOL/L — SIGNIFICANT CHANGE UP (ref 0.7–2)
MCHC RBC-ENTMCNC: 30.6 PG — SIGNIFICANT CHANGE UP (ref 27–34)
MCHC RBC-ENTMCNC: 34.1 G/DL — SIGNIFICANT CHANGE UP (ref 32–36)
MCV RBC AUTO: 89.6 FL — SIGNIFICANT CHANGE UP (ref 80–100)
NRBC # BLD: 0 /100 WBCS — SIGNIFICANT CHANGE UP (ref 0–0)
PLATELET # BLD AUTO: 199 K/UL — SIGNIFICANT CHANGE UP (ref 150–400)
POTASSIUM SERPL-MCNC: 4.1 MMOL/L — SIGNIFICANT CHANGE UP (ref 3.5–5.3)
POTASSIUM SERPL-SCNC: 4.1 MMOL/L — SIGNIFICANT CHANGE UP (ref 3.5–5.3)
PROT SERPL-MCNC: 5.9 G/DL — LOW (ref 6–8.3)
RBC # BLD: 3.76 M/UL — LOW (ref 4.2–5.8)
RBC # FLD: 13.8 % — SIGNIFICANT CHANGE UP (ref 10.3–14.5)
SODIUM SERPL-SCNC: 141 MMOL/L — SIGNIFICANT CHANGE UP (ref 135–145)
WBC # BLD: 5.17 K/UL — SIGNIFICANT CHANGE UP (ref 3.8–10.5)
WBC # FLD AUTO: 5.17 K/UL — SIGNIFICANT CHANGE UP (ref 3.8–10.5)

## 2024-11-01 PROCEDURE — 70450 CT HEAD/BRAIN W/O DYE: CPT | Mod: 26

## 2024-11-01 PROCEDURE — 93010 ELECTROCARDIOGRAM REPORT: CPT

## 2024-11-01 PROCEDURE — 99418 PROLNG IP/OBS E/M EA 15 MIN: CPT

## 2024-11-01 PROCEDURE — 99233 SBSQ HOSP IP/OBS HIGH 50: CPT

## 2024-11-01 PROCEDURE — 95816 EEG AWAKE AND DROWSY: CPT | Mod: 26

## 2024-11-01 PROCEDURE — 99223 1ST HOSP IP/OBS HIGH 75: CPT

## 2024-11-01 RX ORDER — LORAZEPAM 2 MG/1
1 TABLET ORAL ONCE
Refills: 0 | Status: DISCONTINUED | OUTPATIENT
Start: 2024-11-01 | End: 2024-11-01

## 2024-11-01 RX ORDER — LEVETIRACETAM 1000 MG/1
500 TABLET ORAL
Refills: 0 | Status: DISCONTINUED | OUTPATIENT
Start: 2024-11-01 | End: 2024-11-08

## 2024-11-01 RX ORDER — PANTOPRAZOLE SODIUM 40 MG/1
40 TABLET, DELAYED RELEASE ORAL
Refills: 0 | Status: DISCONTINUED | OUTPATIENT
Start: 2024-11-01 | End: 2024-11-19

## 2024-11-01 RX ORDER — LEVETIRACETAM 1000 MG/1
1000 TABLET ORAL ONCE
Refills: 0 | Status: COMPLETED | OUTPATIENT
Start: 2024-11-01 | End: 2024-11-01

## 2024-11-01 RX ORDER — METOPROLOL TARTRATE 100 MG/1
12.5 TABLET, FILM COATED ORAL
Refills: 0 | Status: DISCONTINUED | OUTPATIENT
Start: 2024-11-01 | End: 2024-11-02

## 2024-11-01 RX ADMIN — LEVETIRACETAM 400 MILLIGRAM(S): 1000 TABLET ORAL at 13:37

## 2024-11-01 RX ADMIN — AMIODARONE HYDROCHLORIDE 200 MILLIGRAM(S): 200 TABLET ORAL at 06:11

## 2024-11-01 RX ADMIN — ROSUVASTATIN CALCIUM 20 MILLIGRAM(S): 5 TABLET, FILM COATED ORAL at 21:04

## 2024-11-01 RX ADMIN — APIXABAN 5 MILLIGRAM(S): 2.5 TABLET, FILM COATED ORAL at 17:43

## 2024-11-01 RX ADMIN — LORAZEPAM 1 MILLIGRAM(S): 2 TABLET ORAL at 13:28

## 2024-11-01 RX ADMIN — APIXABAN 5 MILLIGRAM(S): 2.5 TABLET, FILM COATED ORAL at 06:12

## 2024-11-01 RX ADMIN — Medication 81 MILLIGRAM(S): at 11:13

## 2024-11-01 RX ADMIN — ACETAMINOPHEN, DIPHENHYDRAMINE HCL, PHENYLEPHRINE HCL 6 MILLIGRAM(S): 325; 25; 5 TABLET ORAL at 21:04

## 2024-11-01 RX ADMIN — LEVETIRACETAM 500 MILLIGRAM(S): 1000 TABLET ORAL at 17:43

## 2024-11-01 RX ADMIN — Medication 2 TABLET(S): at 21:04

## 2024-11-01 NOTE — DIETITIAN INITIAL EVALUATION ADULT - PERTINENT LABORATORY DATA
11-01    141  |  107  |  23  ----------------------------<  109[H]  4.1   |  29  |  1.15    Ca    9.2      01 Nov 2024 07:07    TPro  5.9[L]  /  Alb  2.7[L]  /  TBili  0.5  /  DBili  x   /  AST  62[H]  /  ALT  122[H]  /  AlkPhos  81  11-01

## 2024-11-01 NOTE — DIETITIAN INITIAL EVALUATION ADULT - OTHER INFO
74 yo right handed [place]-speaking M with PMHX HTN, atrial fibrillation, former smoker x 34 years presented to [facility] on 10/17 with right ICA occlusion and right MCA infarct s/p thrombectomy TICI 3. Discharged from [facility] day before CVA for left pulmonary nodule s/p Left lung lobectomy s/p chest tube. Had been off blood thinners (10/2-10/16)  prior to surgery, then noted to by in AFIB on day of [facility] discharge so was loaded with amiodarone and restarted on Eliquis and Brilinta. Etiology of stroke cardio embolic due to Afib off AC due to recent surgery (10/2-10/16) vs hypercoagulability due to malignancy. Lung path showing adenocarcinoma, no hemo/onc workup started.  Admitted for multidisciplinary rehab program on 10/31.    Pt continues on minced & moist diet at present, pending SLP eval. Pt states that he ate a little at bfast this morning- c/o foods provided too sweet. Reports UBW 135lbs, .4lbs. Height 5"5". Corrected BMI 22. No pressure ulcers or edema per nursing documentation. Pt denies N/V/D, constipation. Last BM 2 days ago per pt. Pt declines oral nutrition supplements at this time. Suggest MVI to cover general micronutrient needs.

## 2024-11-01 NOTE — EEG REPORT - NS EEG TEXT BOX
MIKA JESUSSELMA [id number]     Study Date: 11-01-24  Duration: 20 min    --------------------------------------------------------------------------------------------------  History:  CC/ HPI Patient is a 73y old  Male who presents with a chief complaint of R-MCA CVA (01 Nov 2024 12:06)    MEDICATIONS  (STANDING):  aMIOdarone    Tablet 200 milliGRAM(s) Oral daily  apixaban 5 milliGRAM(s) Oral two times a day  aspirin  chewable 81 milliGRAM(s) Oral daily  levETIRAcetam 500 milliGRAM(s) Oral two times a day  melatonin 6 milliGRAM(s) Oral at bedtime  metoprolol tartrate 12.5 milliGRAM(s) Oral two times a day  pantoprazole    Tablet 40 milliGRAM(s) Oral before breakfast  rosuvastatin 20 milliGRAM(s) Oral at bedtime  senna 2 Tablet(s) Oral at bedtime    --------------------------------------------------------------------------------------------------  Study Interpretation:    [[[Abbreviation Key:  PDR=alpha rhythm/posterior dominant rhythm. A-P=anterior posterior gradient.  Amplitude: ‘very low’:<20; ‘low’:20-50; ‘medium’:; ‘high’:>200uV.  Persistence for periodic/rhythmic patterns (% of epoch) ‘rare’:<1%; ‘occasional’:1-10%; ‘frequent’:10-50%; ‘abundant’:50-90%; ‘continuous’:>90%.  Persistence for sporadic discharges: ‘rare’:<1/hr; ‘occasional’:1/min-1/hr; ‘frequent’:>1/min; ‘abundant’:>1/10 sec.  GRDA=generalized rhythmic delta activity; FIRDA=frontal intermittent GRDA; LRDA=lateralized rhythmic delta activity; TIRDA=temporal intermittent rhythmic delta activity;  LPD=PLED=lateralized periodic discharges; GPD=generalized periodic discharges; BiPDs=BiPLEDs=bilateral independent periodic epileptiform discharges; SIRPID=stimulus induced rhythmic, periodic, or ictal appearing discharges; BIRDs=brief potentially ictal rhythmic discharges >4 Hz, lasting .5-10s; PFA=paroxysmal bursts of beta/gamma; LVFA=low voltage fast activity.  Modifiers: +F=with fast component; +S=with spike component; +R=with rhythmic component.  S-B=burst suppression pattern.  Max=maximal. N1-drowsy; N2-stage II sleep; N3-slow wave sleep. SSS/BETS=small sharp spikes/benign epileptiform transients of sleep. HV=hyperventilation; PS=photic stimulation]]]    FINDINGS:      Background:  Continuity: Continuous  Symmetry: Symmetric  PDR: 8.5 - 9 Hz activity, with amplitude to 40 uV, that attenuated to eye opening.    Reactivity: Present  Voltage: Normal (between 20-150uV)  Anterior Posterior Gradient: Present  Other background findings: Excess diffuse beta activity was present.  Breach: Absent    Background Slowing:  Generalized slowing: Diffuse irregular delta and theta activity was present.  Focal slowing: Intermittent right hemispheric focal polymorphic delta and theta activity was present.    State Changes:   -Drowsiness noted with increased slowing of the background, attenuation of fast activity, vertex transients.  -Present with N2 sleep transients with asymmetric spindles and K-complexes.    Sporadic Epileptiform Discharges:    None    Rhythmic and Periodic Patterns (RPPs):  None     Electrographic and Electroclinical seizures:  None    Other Clinical Events:  None    Activation Procedures:   -Hyperventilation was not performed.    -Photic stimulation was performed and did not elicit any abnormalities.       Artifacts:  Intermittent myogenic and movement artifacts were noted.    ECG:  The heart rate on single channel ECG was predominantly between 45 - 55 BPM.    EEG Classification / Summary:  Abnormal EEG study in the awake / drowsy / asleep states  Right hemispheric focal slowing, intermittent  Mild generalized background slowing  Excess diffuse beta activity    -----------------------------------------------------------------------------------------------------    Clinical Impression:  Abnormal EEG study  Focal cerebral dysfunction in the right hemisphere.  Mild diffuse/multi-focal cerebral dysfunction, not specific as to etiology.  There were no seizures or epileptiform abnormalities recorded.      Excess diffuse beta activity which typically is seen in the setting of sedative medications (benzodiazepines, barbiturates).    Bradycardia was seen on ECG recording throughout the study.    This is a preliminary impression still pending attendings attestation.     -------------------------------------------------------------------------------------------------------  EEG reading room: [phone]    After-hours epilepsy service: [phone]      Eusebio Mancia DO  Epilepsy Fellow   MIKA JESUSSELMA [id number]     Study Date: 11-01-24  Duration: 20 min    --------------------------------------------------------------------------------------------------  History:  CC/ HPI Patient is a 73y old  Male who presents with a chief complaint of R-MCA CVA (01 Nov 2024 12:06)    MEDICATIONS  (STANDING):  aMIOdarone    Tablet 200 milliGRAM(s) Oral daily  apixaban 5 milliGRAM(s) Oral two times a day  aspirin  chewable 81 milliGRAM(s) Oral daily  levETIRAcetam 500 milliGRAM(s) Oral two times a day  melatonin 6 milliGRAM(s) Oral at bedtime  metoprolol tartrate 12.5 milliGRAM(s) Oral two times a day  pantoprazole    Tablet 40 milliGRAM(s) Oral before breakfast  rosuvastatin 20 milliGRAM(s) Oral at bedtime  senna 2 Tablet(s) Oral at bedtime    --------------------------------------------------------------------------------------------------  Study Interpretation:    [[[Abbreviation Key:  PDR=alpha rhythm/posterior dominant rhythm. A-P=anterior posterior gradient.  Amplitude: ‘very low’:<20; ‘low’:20-50; ‘medium’:; ‘high’:>200uV.  Persistence for periodic/rhythmic patterns (% of epoch) ‘rare’:<1%; ‘occasional’:1-10%; ‘frequent’:10-50%; ‘abundant’:50-90%; ‘continuous’:>90%.  Persistence for sporadic discharges: ‘rare’:<1/hr; ‘occasional’:1/min-1/hr; ‘frequent’:>1/min; ‘abundant’:>1/10 sec.  GRDA=generalized rhythmic delta activity; FIRDA=frontal intermittent GRDA; LRDA=lateralized rhythmic delta activity; TIRDA=temporal intermittent rhythmic delta activity;  LPD=PLED=lateralized periodic discharges; GPD=generalized periodic discharges; BiPDs=BiPLEDs=bilateral independent periodic epileptiform discharges; SIRPID=stimulus induced rhythmic, periodic, or ictal appearing discharges; BIRDs=brief potentially ictal rhythmic discharges >4 Hz, lasting .5-10s; PFA=paroxysmal bursts of beta/gamma; LVFA=low voltage fast activity.  Modifiers: +F=with fast component; +S=with spike component; +R=with rhythmic component.  S-B=burst suppression pattern.  Max=maximal. N1-drowsy; N2-stage II sleep; N3-slow wave sleep. SSS/BETS=small sharp spikes/benign epileptiform transients of sleep. HV=hyperventilation; PS=photic stimulation]]]    FINDINGS:      Background:  Continuity: Continuous  Symmetry: Symmetric  PDR: 8.5 - 9 Hz activity, with amplitude to 40 uV, that attenuated to eye opening.    Reactivity: Present  Voltage: Normal (between 20-150uV)  Anterior Posterior Gradient: Present  Other background findings: Excess diffuse beta activity was present.  Breach: Absent    Background Slowing:  Generalized slowing: Diffuse irregular delta and theta activity was present.  Focal slowing: Intermittent right hemispheric focal polymorphic delta and theta activity was present.    State Changes:   -Drowsiness noted with increased slowing of the background, attenuation of fast activity, vertex transients.  -Present with N2 sleep transients with asymmetric spindles and K-complexes.    Sporadic Epileptiform Discharges:    None    Rhythmic and Periodic Patterns (RPPs):  None     Electrographic and Electroclinical seizures:  None    Other Clinical Events:  None    Activation Procedures:   -Hyperventilation was not performed.    -Photic stimulation was performed and did not elicit any abnormalities.       Artifacts:  Intermittent myogenic and movement artifacts were noted.    ECG:  The heart rate on single channel ECG was predominantly between 45 - 55 BPM.    EEG Classification / Summary:  Abnormal EEG study in the awake / drowsy / asleep states  Right hemispheric focal slowing, continuous  Asymmetry, loss of faster freqeuncies, right hemisphere.  Mild generalized background slowing  Excess diffuse beta activity    -----------------------------------------------------------------------------------------------------    Clinical Impression:  Abnormal EEG study  Focal cerebral dysfunction or structural abnormality in the right hemisphere.  Mild diffuse/multi-focal cerebral dysfunction, not specific as to etiology.  There were no seizures or epileptiform abnormalities recorded.      Excess diffuse beta activity which typically is seen in the setting of sedative medications (benzodiazepines, barbiturates).    Bradycardia was seen on ECG recording throughout the study.    -------------------------------------------------------------------------------------------------------  EEG reading room: [phone]    After-hours epilepsy service: [phone]    Eusebio Mancia DO  Epilepsy Fellow    Bennie Cheek MD PhD  Director, Epilepsy Division, [facility]

## 2024-11-01 NOTE — PROGRESS NOTE ADULT - SUBJECTIVE AND OBJECTIVE BOX
SUBJECTIVE/ROS: Denies chest pain, fever, chills, nausea, vomiting, abdominal pain, headache, or BLE pain. Transaminitis noted on labs. Hospitalist following. Noted to be bradycardic, hospitalist following.     HPI:  73 year old right handed [place]-speaking man with HTN, atrial fibrillation on Eliquis and Amiodarone, on Brilinta, former smoker x 34 years (quit 22months ago) discharged from  [facility] on 10/16 for left pulmonary nodule s/p Left lung lobectomy s/p chest tube presented to [facility] ED (10/17/24) with left sided weakness and left gaze preference. Daughter states patient last known normal at 11 am the last time she saw him well before she left to go to the store. She returned home at 1 pm and noted patient with left sided weakness and called EMS. Daughter confirmed she gave patient his Eliquis this morning. At baseline patient walks without assistive devices. On initial stroke evaluation, NIHSS-18 for patient awake with minimal verbal output right gaze preference not able to cross midline, left facial droop and LUE 0/5. LLE 1/5. CT with no ICH but noted dense right MCA. CTA head/neck with right ICA occlusion after the bifurcation extending to the supraclinoid ICA and right MCA, noted E/ICAD including L-ICA cavernous/supraclinoid segment mutlifocal stenosis, left VA mild to moderate stenosis. Patient not a candidate for TNK as on Eliquis and compliant. Patient underwent thrombectomy TICI 3. Patient admitted to MICU for close observation, required nicardipene drip. Repeat CTH with evolving Right MCA stroke without hemorrhage. Started ASA. Patient downgraded to Stroke Unit (10/19). A 1 c 6.1 (pre-OM), LDL 43 (wnl), TG 69 (wnl). TTE with mildly enlarged left atrium, mild concentric LVH, LV hyperdynamic EF >70%. [facility] CT surgery RAGINI Chacko from Dr. Mark Velez office [phone] reported that patient was on Eliquis and Brilinta prior to admission, admitted to [facility] 10/8 for left lower wedge x2 / lobectomy which required chest tube placement and was removed 10/16 and was told to hold blood thinners 3-7 days prior to admission. On discharge from [facility], EP was consulted as patient was in afib and recommend Amio load and resume Eliquis/Brilinta 10/16. Pathology resulted with adenocarcinoma but pending LN results for staging. No other HemOnc testing has been done. MRI brain with R-MCA territory infarct. Etiology of stroke cardio embolic due to Afib off AC due to recent surgery (10/2-10/16) vs hypercoagulability due to malignancy.  (31 Oct 2024 12:48)    PHYSICAL EXAM    Vital Signs Last 24 Hrs  T(C): 36.7 (01 Nov 2024 07:13), Max: 37 (31 Oct 2024 17:08)  T(F): 98 (01 Nov 2024 07:13), Max: 98.6 (31 Oct 2024 17:08)  HR: 49 (01 Nov 2024 07:13) (46 - 63)  BP: 124/74 (01 Nov 2024 07:13) (116/64 - 126/75)  RR: 16 (01 Nov 2024 07:13) (16 - 16)  SpO2: 99% (01 Nov 2024 07:13) (96% - 99%)    Parameters below as of 01 Nov 2024 06:00  Patient On (Oxygen Delivery Method): room air    Daily Height in cm: 157.48 (31 Oct 2024 17:08)    Daily       PHYSICAL EXAM  Constitutional - NAD, Comfortable   HEENT - NCAT, EOMI  Neck - Supple, No limited ROM  Chest - Breathing comfortably   Cardiovascular - RRR, S1S2  Abdomen -BS+, Soft, NTND  Extremities - No C/C/E, No calf tenderness   Neurologic Exam - no new focal deficits    RECENT LABS:                        11.5   5.17  )-----------( 199      ( 01 Nov 2024 07:07 )             33.7     11-01    141  |  107  |  23  ----------------------------<  109[H]  4.1   |  29  |  1.15    Ca    9.2      01 Nov 2024 07:07    TPro  5.9[L]  /  Alb  2.7[L]  /  TBili  0.5  /  DBili  x   /  AST  62[H]  /  ALT  122[H]  /  AlkPhos  81  11-01    LIVER FUNCTIONS - ( 01 Nov 2024 07:07 )  Alb: 2.7 g/dL / Pro: 5.9 g/dL / ALK PHOS: 81 U/L / ALT: 122 U/L / AST: 62 U/L / GGT: x           MEDICATIONS  (STANDING):  aMIOdarone    Tablet 200 milliGRAM(s) Oral daily  apixaban 5 milliGRAM(s) Oral two times a day  aspirin  chewable 81 milliGRAM(s) Oral daily  melatonin 6 milliGRAM(s) Oral at bedtime  metoprolol tartrate 25 milliGRAM(s) Oral every 12 hours  pantoprazole    Tablet 40 milliGRAM(s) Oral before breakfast  rosuvastatin 20 milliGRAM(s) Oral at bedtime  senna 2 Tablet(s) Oral at bedtime    MEDICATIONS  (PRN):  acetaminophen     Tablet .. 650 milliGRAM(s) Oral every 6 hours PRN Temp greater or equal to 38C (100.4F), Mild Pain (1 - 3)  polyethylene glycol 3350 17 Gram(s) Oral at bedtime PRN Constipation   SUBJECTIVE/ROS: patient seen in his room with  via phone ([employer] ). Subjectively feeling well. No pain, no new complaints. On exam, evident continuous twitching of left hemiface. Patient partially oriented but without new/worsening focal deficits.   ECG bradycardia. EEG ordered. IV ativan 1 mg push and Keppra 1 g load ordered. Family updated.   Denies chest pain, fever, chills, nausea, vomiting, abdominal pain, headache, or BLE pain. Transaminitis noted on labs. Hospitalist following. Noted to be bradycardic, reduced metoprolol.      HPI:  73 year old right handed [facility]-speaking man with HTN, atrial fibrillation on Eliquis and Amiodarone, on Brilinta, former smoker x 34 years (quit 22months ago) discharged from  [facility] on 10/16 for left pulmonary nodule s/p Left lung lobectomy s/p chest tube presented to [facility] ED (10/17/24) with left sided weakness and left gaze preference. Daughter states patient last known normal at 11 am the last time she saw him well before she left to go to the store. She returned home at 1 pm and noted patient with left sided weakness and called EMS. Daughter confirmed she gave patient his Eliquis this morning. At baseline patient walks without assistive devices. On initial stroke evaluation, NIHSS-18 for patient awake with minimal verbal output right gaze preference not able to cross midline, left facial droop and LUE 0/5. LLE 1/5. CT with no ICH but noted dense right MCA. CTA head/neck with right ICA occlusion after the bifurcation extending to the supraclinoid ICA and right MCA, noted E/ICAD including L-ICA cavernous/supraclinoid segment mutlifocal stenosis, left VA mild to moderate stenosis. Patient not a candidate for TNK as on Eliquis and compliant. Patient underwent thrombectomy TICI 3. Patient admitted to MICU for close observation, required nicardipene drip. Repeat CTH with evolving Right MCA stroke without hemorrhage. Started ASA. Patient downgraded to Stroke Unit (10/19). A 1 c 6.1 (pre-OM), LDL 43 (wnl), TG 69 (wnl). TTE with mildly enlarged left atrium, mild concentric LVH, LV hyperdynamic EF >70%. [facility] CT surgery RAGINI Chacko from Dr. Mark Velez office [phone] reported that patient was on Eliquis and Brilinta prior to admission, admitted to [facility] 10/8 for left lower wedge x2 / lobectomy which required chest tube placement and was removed 10/16 and was told to hold blood thinners 3-7 days prior to admission. On discharge from [facility], EP was consulted as patient was in afib and recommend Amio load and resume Eliquis/Brilinta 10/16. Pathology resulted with adenocarcinoma but pending LN results for staging. No other HemOnc testing has been done. MRI brain with R-MCA territory infarct. Etiology of stroke cardio embolic due to Afib off AC due to recent surgery (10/2-10/16) vs hypercoagulability due to malignancy.  (31 Oct 2024 12:48)    PHYSICAL EXAM    Vital Signs Last 24 Hrs  T(C): 36.7 (01 Nov 2024 07:13), Max: 37 (31 Oct 2024 17:08)  T(F): 98 (01 Nov 2024 07:13), Max: 98.6 (31 Oct 2024 17:08)  HR: 49 (01 Nov 2024 07:13) (46 - 63)  BP: 124/74 (01 Nov 2024 07:13) (116/64 - 126/75)  RR: 16 (01 Nov 2024 07:13) (16 - 16)  SpO2: 99% (01 Nov 2024 07:13) (96% - 99%)    Parameters below as of 01 Nov 2024 06:00  Patient On (Oxygen Delivery Method): room air    Daily Height in cm: 157.48 (31 Oct 2024 17:08)    Daily       PHYSICAL EXAM  Constitutional - NAD, Comfortable in bed   HEENT - NCAT, EOMI  Neck - Supple, No limited ROM  Chest - Breathing comfortably in room air   Cardiovascular - RR, bradycardia   Extremities - No C/C/E, No calf tenderness   Neurologic Exam - patient alert, partially oriented to place (hospital in [place]), intermittently oriented to time. Continuous left facial twitching of which patient is unaware. Language normal. Mild/moderate dysarthria. Moves all limbs against gravity. RUE 3/5 proximally, 0/5 distally, LUE 4/5 proximally, 5/5 distally. Left tactile inattention.     RECENT LABS:                        11.5   5.17  )-----------( 199      ( 01 Nov 2024 07:07 )             33.7     11-01    141  |  107  |  23  ----------------------------<  109[H]  4.1   |  29  |  1.15    Ca    9.2      01 Nov 2024 07:07    TPro  5.9[L]  /  Alb  2.7[L]  /  TBili  0.5  /  DBili  x   /  AST  62[H]  /  ALT  122[H]  /  AlkPhos  81  11-01    LIVER FUNCTIONS - ( 01 Nov 2024 07:07 )  Alb: 2.7 g/dL / Pro: 5.9 g/dL / ALK PHOS: 81 U/L / ALT: 122 U/L / AST: 62 U/L / GGT: x           MEDICATIONS  (STANDING):  aMIOdarone    Tablet 200 milliGRAM(s) Oral daily  apixaban 5 milliGRAM(s) Oral two times a day  aspirin  chewable 81 milliGRAM(s) Oral daily  melatonin 6 milliGRAM(s) Oral at bedtime  metoprolol tartrate 25 milliGRAM(s) Oral every 12 hours  pantoprazole    Tablet 40 milliGRAM(s) Oral before breakfast  rosuvastatin 20 milliGRAM(s) Oral at bedtime  senna 2 Tablet(s) Oral at bedtime    MEDICATIONS  (PRN):  acetaminophen     Tablet .. 650 milliGRAM(s) Oral every 6 hours PRN Temp greater or equal to 38C (100.4F), Mild Pain (1 - 3)  polyethylene glycol 3350 17 Gram(s) Oral at bedtime PRN Constipation   SUBJECTIVE/ROS: patient seen in his room with  via phone ([employer] ). Subjectively feeling well. No pain, no new complaints. On exam, evident continuous twitching of left hemiface. Patient partially oriented but without new/worsening focal deficits.   ECG bradycardia. EEG ordered. CT head ordered. IV ativan 1 mg push and Keppra 1 g load ordered. Family updated.   Denies chest pain, fever, chills, nausea, vomiting, abdominal pain, headache, or BLE pain. Transaminitis noted on labs. Hospitalist following. Noted to be bradycardic, reduced metoprolol.      HPI:  73 year old right handed [place]-speaking man with HTN, atrial fibrillation on Eliquis and Amiodarone, on Brilinta, former smoker x 34 years (quit 22months ago) discharged from  [facility] on 10/16 for left pulmonary nodule s/p Left lung lobectomy s/p chest tube presented to [facility] ED (10/17/24) with left sided weakness and left gaze preference. Daughter states patient last known normal at 11 am the last time she saw him well before she left to go to the store. She returned home at 1 pm and noted patient with left sided weakness and called EMS. Daughter confirmed she gave patient his Eliquis this morning. At baseline patient walks without assistive devices. On initial stroke evaluation, NIHSS-18 for patient awake with minimal verbal output right gaze preference not able to cross midline, left facial droop and LUE 0/5. LLE 1/5. CT with no ICH but noted dense right MCA. CTA head/neck with right ICA occlusion after the bifurcation extending to the supraclinoid ICA and right MCA, noted E/ICAD including L-ICA cavernous/supraclinoid segment mutlifocal stenosis, left VA mild to moderate stenosis. Patient not a candidate for TNK as on Eliquis and compliant. Patient underwent thrombectomy TICI 3. Patient admitted to MICU for close observation, required nicardipene drip. Repeat CTH with evolving Right MCA stroke without hemorrhage. Started ASA. Patient downgraded to Stroke Unit (10/19). A 1 c 6.1 (pre-OM), LDL 43 (wnl), TG 69 (wnl). TTE with mildly enlarged left atrium, mild concentric LVH, LV hyperdynamic EF >70%. [facility] CT surgery PA Ricco from Dr. Mark Velez office [phone] reported that patient was on Eliquis and Brilinta prior to admission, admitted to [facility] 10/8 for left lower wedge x2 / lobectomy which required chest tube placement and was removed 10/16 and was told to hold blood thinners 3-7 days prior to admission. On discharge from [facility], EP was consulted as patient was in afib and recommend Amio load and resume Eliquis/Brilinta 10/16. Pathology resulted with adenocarcinoma but pending LN results for staging. No other HemOnc testing has been done. MRI brain with R-MCA territory infarct. Etiology of stroke cardio embolic due to Afib off AC due to recent surgery (10/2-10/16) vs hypercoagulability due to malignancy.  (31 Oct 2024 12:48)    PHYSICAL EXAM    Vital Signs Last 24 Hrs  T(C): 36.7 (01 Nov 2024 07:13), Max: 37 (31 Oct 2024 17:08)  T(F): 98 (01 Nov 2024 07:13), Max: 98.6 (31 Oct 2024 17:08)  HR: 49 (01 Nov 2024 07:13) (46 - 63)  BP: 124/74 (01 Nov 2024 07:13) (116/64 - 126/75)  RR: 16 (01 Nov 2024 07:13) (16 - 16)  SpO2: 99% (01 Nov 2024 07:13) (96% - 99%)    Parameters below as of 01 Nov 2024 06:00  Patient On (Oxygen Delivery Method): room air    Daily Height in cm: 157.48 (31 Oct 2024 17:08)    Daily       PHYSICAL EXAM  Constitutional - NAD, Comfortable in bed   HEENT - NCAT, EOMI  Neck - Supple, No limited ROM  Chest - Breathing comfortably in room air   Cardiovascular - RR, bradycardia   Extremities - No C/C/E, No calf tenderness   Neurologic Exam - patient alert, partially oriented to place (hospital in [place]), intermittently oriented to time. Continuous left facial twitching of which patient is unaware. Language normal. Mild/moderate dysarthria. Moves all limbs against gravity. RUE 3/5 proximally, 0/5 distally, LUE 4/5 proximally, 5/5 distally. Left tactile inattention.     RECENT LABS:                        11.5   5.17  )-----------( 199      ( 01 Nov 2024 07:07 )             33.7     11-01    141  |  107  |  23  ----------------------------<  109[H]  4.1   |  29  |  1.15    Ca    9.2      01 Nov 2024 07:07    TPro  5.9[L]  /  Alb  2.7[L]  /  TBili  0.5  /  DBili  x   /  AST  62[H]  /  ALT  122[H]  /  AlkPhos  81  11-01    LIVER FUNCTIONS - ( 01 Nov 2024 07:07 )  Alb: 2.7 g/dL / Pro: 5.9 g/dL / ALK PHOS: 81 U/L / ALT: 122 U/L / AST: 62 U/L / GGT: x           MEDICATIONS  (STANDING):  aMIOdarone    Tablet 200 milliGRAM(s) Oral daily  apixaban 5 milliGRAM(s) Oral two times a day  aspirin  chewable 81 milliGRAM(s) Oral daily  melatonin 6 milliGRAM(s) Oral at bedtime  metoprolol tartrate 25 milliGRAM(s) Oral every 12 hours  pantoprazole    Tablet 40 milliGRAM(s) Oral before breakfast  rosuvastatin 20 milliGRAM(s) Oral at bedtime  senna 2 Tablet(s) Oral at bedtime    MEDICATIONS  (PRN):  acetaminophen     Tablet .. 650 milliGRAM(s) Oral every 6 hours PRN Temp greater or equal to 38C (100.4F), Mild Pain (1 - 3)  polyethylene glycol 3350 17 Gram(s) Oral at bedtime PRN Constipation   SUBJECTIVE/ROS: patient seen in his room with  via phone ([employer] #[phone]). Subjectively feeling well. No pain, no new complaints. On exam, evident continuous twitching of left hemiface. Patient partially oriented but without new/worsening focal deficits.   ECG sinus bradycardia. EEG ordered. CT head ordered. IV ativan 1 mg push and Keppra 1 g load ordered.   Denies chest pain, fever, chills, nausea, vomiting, abdominal pain, headache, or BLE pain. Transaminitis noted on labs. Hospitalist following. Noted to be bradycardic, reduced metoprolol.      On updating family, they report a history of a prior stroke about 10 years prior, with residual L facial droop and occasional episodes of twitching of the L face. Patient did not follow up with a neurologist.      HPI:  73 year old right handed [place]-speaking man with HTN, atrial fibrillation on Eliquis and Amiodarone, on Brilinta, former smoker x 34 years (quit 22months ago) discharged from  [facility] on 10/16 for left pulmonary nodule s/p Left lung lobectomy s/p chest tube presented to [facility] ED (10/17/24) with left sided weakness and left gaze preference. Daughter states patient last known normal at 11 am the last time she saw him well before she left to go to the store. She returned home at 1 pm and noted patient with left sided weakness and called EMS. Daughter confirmed she gave patient his Eliquis this morning. At baseline patient walks without assistive devices. On initial stroke evaluation, NIHSS-18 for patient awake with minimal verbal output right gaze preference not able to cross midline, left facial droop and LUE 0/5. LLE 1/5. CT with no ICH but noted dense right MCA. CTA head/neck with right ICA occlusion after the bifurcation extending to the supraclinoid ICA and right MCA, noted E/ICAD including L-ICA cavernous/supraclinoid segment mutlifocal stenosis, left VA mild to moderate stenosis. Patient not a candidate for TNK as on Eliquis and compliant. Patient underwent thrombectomy TICI 3. Patient admitted to MICU for close observation, required nicardipene drip. Repeat CTH with evolving Right MCA stroke without hemorrhage. Started ASA. Patient downgraded to Stroke Unit (10/19). A 1 c 6.1 (pre-OM), LDL 43 (wnl), TG 69 (wnl). TTE with mildly enlarged left atrium, mild concentric LVH, LV hyperdynamic EF >70%. [facility] CT surgery RAGINI Chacko from Dr. Mark Velez office [phone] reported that patient was on Eliquis and Brilinta prior to admission, admitted to [facility] 10/8 for left lower wedge x2 / lobectomy which required chest tube placement and was removed 10/16 and was told to hold blood thinners 3-7 days prior to admission. On discharge from [facility], EP was consulted as patient was in afib and recommend Amio load and resume Eliquis/Brilinta 10/16. Pathology resulted with adenocarcinoma but pending LN results for staging. No other HemOnc testing has been done. MRI brain with R-MCA territory infarct. Etiology of stroke cardio embolic due to Afib off AC due to recent surgery (10/2-10/16) vs hypercoagulability due to malignancy.  (31 Oct 2024 12:48)    PHYSICAL EXAM    Vital Signs Last 24 Hrs  T(C): 36.7 (01 Nov 2024 07:13), Max: 37 (31 Oct 2024 17:08)  T(F): 98 (01 Nov 2024 07:13), Max: 98.6 (31 Oct 2024 17:08)  HR: 49 (01 Nov 2024 07:13) (46 - 63)  BP: 124/74 (01 Nov 2024 07:13) (116/64 - 126/75)  RR: 16 (01 Nov 2024 07:13) (16 - 16)  SpO2: 99% (01 Nov 2024 07:13) (96% - 99%)    Parameters below as of 01 Nov 2024 06:00  Patient On (Oxygen Delivery Method): room air    Daily Height in cm: 157.48 (31 Oct 2024 17:08)    Daily       PHYSICAL EXAM  Constitutional - NAD, Comfortable in bed   HEENT - NCAT, EOMI  Neck - Supple, No limited ROM  Chest - Breathing comfortably in room air   Cardiovascular - RR, bradycardia   Extremities - No C/C/E, No calf tenderness   Neurologic Exam - patient alert, partially oriented to place (hospital in [place]), intermittently oriented to time. Continuous left facial twitching of which patient is unaware. Language normal. Mild/moderate dysarthria. L facial droop. Moves all limbs against gravity. RUE 3/5 proximally, 0/5 distally, LUE 4/5 proximally, 5/5 distally. Left tactile inattention.     RECENT LABS:                        11.5   5.17  )-----------( 199      ( 01 Nov 2024 07:07 )             33.7     11-01    141  |  107  |  23  ----------------------------<  109[H]  4.1   |  29  |  1.15    Ca    9.2      01 Nov 2024 07:07    TPro  5.9[L]  /  Alb  2.7[L]  /  TBili  0.5  /  DBili  x   /  AST  62[H]  /  ALT  122[H]  /  AlkPhos  81  11-01    LIVER FUNCTIONS - ( 01 Nov 2024 07:07 )  Alb: 2.7 g/dL / Pro: 5.9 g/dL / ALK PHOS: 81 U/L / ALT: 122 U/L / AST: 62 U/L / GGT: x           MEDICATIONS  (STANDING):  aMIOdarone    Tablet 200 milliGRAM(s) Oral daily  apixaban 5 milliGRAM(s) Oral two times a day  aspirin  chewable 81 milliGRAM(s) Oral daily  melatonin 6 milliGRAM(s) Oral at bedtime  metoprolol tartrate 25 milliGRAM(s) Oral every 12 hours  pantoprazole    Tablet 40 milliGRAM(s) Oral before breakfast  rosuvastatin 20 milliGRAM(s) Oral at bedtime  senna 2 Tablet(s) Oral at bedtime    MEDICATIONS  (PRN):  acetaminophen     Tablet .. 650 milliGRAM(s) Oral every 6 hours PRN Temp greater or equal to 38C (100.4F), Mild Pain (1 - 3)  polyethylene glycol 3350 17 Gram(s) Oral at bedtime PRN Constipation

## 2024-11-01 NOTE — CONSULT NOTE ADULT - SUBJECTIVE AND OBJECTIVE BOX
Patient is a 73y old  Male who presents with a chief complaint of Cerebral infarction  (01 Nov 2024 09:17)    HPI:  73 year old right handed [place]-speaking man with HTN, atrial fibrillation on Eliquis and Amiodarone, on Brilinta, former smoker x 34 years (quit 22months ago) discharged from [facility] on 10/16 for left pulmonary nodule s/p Left lung lobectomy s/p chest tube presented to [facility] ED (10/17/24) with left sided weakness and left gaze preference. Daughter states patient last known normal at 11 am the last time she saw him well before she left to go to the store. She returned home at 1 pm and noted patient with left sided weakness and called EMS. Daughter confirmed she gave patient his Eliquis that morning. At baseline patient walks without assistive devices. On initial stroke evaluation, NIHSS-18 for patient awake with minimal verbal output right gaze preference not able to cross midline, left facial droop and LUE 0/5. LLE 1/5. CT with no ICH but noted dense right MCA. CTA head/neck with right ICA occlusion after the bifurcation extending to the supraclinoid ICA and right MCA, noted E/ICAD including L-ICA cavernous/supraclinoid segment mutlifocal stenosis, left VA mild to moderate stenosis. Patient not a candidate for TNK as on Eliquis and compliant. Patient underwent thrombectomy TICI 3. Patient admitted to MICU for close observation, required nicardipene drip. Repeat CTH with evolving Right MCA stroke without hemorrhage. Started ASA. Patient downgraded to Stroke Unit (10/19). A 1 c 6.1 (pre-OM), LDL 43 (wnl), TG 69 (wnl). TTE with mildly enlarged left atrium, mild concentric LVH, LV hyperdynamic EF >70%. [facility] CT surgery RAGINI Chacko from Dr. Mark Velez office [phone] reported that patient was on Eliquis and Brilinta prior to admission, admitted to [facility] 10/8 for left lower wedge x2 / lobectomy which required chest tube placement and was removed 10/16 and was told to hold blood thinners 3-7 days prior to admission. On discharge from [facility], EP was consulted as patient was in afib and recommend Amio load and resume Eliquis/Brilinta 10/16. Pathology resulted with adenocarcinoma but pending LN results for staging. No other HemOnc testing has been done. MRI brain with R-MCA territory infarct. Etiology of stroke cardio embolic due to Afib off AC due to recent surgery (10/2-10/16) vs hypercoagulability due to malignancy.  (31 Oct 2024 12:48)    Patient seen and examined at bedside, stable, no acute medical complaints. +LUE weakness persists.    PAST MEDICAL & SURGICAL HISTORY:  CAD (coronary artery disease)  Hypertension  Solitary pulmonary nodule  Current smoker  Stroke  Left hydrocele  Afib  Adenocarcinoma, lung  H/O abdominal surgery  History of percutaneous coronary intervention  S/P cataract surgery  S/P lobectomy of lung    SOCIAL HISTORY: +tobacco use 34 pack years, quit 22 months ago. PTA independent in ADLs, IADLs    ALLERGIES:  No Known Allergies    MEDICATIONS  (STANDING):  aMIOdarone    Tablet 200 milliGRAM(s) Oral daily  apixaban 5 milliGRAM(s) Oral two times a day  aspirin  chewable 81 milliGRAM(s) Oral daily  melatonin 6 milliGRAM(s) Oral at bedtime  metoprolol tartrate 25 milliGRAM(s) Oral every 12 hours  pantoprazole    Tablet 40 milliGRAM(s) Oral before breakfast  rosuvastatin 20 milliGRAM(s) Oral at bedtime  senna 2 Tablet(s) Oral at bedtime    MEDICATIONS  (PRN):  acetaminophen     Tablet .. 650 milliGRAM(s) Oral every 6 hours PRN Temp greater or equal to 38C (100.4F), Mild Pain (1 - 3)  polyethylene glycol 3350 17 Gram(s) Oral at bedtime PRN Constipation    Review of Systems: Refer to HPI for pertinent positives and negatives. All other ROS reviewed and negative except as otherwise stated above.    Vital Signs Last 24 Hrs  T(F): 98 (01 Nov 2024 07:13), Max: 98.6 (31 Oct 2024 17:08)  HR: 49 (01 Nov 2024 07:13) (46 - 63)  BP: 124/74 (01 Nov 2024 07:13) (116/64 - 126/75)  RR: 16 (01 Nov 2024 07:13) (16 - 16)  SpO2: 99% (01 Nov 2024 07:13) (96% - 99%)  I&O's Summary    PHYSICAL EXAM:  GENERAL: NAD, well-groomed, well-developed  HEAD:  Atraumatic, Normocephalic  EYES: EOMI, PERRL, conjunctiva and sclera clear  ENMT: Moist mucous membranes, Good dentition  NECK: Supple, No JVD  CHEST/LUNG: Clear to auscultation bilaterally, non-labored breathing, good air entry, diminished left lung  HEART: RRR; S1/S2  ABDOMEN: Soft, Nontender, Nondistended; Bowel sounds present  VASCULAR: Normal pulses, Normal capillary refill  EXTREMITIES: No cyanosis, No edema LE b/l  LYMPH: No lymphadenopathy noted  SKIN: Warm, Intact. +left flank incision c/d/i  PSYCH: Normal mood and affect  NERVOUS SYSTEM: AAOx3, Fair concentration; +dysarthria, +left facial asymmetry. +LUE weakness. RUE, b/l LE strength 5/5    LABS:                        11.5   5.17  )-----------( 199      ( 01 Nov 2024 07:07 )             33.7     11-01    141  |  107  |  23  ----------------------------<  109  4.1   |  29  |  1.15    Ca    9.2      01 Nov 2024 07:07    TPro  5.9  /  Alb  2.7  /  TBili  0.5  /  DBili  x   /  AST  62  /  ALT  122  /  AlkPhos  81  11-01                              Urinalysis Basic - ( 01 Nov 2024 07:07 )    Color: x / Appearance: x / SG: x / pH: x  Gluc: 109 mg/dL / Ketone: x  / Bili: x / Urobili: x   Blood: x / Protein: x / Nitrite: x   Leuk Esterase: x / RBC: x / WBC x   Sq Epi: x / Non Sq Epi: x / Bacteria: x        COVID-19 PCR: NotDetec (10-31-24 @ 17:56)    RADIOLOGY & ADDITIONAL TESTS:    < from: Xray Chest 1 View- PORTABLE-Urgent (Xray Chest 1 View- PORTABLE-Urgent .) (10.16.24 @ 14:54) >      IMPRESSION: Status post chest tube removal with tinyapical pneumothorax.    --- End of Report ---    < end of copied text >  < from: TTE W or WO Ultrasound Enhancing Agent (10.11.24 @ 11:09) >     CONCLUSIONS:      1. Left ventricular cavity is normal in size. Left ventricular wall thickness is normal. Left ventricular systolic function is normal with an ejection fraction of 69 % by Samuels's method of disks. There are no regional wall motion abnormalities seen.   2. Normal right ventricular cavity size, with normal wall thickness, and normal right ventricular systolic function. Tricuspid annular plane systolic excursion (TAPSE) is 2.7 cm (normal >=1.7 cm).   3. Normal left and right atrial size.   4. No significant valvular disease.   5. The inferior vena cava is normal in size measuring 1.20 cm in diameter, (normal <2.1cm) with normal inspiratory collapse (normal >50%) consistent with normal right atrial pressure (~3, range 0-5mmHg).   6. No pericardial effusion seen.    < end of copied text >    Care Discussed with Consultants/Other Providers: yes, rehab

## 2024-11-01 NOTE — PROGRESS NOTE ADULT - ASSESSMENT
72 yo right handed zhou-speaking M with PMHX HTN, atrial fibrillation, former smoker x 34 years presented to [facility] on 10/17 with right ICA occlusion and right MCA infarct s/p thrombectomy TICI 3. Discharged from [facility] day before CVA for left pulmonary nodule s/p Left lung lobectomy s/p chest tube. Had been off blood thinners (10/2-10/16)  prior to surgery, then noted to by in AFIB on day of [facility] discharge so was loaded with amiodarone and restarted on Eliquis and Brilinta. Etiology of stroke cardio embolic due to Afib off AC due to recent surgery (10/2-10/16) vs hypercoagulability due to malignancy. Lung path showing adenocarcinoma, no hemo/onc workup started.  Admitted for multidisciplinary rehab program on 10/31.      #R MCA infarct s/p  thrombectomy TICI 3  - right ICA occlusion after the bifurcation extending to the supraclinoid ICA and right MCA, noted E/ICAD including L-ICA cavernous/supraclinoid segment mutlifocal stenosis, left VA mild to moderate stenosis. s/p thrombectomy 10/17  - Etiology of stroke cardio embolic due to Afib off AC due to recent surgery (10/2-10/16) vs hypercoagulability due to malignancy  - [facility] 10/31 Stable, no hemorrhage  - Continue Crestor 20 mg nightly   - Continue ASA 81 mg daily  - Continue Elliquis 5mg BID  - Fall and aspiration precautions  - Comprehensive Multidisciplinary Rehab Program: Gait, ADL, Functional impairments PT/OT/ SLP 3 hours a day 5 days a week    #PAF  #Noted bradycardia on 11/1  - Continue Amiodarone 200 mg daily  - Continue Metoprolol 25 mg BID  - ELIQUIS 5mg BID  - EKG on admission     #HTN/HLD  - Continue home metoprolol 25 mg BID  - Continue Crestor 20mg HS    #Lung Adenocarcinoma s/p VATS lobectomy on 10/8  #Small loculated L pleural effusion post-op  - Follow up with [facility] heme/onc already established  - No treatment while in patient    #Pancreatic cyst, incidental finding on CT CAP  - Follow up with PCP for Pancreatic protocol imaging every 2 years for 10 years.     #Mood / Cognition:  - Neuropsych evaluation     #Sleep:  - Maintain quiet hours and low stim environment  - Melatonin 6mg PRN    #Pain:  - Tylenol PRN  - avoid sedating meds that may affect cognitive recovery    #GI/Bowel:  - Senna 2 tabs daily  - Miralax prn  - GI ppx: None    #/Bladder:  - Monitor PVR if no void in 8h; SC for >400 cc  - Toileting schedule q4h    #Diet / Dysphagia:    - Diet: Minced and Moist, thin liquids  - ongoing SLP assessment  - Nutrition to follow    #Skin/ Pressure Injury Prevention:  - assessment on admission: Mid abd scar, L upper flank incision sites x 4 , dry skin generalized, R foot lateral callus   - Turn Q2hrs in bed while awake, OOB to Chair, PT/OT/SLP     #DVT prophylaxis:  -Eliquis 5mg BID  - SCDs  - Last doppler on: none    #Precautions/ Restrictions  - Falls,   - Lungs: Aspiration, Incentive Spirometer      --------------------------------------------  Outpatient Follow up:    Vitaly Dias MD  Internal medicine  [address] [place], [place] [id number] [phone]    Keke Lofton MD  [facility]  [address] [place] [address] [phone]  --------------------------------------------     74 yo right handed zhou-speaking M with PMHX HTN, atrial fibrillation, former smoker x 34 years presented to [facility] on 10/17 with right ICA occlusion and right MCA infarct s/p thrombectomy TICI 3. Discharged from [facility] day before CVA for left pulmonary nodule s/p Left lung lobectomy s/p chest tube. Had been off blood thinners (10/2-10/16)  prior to surgery, then noted to by in AFIB on day of [facility] discharge so was loaded with amiodarone and restarted on Eliquis and Brilinta. Etiology of stroke cardio embolic due to Afib off AC due to recent surgery (10/2-10/16) vs hypercoagulability due to malignancy. Lung path showing adenocarcinoma, no hemo/onc workup started.  Admitted for multidisciplinary rehab program on 10/31.      #R MCA infarct s/p  thrombectomy TICI 3  - right ICA occlusion after the bifurcation extending to the supraclinoid ICA and right MCA, noted E/ICAD including L-ICA cavernous/supraclinoid segment mutlifocal stenosis, left VA mild to moderate stenosis. s/p thrombectomy 10/17  - Etiology of stroke cardio embolic due to Afib off AC due to recent surgery (10/2-10/16) vs hypercoagulability due to malignancy  - [facility] 10/31 Stable, no hemorrhage  - Continue Crestor 20 mg nightly   - Continue ASA 81 mg daily  - Continue Elliquis 5mg BID  - Fall and aspiration precautions  - Comprehensive Multidisciplinary Rehab Program: Gait, ADL, Functional impairments PT/OT/ SLP 3 hours a day 5 days a week    #PAF  #Noted bradycardia on 11/1 - reduced metoprolol   - Continue Amiodarone 200 mg daily  - Continue Metoprolol 25 mg BID  - ELIQUIS 5mg BID  - EKG on admission     #HTN/HLD  - Continue home metoprolol 25 mg BID  - Continue Crestor 20mg HS    #Lung Adenocarcinoma s/p VATS lobectomy on 10/8  #Small loculated L pleural effusion post-op  - Follow up with [facility] heme/onc already established  - No treatment while in patient    #Pancreatic cyst, incidental finding on CT CAP  - Follow up with PCP for Pancreatic protocol imaging every 2 years for 10 years.     #Mood / Cognition:  - Neuropsych evaluation     #Sleep:  - Maintain quiet hours and low stim environment  - Melatonin 6mg PRN    #Pain:  - Tylenol PRN  - avoid sedating meds that may affect cognitive recovery    #GI/Bowel:  - Senna 2 tabs daily  - Miralax prn  - GI ppx: None    #/Bladder:  - Monitor PVR if no void in 8h; SC for >400 cc  - Toileting schedule q4h    #Diet / Dysphagia:    - Diet: Minced and Moist, thin liquids  - ongoing SLP assessment  - Nutrition to follow    #Skin/ Pressure Injury Prevention:  - assessment on admission: Mid abd scar, L upper flank incision sites x 4 , dry skin generalized, R foot lateral callus   - Turn Q2hrs in bed while awake, OOB to Chair, PT/OT/SLP     #DVT prophylaxis:  -Eliquis 5mg BID  - SCDs  - Last doppler on: none    #Precautions/ Restrictions  - Falls,   - Lungs: Aspiration, Incentive Spirometer      --------------------------------------------  Outpatient Follow up:    Vitaly Dias MD  Internal medicine  [address] [place], [place] [id number] [phone]    Keke Lofton MD  [facility]  [address] [place], [place] [id number] [phone]  --------------------------------------------     74 yo right handed zhou-speaking M with PMHX HTN, atrial fibrillation, former smoker x 34 years presented to [facility] on 10/17 with right ICA occlusion and right MCA infarct s/p thrombectomy TICI 3. Discharged from [facility] day before CVA for left pulmonary nodule s/p Left lung lobectomy s/p chest tube. Had been off blood thinners (10/2-10/16)  prior to surgery, then noted to by in AFIB on day of [facility] discharge so was loaded with amiodarone and restarted on Eliquis and Brilinta. Etiology of stroke cardio embolic due to Afib off AC due to recent surgery (10/2-10/16) vs hypercoagulability due to malignancy. Lung path showing adenocarcinoma, no hemo/onc workup started.  Admitted for multidisciplinary rehab program on 10/31.      #R MCA infarct s/p  thrombectomy TICI 3  - right ICA occlusion after the bifurcation extending to the supraclinoid ICA and right MCA, noted E/ICAD including L-ICA cavernous/supraclinoid segment mutlifocal stenosis, left VA mild to moderate stenosis. s/p thrombectomy 10/17  - Etiology of stroke cardio embolic due to Afib off AC due to recent surgery (10/2-10/16) vs hypercoagulability due to malignancy  - [facility] 10/31 Stable, no hemorrhage  - Continue Crestor 20 mg nightly   - Continue ASA 81 mg daily  - Continue Elliquis 5mg BID  - Fall and aspiration precautions  - Comprehensive Multidisciplinary Rehab Program: Gait, ADL, Functional impairments PT/OT/ SLP 3 hours a day 5 days a week    #Left facial twitching suspect for partial seizures 11/1  - Ativan 1 mg IV push   - Keppra 1 g IV load and 500 mg BID orally   - CT head urgent   - EEG urgent   - Prolactin, lactate      #PAF  #Noted bradycardia on 11/1 - reduced metoprolol   - Continue Amiodarone 200 mg daily  - Continue Metoprolol 25 mg BID  - ELIQUIS 5mg BID  - EKG on admission     #HTN/HLD  - Continue home metoprolol 25 mg BID  - Continue Crestor 20mg HS    #Lung Adenocarcinoma s/p VATS lobectomy on 10/8  #Small loculated L pleural effusion post-op  - Follow up with [facility] heme/onc already established  - No treatment while in patient    #Pancreatic cyst, incidental finding on CT CAP  - Follow up with PCP for Pancreatic protocol imaging every 2 years for 10 years.     #Mood / Cognition:  - Neuropsych evaluation     #Sleep:  - Maintain quiet hours and low stim environment  - Melatonin 6mg PRN    #Pain:  - Tylenol PRN  - avoid sedating meds that may affect cognitive recovery    #GI/Bowel:  - Senna 2 tabs daily  - Miralax prn  - GI ppx: None    #/Bladder:  - Monitor PVR if no void in 8h; SC for >400 cc  - Toileting schedule q4h    #Diet / Dysphagia:    - Diet: Minced and Moist, thin liquids  - ongoing SLP assessment  - Nutrition to follow    #Skin/ Pressure Injury Prevention:  - assessment on admission: Mid abd scar, L upper flank incision sites x 4 , dry skin generalized, R foot lateral callus   - Turn Q2hrs in bed while awake, OOB to Chair, PT/OT/SLP     #DVT prophylaxis:  -Eliquis 5mg BID  - SCDs  - Last doppler on: none    #Precautions/ Restrictions  - Falls,   - Lungs: Aspiration, Incentive Spirometer      --------------------------------------------  Outpatient Follow up:    Vitaly Dias MD  Internal medicine  [address] [place] [address] [phone]    Keke Lofton MD  [facility]  [address] [place] [address]  [phone]  --------------------------------------------

## 2024-11-01 NOTE — DIETITIAN INITIAL EVALUATION ADULT - ORAL INTAKE PTA/DIET HISTORY
[employer] used for Mandarin/[place] dialect translation, ID# [id number]. Pt reports decreased appetite since hospitalization- states that foods taste too sweet/dislikes hospital foods. Noted to prefer cultural Chinese foods from home (family was bringing in 2 meals/day during acute hospitalization per previous RD note). Appetite PTA fair/good, states that his wife prepares all meals at home- prefers hot foods. NKFA, although pt reports avoiding dairy due to suspected lactose intolerance.

## 2024-11-01 NOTE — CHART NOTE - NSCHARTNOTEFT_GEN_A_CORE
Hospitalist Attending RRT Note    CC: RRT called for pt with left facial twitching    HPI: Patient is a 73y old Male with PMH HTN, afib, right ICA occlusion and right MCA infarct s/p thrombectomy TICI 3, adenocarinoma of the lung admitted to acute rehab. RRT called for left facial twitching. Patient AAOx2-3, mentation unchanged from AM. denies headache, fever, chills, cp, sob, n/v, abd pain.      VITALS:  T(C): 36.7 (11-01-24 @ 07:13), Max: 37 (10-31-24 @ 17:08)  HR: 49 (11-01-24 @ 07:13) (46 - 63)  BP: 124/74 (11-01-24 @ 07:13) (116/64 - 126/75)  RR: 16 (11-01-24 @ 07:13) (16 - 16)  SpO2: 99% (11-01-24 @ 07:13) (96% - 99%)  Wt(kg): --    PHYSICAL EXAM:  General: NAD, well-groomed, well-developed.  Eyes: PERRLA, EOMI. Conjunctiva and sclera clear.  Lungs: Airway patent. CTA B/L, diminished left lung. Normal breath sounds. No wheezes, rales, rhonchi.  Heart: RRR. S1S2  Abdomen: Soft, NT/ND. Normoactive BS x 4 quadrants.  Neurological:  AAOx2-3. Good concentration. RUE strength 4/5,  0/5. LUE, b/l LE strength 4/5, unchanged from AM exam  Extremities:  2+ B/L peripheral pulses. No clubbing, cyanosis, or edema.    LABS:  CAPILLARY BLOOD GLUCOSE      POCT Blood Glucose.: 119 mg/dL (01 Nov 2024 13:02)                          11.5   5.17  )-----------( 199      ( 01 Nov 2024 07:07 )             33.7     11-01    141  |  107  |  23  ----------------------------<  109[H]  4.1   |  29  |  1.15    Ca    9.2      01 Nov 2024 07:07    TPro  5.9[L]  /  Alb  2.7[L]  /  TBili  0.5  /  DBili  x   /  AST  62[H]  /  ALT  122[H]  /  AlkPhos  81  11-01          LIVER FUNCTIONS - ( 01 Nov 2024 07:07 )  Alb: 2.7 g/dL / Pro: 5.9 g/dL / ALK PHOS: 81 U/L / ALT: 122 U/L / AST: 62 U/L / GGT: x                                     ASSESSMENT:  Patient is a 72 y/o M with PMH HTN, afib, right ICA occlusion and right MCA infarct s/p thrombectomy TICI 3, adenocarinoma of the lung admitted to acute rehab. RRT called for left facial twitching    PLAN:  -Discussed with primary rehab team  -Stat CTH  -EEG  -Ativan per rehab  -POCT glucose 119  -Will continue to follow up Hospitalist Attending RRT Note    CC: RRT called for pt with left facial twitching    HPI: Patient is a 73y old Male with PMH HTN, afib, right ICA occlusion and right MCA infarct s/p thrombectomy TICI 3, adenocarinoma of the lung admitted to acute rehab. RRT called for left facial twitching. Patient AAOx2-3, mentation unchanged from AM. denies headache, fever, chills, cp, sob, n/v, abd pain.      VITALS:  /76, HR 52, RR 16, O2 99% on RA    PHYSICAL EXAM:  General: NAD, well-groomed, well-developed.  Eyes: PERRLA, EOMI. Conjunctiva and sclera clear.  Lungs: Airway patent. CTA B/L, diminished left lung. Normal breath sounds. No wheezes, rales, rhonchi.  Heart: RRR. S1S2  Abdomen: Soft, NT/ND. Normoactive BS x 4 quadrants.  Neurological:  AAOx2-3. Good concentration. RUE strength 4/5,  0/5. LUE, b/l LE strength 4/5, unchanged from AM exam  Extremities:  2+ B/L peripheral pulses. No clubbing, cyanosis, or edema.    LABS:  CAPILLARY BLOOD GLUCOSE      POCT Blood Glucose.: 119 mg/dL (01 Nov 2024 13:02)                          11.5   5.17  )-----------( 199      ( 01 Nov 2024 07:07 )             33.7     11-01    141  |  107  |  23  ----------------------------<  109[H]  4.1   |  29  |  1.15    Ca    9.2      01 Nov 2024 07:07    TPro  5.9[L]  /  Alb  2.7[L]  /  TBili  0.5  /  DBili  x   /  AST  62[H]  /  ALT  122[H]  /  AlkPhos  81  11-01          LIVER FUNCTIONS - ( 01 Nov 2024 07:07 )  Alb: 2.7 g/dL / Pro: 5.9 g/dL / ALK PHOS: 81 U/L / ALT: 122 U/L / AST: 62 U/L / GGT: x                                     ASSESSMENT:  Patient is a 72 y/o M with PMH HTN, afib, right ICA occlusion and right MCA infarct s/p thrombectomy TICI 3, adenocarinoma of the lung admitted to acute rehab. RRT called for left facial twitching    PLAN:  -Discussed with primary rehab team  -Stat CTH, prolactin   -EEG  -Ativan, keppra per rehab  -POCT glucose 119  -Will continue to follow up

## 2024-11-01 NOTE — CONSULT NOTE ADULT - ASSESSMENT
74 y/o M, [facility]-speaking, with PMH HTN, atrial fibrillation, former smoker x 34 years presented to [facility] on 10/17 with right ICA occlusion and right MCA infarct s/p thrombectomy TICI 3. Discharged from [facility] day before CVA for left pulmonary nodule s/p Left lung lobectomy s/p chest tube. Had been off blood thinners (10/2-10/16)  prior to surgery, then noted to by in AFIB on day of [facility] discharge so was loaded with amiodarone and restarted on Eliquis and Brilinta. Etiology of stroke cardio embolic due to Afib off AC due to recent surgery (10/2-10/16) vs hypercoagulability due to malignancy. Lung path showing adenocarcinoma, no hemo/onc workup started.  Admitted for multidisciplinary rehab program on 10/31.      #R MCA infarct s/p  thrombectomy TICI 3  -Right ICA occlusion after the bifurcation extending to the supraclinoid ICA and right MCA, noted E/ICAD including L-ICA cavernous/supraclinoid segment mutlifocal stenosis, left VA mild to moderate stenosis. s/p thrombectomy 10/17  -Etiology of stroke cardio embolic due to Afib off AC due to recent surgery (10/2-10/16) vs hypercoagulability due to malignancy  -[facility] 10/31 stable, no hemorrhage  -Continue Crestor 20mg qhs   -Continue ASA 81 mg daily  -Continue Elliquis 5mg BID  -Fall and aspiration precuations  -Start comprehensive rehab program - PT/OT/SLP per rehab team  -Pain management, bowel regimen per rehab     #PAF  -Continue Amiodarone 200mg daily  -Continue Metoprolol 25mg BID  -Continue Eliquis 5mg BID  -EKG on admission     #HTN/HLD  -Continue home metoprolol 25mg BID  -Continue Crestor 20mg qhs    #Lung Adenocarcinoma s/p VATS lobectomy on 10/8  #Small loculated L pleural effusion post-op  -Follow up with [facility] heme/onc already established  -No treatment while in patient    #Pancreatic cyst, incidental finding on CT CAP  -Follow up with PCP for Pancreatic protocol imaging every 2 years for 10 years    #DVT prophylaxis  -Eliquis 5mg BID  -SCDs 74 y/o M, [facility]-speaking, with PMH HTN, atrial fibrillation, former smoker x 34 years presented to [facility] on 10/17 with right ICA occlusion and right MCA infarct s/p thrombectomy TICI 3. Discharged from [facility] day before CVA for left pulmonary nodule s/p Left lung lobectomy s/p chest tube. Had been off blood thinners (10/2-10/16)  prior to surgery, then noted to by in AFIB on day of [facility] discharge so was loaded with amiodarone and restarted on Eliquis and Brilinta. Etiology of stroke cardio embolic due to Afib off AC due to recent surgery (10/2-10/16) vs hypercoagulability due to malignancy. Lung path showing adenocarcinoma, no hemo/onc workup started.  Admitted for multidisciplinary rehab program on 10/31.    #R MCA infarct s/p  thrombectomy TICI 3  -Right ICA occlusion after the bifurcation extending to the supraclinoid ICA and right MCA, noted E/ICAD including L-ICA cavernous/supraclinoid segment mutlifocal stenosis, left VA mild to moderate stenosis. s/p thrombectomy 10/17  -Etiology of stroke cardio embolic due to Afib off AC due to recent surgery (10/2-10/16) vs hypercoagulability due to malignancy  -[facility] 10/31 stable, no hemorrhage  -Continue Crestor 20mg qhs, monitor LFTs  -Continue ASA 81 mg daily  -Continue Elliquis 5mg BID  -Fall and aspiration precuations  -Start comprehensive rehab program - PT/OT/SLP per rehab team  -Pain management, bowel regimen per rehab     #PAF  -Continue Amiodarone 200mg daily  -Continue Metoprolol 25mg BID  -Continue Eliquis 5mg BID     #HTN/HLD  -Continue home metoprolol 25mg BID  -Continue Crestor 20mg qhs    #Lung Adenocarcinoma s/p VATS lobectomy on 10/8  #Small loculated L pleural effusion post-op  -Follow up with [facility] heme/onc already established  -No treatment while in patient    #Pancreatic cyst, incidental finding on CT CAP  -Follow up with PCP for Pancreatic protocol imaging every 2 years for 10 years    #DVT prophylaxis  -Eliquis 5mg BID  -SCDs    #GI ppx - PPI 72 y/o M, [facility]-speaking, with PMH HTN, atrial fibrillation, former smoker x 34 years presented to [facility] on 10/17 with right ICA occlusion and right MCA infarct s/p thrombectomy TICI 3. Discharged from [facility] day before CVA for left pulmonary nodule s/p Left lung lobectomy s/p chest tube. Had been off blood thinners (10/2-10/16)  prior to surgery, then noted to by in AFIB on day of [facility] discharge so was loaded with amiodarone and restarted on Eliquis and Brilinta. Etiology of stroke cardio embolic due to Afib off AC due to recent surgery (10/2-10/16) vs hypercoagulability due to malignancy. Lung path showing adenocarcinoma, no hemo/onc workup started.  Admitted for multidisciplinary rehab program on 10/31.    #R MCA infarct s/p  thrombectomy TICI 3  -Right ICA occlusion after the bifurcation extending to the supraclinoid ICA and right MCA, noted E/ICAD including L-ICA cavernous/supraclinoid segment mutlifocal stenosis, left VA mild to moderate stenosis. s/p thrombectomy 10/17  -Etiology of stroke cardio embolic due to Afib off AC due to recent surgery (10/2-10/16) vs hypercoagulability due to malignancy  -[facility] 10/31 stable, no hemorrhage  -Continue Crestor 20mg qhs, monitor LFTs  -Continue ASA 81 mg daily  -Continue Elliquis 5mg BID  -Fall and aspiration precuations  -Start comprehensive rehab program - PT/OT/SLP per rehab team  -Pain management, bowel regimen per rehab     #PAF  -Continue Amiodarone 200mg daily  -Continue Metoprolol 25mg BID - noted bradycardia, asymptomatic. decrease to 12.5mg BID  -Continue Eliquis 5mg BID     #HTN/HLD  -Continue home metoprolol 25mg BID  -Continue Crestor 20mg qhs    #Lung Adenocarcinoma s/p VATS lobectomy on 10/8  #Small loculated L pleural effusion post-op  -Follow up with [facility] heme/onc already established  -No treatment while in patient    #Pancreatic cyst, incidental finding on CT CAP  -Follow up with PCP for Pancreatic protocol imaging every 2 years for 10 years    #DVT prophylaxis  -Eliquis 5mg BID  -SCDs    #GI ppx - PPI

## 2024-11-01 NOTE — DIETITIAN INITIAL EVALUATION ADULT - PERTINENT MEDS FT
MEDICATIONS  (STANDING):  aMIOdarone    Tablet 200 milliGRAM(s) Oral daily  apixaban 5 milliGRAM(s) Oral two times a day  aspirin  chewable 81 milliGRAM(s) Oral daily  melatonin 6 milliGRAM(s) Oral at bedtime  metoprolol tartrate 25 milliGRAM(s) Oral every 12 hours  rosuvastatin 20 milliGRAM(s) Oral at bedtime  senna 2 Tablet(s) Oral at bedtime    MEDICATIONS  (PRN):  acetaminophen     Tablet .. 650 milliGRAM(s) Oral every 6 hours PRN Temp greater or equal to 38C (100.4F), Mild Pain (1 - 3)  polyethylene glycol 3350 17 Gram(s) Oral at bedtime PRN Constipation

## 2024-11-01 NOTE — PROVIDER CONTACT NOTE (OTHER) - ACTION/TREATMENT ORDERED:
MD called RRT, VSS, labs drawn, ativan piggyback given, also Keppra IV 1000mg given. EEG ordered and CT head to be completed.

## 2024-11-02 LAB — PROLACTIN SERPL-MCNC: 13.4 NG/ML — SIGNIFICANT CHANGE UP (ref 4.1–18.4)

## 2024-11-02 PROCEDURE — 99232 SBSQ HOSP IP/OBS MODERATE 35: CPT

## 2024-11-02 RX ADMIN — LEVETIRACETAM 500 MILLIGRAM(S): 1000 TABLET ORAL at 06:10

## 2024-11-02 RX ADMIN — LEVETIRACETAM 500 MILLIGRAM(S): 1000 TABLET ORAL at 18:22

## 2024-11-02 RX ADMIN — Medication 81 MILLIGRAM(S): at 12:09

## 2024-11-02 RX ADMIN — Medication 2 TABLET(S): at 21:42

## 2024-11-02 RX ADMIN — ROSUVASTATIN CALCIUM 20 MILLIGRAM(S): 5 TABLET, FILM COATED ORAL at 21:42

## 2024-11-02 RX ADMIN — APIXABAN 5 MILLIGRAM(S): 2.5 TABLET, FILM COATED ORAL at 06:10

## 2024-11-02 RX ADMIN — AMIODARONE HYDROCHLORIDE 200 MILLIGRAM(S): 200 TABLET ORAL at 06:10

## 2024-11-02 RX ADMIN — APIXABAN 5 MILLIGRAM(S): 2.5 TABLET, FILM COATED ORAL at 18:22

## 2024-11-02 RX ADMIN — PANTOPRAZOLE SODIUM 40 MILLIGRAM(S): 40 TABLET, DELAYED RELEASE ORAL at 06:10

## 2024-11-02 RX ADMIN — ACETAMINOPHEN, DIPHENHYDRAMINE HCL, PHENYLEPHRINE HCL 6 MILLIGRAM(S): 325; 25; 5 TABLET ORAL at 21:42

## 2024-11-02 NOTE — PROGRESS NOTE ADULT - SUBJECTIVE AND OBJECTIVE BOX
Patient seen and examined at bedside. no complaints.      ALLERGIES:  No Known Allergies    MEDICATIONS  (STANDING):  aMIOdarone    Tablet 200 milliGRAM(s) Oral daily  apixaban 5 milliGRAM(s) Oral two times a day  aspirin  chewable 81 milliGRAM(s) Oral daily  levETIRAcetam 500 milliGRAM(s) Oral two times a day  melatonin 6 milliGRAM(s) Oral at bedtime  pantoprazole    Tablet 40 milliGRAM(s) Oral before breakfast  rosuvastatin 20 milliGRAM(s) Oral at bedtime  senna 2 Tablet(s) Oral at bedtime    MEDICATIONS  (PRN):  acetaminophen     Tablet .. 650 milliGRAM(s) Oral every 6 hours PRN Temp greater or equal to 38C (100.4F), Mild Pain (1 - 3)  polyethylene glycol 3350 17 Gram(s) Oral at bedtime PRN Constipation    Vital Signs Last 24 Hrs  T(F): 97.5 (02 Nov 2024 07:57), Max: 97.8 (01 Nov 2024 20:55)  HR: 52 (02 Nov 2024 07:57) (49 - 52)  BP: 132/72 (02 Nov 2024 07:57) (113/64 - 147/73)  RR: 16 (02 Nov 2024 07:57) (16 - 16)  SpO2: 97% (02 Nov 2024 07:57) (95% - 99%)  I&O's Summary    BMI (kg/m2): 24.2 (10-31-24 @ 17:08)  PHYSICAL EXAM:    Gen: nad, resting in chair  Neuro: aaox3, no focal deficits  Heent: eomi b/l, no jvd, no oral exudates  Pulm: cta b/l, no w/r/r  CV: +s1s2, no m/r/g  Ab: soft, nt/nd, normoactive bs x 4  Extrem: no edema, pulses intact and equal  Skin: no rashes  Psych: normal    LABS:                        11.5   5.17  )-----------( 199      ( 01 Nov 2024 07:07 )             33.7       11-01    141  |  107  |  23  ----------------------------<  109  4.1   |  29  |  1.15    Ca    9.2      01 Nov 2024 07:07    TPro  5.9  /  Alb  2.7  /  TBili  0.5  /  DBili  x   /  AST  62  /  ALT  122  /  AlkPhos  81  11-01          Lactate, Blood: 1.0 mmol/L (11-01 @ 13:10)    POCT Blood Glucose.: 119 mg/dL (01 Nov 2024 13:02)      Urinalysis Basic - ( 01 Nov 2024 07:07 )    Color: x / Appearance: x / SG: x / pH: x  Gluc: 109 mg/dL / Ketone: x  / Bili: x / Urobili: x   Blood: x / Protein: x / Nitrite: x   Leuk Esterase: x / RBC: x / WBC x   Sq Epi: x / Non Sq Epi: x / Bacteria: x        COVID-19 PCR: NotDetec (10-31-24 @ 17:56)      RADIOLOGY & ADDITIONAL TESTS:    Care Discussed with Consultants/Other Providers:

## 2024-11-02 NOTE — PROGRESS NOTE ADULT - SUBJECTIVE AND OBJECTIVE BOX
Chief complaint: no new complaints, no acute events overnight     Patient is a 73y old  Male who presents with a chief complaint of R-MCA CVA (02 Nov 2024 12:23)      PAST MEDICAL & SURGICAL HISTORY:  CAD (coronary artery disease)      Hypertension      Solitary pulmonary nodule      Current smoker      Stroke      Left hydrocele      Afib      Adenocarcinoma, lung      H/O abdominal surgery      History of percutaneous coronary intervention      S/P cataract surgery      S/P lobectomy of lung      VITALS  Vital Signs Last 24 Hrs  T(C): 36.4 (02 Nov 2024 07:57), Max: 36.6 (01 Nov 2024 20:55)  T(F): 97.5 (02 Nov 2024 07:57), Max: 97.8 (01 Nov 2024 20:55)  HR: 52 (02 Nov 2024 07:57) (49 - 52)  BP: 132/72 (02 Nov 2024 07:57) (113/64 - 147/73)  BP(mean): --  RR: 16 (02 Nov 2024 07:57) (16 - 16)  SpO2: 97% (02 Nov 2024 07:57) (95% - 99%)    Parameters below as of 02 Nov 2024 07:57  Patient On (Oxygen Delivery Method): room air          PHYSICAL EXAM  Constitutional - NAD, Comfortable  HEENT - NCAT, EOMI  Neck - Supple, No limited ROM  Chest - CTA bilaterally  Cardiovascular - RRR, S1S2  Abdomen -  Soft, NTND  Extremities -  No calf tenderness   Neurologic Exam -                    Cognitive - Awake, Alert     No new focal deficits                  RECENT LABS                        11.5   5.17  )-----------( 199      ( 01 Nov 2024 07:07 )             33.7     11-01    141  |  107  |  23  ----------------------------<  109[H]  4.1   |  29  |  1.15    Ca    9.2      01 Nov 2024 07:07    TPro  5.9[L]  /  Alb  2.7[L]  /  TBili  0.5  /  DBili  x   /  AST  62[H]  /  ALT  122[H]  /  AlkPhos  81  11-01      Urinalysis Basic - ( 01 Nov 2024 07:07 )    Color: x / Appearance: x / SG: x / pH: x  Gluc: 109 mg/dL / Ketone: x  / Bili: x / Urobili: x   Blood: x / Protein: x / Nitrite: x   Leuk Esterase: x / RBC: x / WBC x   Sq Epi: x / Non Sq Epi: x / Bacteria: x          RADIOLOGY/OTHER RESULTS      ACC: [id number] EXAM:  CT BRAIN   ORDERED BY:  ALVERTO JULES     PROCEDURE DATE:  11/01/2024          INTERPRETATION:  CLINICAL INFORMATION: partial seizures cva r mca    r/o   bleeding      5mm axial sections of the brain were obtained from base to vertex,   without the intravenous administration of contrast material. Coronal and   sagittal computer generated reconstructed views are available.    Comparison is made with prior CT of 10/31/2024 and demonstrates no   significant interval change.    There is lucency in the right frontal temporal cortex and right basal   ganglia consistent with a right middle cerebral artery infarct. There is   no hemorrhage. Age-appropriate involutional and ischemic gliotic changes   are noted. There hasbeen previous bilateral lens replacement surgery.    IMPRESSION: Lucency right basal ganglia and right frontal temporal cortex   consistent with a subacute right middle cerebral artery infarct unchanged   since 10/31/2024. No hemorrhage.            CURRENT MEDICATIONS    MEDICATIONS  (STANDING):  aMIOdarone    Tablet 200 milliGRAM(s) Oral daily  apixaban 5 milliGRAM(s) Oral two times a day  aspirin  chewable 81 milliGRAM(s) Oral daily  levETIRAcetam 500 milliGRAM(s) Oral two times a day  melatonin 6 milliGRAM(s) Oral at bedtime  pantoprazole    Tablet 40 milliGRAM(s) Oral before breakfast  rosuvastatin 20 milliGRAM(s) Oral at bedtime  senna 2 Tablet(s) Oral at bedtime    MEDICATIONS  (PRN):  acetaminophen     Tablet .. 650 milliGRAM(s) Oral every 6 hours PRN Temp greater or equal to 38C (100.4F), Mild Pain (1 - 3)  polyethylene glycol 3350 17 Gram(s) Oral at bedtime PRN Constipation    ASSESSMENT & PLAN          GI/Bowel Management    Management   Skin - Turn Q2  Pain control   DVT PPX - Apixaban  Head CT - no acute changes, EEG is negative  Seizure ppx - [facility]   Hospitalist is following       Continue comprehensive acute rehab program consisting of 3hrs/day of OT/PT and SLP.

## 2024-11-02 NOTE — PROGRESS NOTE ADULT - ASSESSMENT
74 y/o M, who is [facility] speaking with PMH of HTN, atrial fibrillation (on eliquis), former smoker x 34 years presented to [facility] on 10/17 with right ICA occlusion and right MCA infarct s/p thrombectomy TICI 3. Discharged from [facility] day before CVA for left pulmonary nodule s/p Left lung lobectomy s/p chest tube. Had been off blood thinners (10/2-10/16)  prior to surgery, then noted to by in AFIB on day of [facility] discharge so was loaded with amiodarone and restarted on Eliquis and Brilinta. Etiology of stroke cardio embolic due to Afib off AC due to recent surgery (10/2-10/16) vs hypercoagulability due to malignancy. Lung path showing adenocarcinoma, no hemo/onc workup started.  Admitted for multidisciplinary rehab program on 10/31.    #R MCA infarct s/p  thrombectomy TICI 3  -Right ICA occlusion after the bifurcation extending to the supraclinoid ICA and right MCA, noted E/ICAD including L-ICA cavernous/supraclinoid segment mutlifocal stenosis, left VA mild to moderate stenosis. s/p thrombectomy 10/17  -Etiology of stroke cardio embolic due to Afib off AC due to recent surgery (10/2-10/16) vs hypercoagulability due to malignancy  -[facility] 10/31 stable, no hemorrhage  -Continue Crestor 20mg qhs, monitor LFTs  -Continue ASA 81 mg daily  -Continue Elliquis 5mg BID  -Fall and aspiration precuations  -Comprehensive rehab program  -Pain management, bowel regimen per rehab     #PAF  #bradycardia  -Continue Amiodarone 200mg daily  -will discontinue metoprolol given bradycardia  -Continue Eliquis 5mg BID  -repeat EKG in AM  -check TFT for amio monitoring     #HTN/HLD  -monitor bp off toprol  -Continue Crestor 20mg qhs    #Lung Adenocarcinoma s/p VATS lobectomy on 10/8  #Small loculated L pleural effusion post-op  -Follow up with [facility] heme/onc already established  -No treatment while in patient    #Pancreatic cyst, incidental finding on CT CAP  -Follow up with PCP for Pancreatic protocol imaging every 2 years for 10 years    #DVT prophylaxis  -Eliquis 5mg BID  -SCDs    #GI ppx - PPI

## 2024-11-03 LAB
T4 FREE SERPL-MCNC: 2.1 NG/DL — HIGH (ref 0.9–1.8)
TSH SERPL-MCNC: 1.83 UIU/ML — SIGNIFICANT CHANGE UP (ref 0.36–3.74)

## 2024-11-03 PROCEDURE — 93010 ELECTROCARDIOGRAM REPORT: CPT

## 2024-11-03 PROCEDURE — 99232 SBSQ HOSP IP/OBS MODERATE 35: CPT

## 2024-11-03 RX ADMIN — ROSUVASTATIN CALCIUM 20 MILLIGRAM(S): 5 TABLET, FILM COATED ORAL at 20:06

## 2024-11-03 RX ADMIN — AMIODARONE HYDROCHLORIDE 200 MILLIGRAM(S): 200 TABLET ORAL at 05:27

## 2024-11-03 RX ADMIN — APIXABAN 5 MILLIGRAM(S): 2.5 TABLET, FILM COATED ORAL at 05:10

## 2024-11-03 RX ADMIN — Medication 81 MILLIGRAM(S): at 12:31

## 2024-11-03 RX ADMIN — LEVETIRACETAM 500 MILLIGRAM(S): 1000 TABLET ORAL at 05:10

## 2024-11-03 RX ADMIN — LEVETIRACETAM 500 MILLIGRAM(S): 1000 TABLET ORAL at 17:31

## 2024-11-03 RX ADMIN — PANTOPRAZOLE SODIUM 40 MILLIGRAM(S): 40 TABLET, DELAYED RELEASE ORAL at 05:27

## 2024-11-03 RX ADMIN — ACETAMINOPHEN, DIPHENHYDRAMINE HCL, PHENYLEPHRINE HCL 6 MILLIGRAM(S): 325; 25; 5 TABLET ORAL at 20:06

## 2024-11-03 RX ADMIN — APIXABAN 5 MILLIGRAM(S): 2.5 TABLET, FILM COATED ORAL at 17:31

## 2024-11-03 NOTE — PROGRESS NOTE ADULT - SUBJECTIVE AND OBJECTIVE BOX
Chief complaint: no new complaints, no acute events overnight     Patient is a 73y old  Male who presents with a chief complaint of R-MCA CVA (02 Nov 2024 13:05)    PAST MEDICAL & SURGICAL HISTORY:  CAD (coronary artery disease)      Hypertension      Solitary pulmonary nodule      Current smoker      Stroke      Left hydrocele      Afib      Adenocarcinoma, lung      H/O abdominal surgery      History of percutaneous coronary intervention      S/P cataract surgery      S/P lobectomy of lung    VITALS  Vital Signs Last 24 Hrs  T(C): 36.8 (03 Nov 2024 07:53), Max: 36.8 (03 Nov 2024 07:53)  T(F): 98.3 (03 Nov 2024 07:53), Max: 98.3 (03 Nov 2024 07:53)  HR: 53 (03 Nov 2024 07:53) (52 - 60)  BP: 129/69 (03 Nov 2024 07:53) (127/62 - 145/68)  BP(mean): --  RR: 16 (03 Nov 2024 07:53) (16 - 16)  SpO2: 99% (03 Nov 2024 07:53) (99% - 99%)    Parameters below as of 03 Nov 2024 07:53  Patient On (Oxygen Delivery Method): room air          PHYSICAL EXAM  Constitutional - NAD, Comfortable  HEENT - NCAT, EOMI  Neck - Supple, No limited ROM  Chest - CTA bilaterally  Cardiovascular - RRR, S1S2  Abdomen -  Soft, NTND  Extremities -  No calf tenderness   Neurologic Exam -                    Cognitive - Awake, Alert     No new focal deficits                CURRENT MEDICATIONS    MEDICATIONS  (STANDING):  aMIOdarone    Tablet 200 milliGRAM(s) Oral daily  apixaban 5 milliGRAM(s) Oral two times a day  aspirin  chewable 81 milliGRAM(s) Oral daily  levETIRAcetam 500 milliGRAM(s) Oral two times a day  melatonin 6 milliGRAM(s) Oral at bedtime  pantoprazole    Tablet 40 milliGRAM(s) Oral before breakfast  rosuvastatin 20 milliGRAM(s) Oral at bedtime  senna 2 Tablet(s) Oral at bedtime    MEDICATIONS  (PRN):  acetaminophen     Tablet .. 650 milliGRAM(s) Oral every 6 hours PRN Temp greater or equal to 38C (100.4F), Mild Pain (1 - 3)  polyethylene glycol 3350 17 Gram(s) Oral at bedtime PRN Constipation    ASSESSMENT & PLAN          GI/Bowel Management   / Bladder Management   Skin - Turn Q2  Pain control as needed  DVT PPX - Apixaban   Hospitalist is following       Continue comprehensive acute rehab program consisting of 3hrs/day of OT/PT and SLP.

## 2024-11-03 NOTE — PROGRESS NOTE ADULT - ASSESSMENT
72 y/o M, who is [facility] speaking with PMH of HTN, atrial fibrillation (on eliquis), former smoker x 34 years presented to [facility] on 10/17 with right ICA occlusion and right MCA infarct s/p thrombectomy TICI 3. Discharged from [facility] day before CVA for left pulmonary nodule s/p Left lung lobectomy s/p chest tube. Had been off blood thinners (10/2-10/16)  prior to surgery, then noted to by in AFIB on day of [facility] discharge so was loaded with amiodarone and restarted on Eliquis and Brilinta. Etiology of stroke cardio embolic due to Afib off AC due to recent surgery (10/2-10/16) vs hypercoagulability due to malignancy. Lung path showing adenocarcinoma, no hemo/onc workup started.  Admitted for multidisciplinary rehab program on 10/31.    #R MCA infarct s/p  thrombectomy TICI 3  -Right ICA occlusion after the bifurcation extending to the supraclinoid ICA and right MCA, noted E/ICAD including L-ICA cavernous/supraclinoid segment mutlifocal stenosis, left VA mild to moderate stenosis. s/p thrombectomy 10/17  -Etiology of stroke cardio embolic due to Afib off AC due to recent surgery (10/2-10/16) vs hypercoagulability due to malignancy  -CTH 10/31 stable, no hemorrhage  -Continue Crestor 20mg qhs, monitor LFTs  -Continue ASA 81 mg daily  -Continue Elliquis 5mg BID  -Fall and aspiration precuations  -Comprehensive rehab program  -Pain management, bowel regimen per rehab     #PAF  #bradycardia  -Continue Amiodarone 200mg daily  -continue to monitor off metoprolol  -Continue Eliquis 5mg BID  -repeat EKG with improving rate, still phoebe, asymptomatic  -TFT and FT4 for amiodarone monitoring showed normal TSH, mildly elevated FT4. repeat thyroid studies in 1 week     #HTN/HLD  -monitor bp off toprol  -Continue Crestor 20mg qhs    #Lung Adenocarcinoma s/p VATS lobectomy on 10/8  #Small loculated L pleural effusion post-op  -Follow up with [facility] heme/onc already established  -No treatment while in patient    #Pancreatic cyst, incidental finding on CT CAP  -Follow up with PCP for Pancreatic protocol imaging every 2 years for 10 years    #DVT prophylaxis  -Eliquis 5mg BID  -SCDs    #GI ppx - PPI

## 2024-11-03 NOTE — PROGRESS NOTE ADULT - SUBJECTIVE AND OBJECTIVE BOX
Patient seen and examined in chair      ALLERGIES:  No Known Allergies    MEDICATIONS  (STANDING):  aMIOdarone    Tablet 200 milliGRAM(s) Oral daily  apixaban 5 milliGRAM(s) Oral two times a day  aspirin  chewable 81 milliGRAM(s) Oral daily  levETIRAcetam 500 milliGRAM(s) Oral two times a day  melatonin 6 milliGRAM(s) Oral at bedtime  pantoprazole    Tablet 40 milliGRAM(s) Oral before breakfast  rosuvastatin 20 milliGRAM(s) Oral at bedtime  senna 2 Tablet(s) Oral at bedtime    MEDICATIONS  (PRN):  acetaminophen     Tablet .. 650 milliGRAM(s) Oral every 6 hours PRN Temp greater or equal to 38C (100.4F), Mild Pain (1 - 3)  polyethylene glycol 3350 17 Gram(s) Oral at bedtime PRN Constipation    Vital Signs Last 24 Hrs  T(F): 98.3 (03 Nov 2024 07:53), Max: 98.3 (03 Nov 2024 07:53)  HR: 53 (03 Nov 2024 07:53) (52 - 60)  BP: 129/69 (03 Nov 2024 07:53) (127/62 - 145/68)  RR: 16 (03 Nov 2024 07:53) (16 - 16)  SpO2: 99% (03 Nov 2024 07:53) (99% - 99%)  I&O's Summary    BMI (kg/m2): 24.2 (10-31-24 @ 17:08)  PHYSICAL EXAM:    Gen: nad, resting in bed  Neuro: aaox3, no focal deficits  Heent: eomi b/l, no jvd, no oral exudates  Pulm: cta b/l, no w/r/r  CV: +s1s2, no m/r/g  Ab: soft, nt/nd, normoactive bs x 4  Extrem: no edema, pulses intact and equal  Skin: no rashes  Psych: normal    LABS:                        11.5   5.17  )-----------( 199      ( 01 Nov 2024 07:07 )             33.7       11-01    141  |  107  |  23  ----------------------------<  109  4.1   |  29  |  1.15    Ca    9.2      01 Nov 2024 07:07    TPro  5.9  /  Alb  2.7  /  TBili  0.5  /  DBili  x   /  AST  62  /  ALT  122  /  AlkPhos  81  11-01          Lactate, Blood: 1.0 mmol/L (11-01 @ 13:10)          TSH 1.832   TSH with FT4 reflex --  Total T3 --                  Urinalysis Basic - ( 01 Nov 2024 07:07 )    Color: x / Appearance: x / SG: x / pH: x  Gluc: 109 mg/dL / Ketone: x  / Bili: x / Urobili: x   Blood: x / Protein: x / Nitrite: x   Leuk Esterase: x / RBC: x / WBC x   Sq Epi: x / Non Sq Epi: x / Bacteria: x        COVID-19 PCR: NotDetec (10-31-24 @ 17:56)      RADIOLOGY & ADDITIONAL TESTS:    Care Discussed with Consultants/Other Providers:

## 2024-11-04 LAB
ALBUMIN SERPL ELPH-MCNC: 3.3 G/DL — SIGNIFICANT CHANGE UP (ref 3.3–5)
ALP SERPL-CCNC: 93 U/L — SIGNIFICANT CHANGE UP (ref 40–120)
ALT FLD-CCNC: 121 U/L — HIGH (ref 10–45)
ANION GAP SERPL CALC-SCNC: 8 MMOL/L — SIGNIFICANT CHANGE UP (ref 5–17)
AST SERPL-CCNC: 64 U/L — HIGH (ref 10–40)
BASOPHILS # BLD AUTO: 0.02 K/UL — SIGNIFICANT CHANGE UP (ref 0–0.2)
BASOPHILS NFR BLD AUTO: 0.4 % — SIGNIFICANT CHANGE UP (ref 0–2)
BILIRUB SERPL-MCNC: 0.8 MG/DL — SIGNIFICANT CHANGE UP (ref 0.2–1.2)
BUN SERPL-MCNC: 31 MG/DL — HIGH (ref 7–23)
CALCIUM SERPL-MCNC: 9.5 MG/DL — SIGNIFICANT CHANGE UP (ref 8.4–10.5)
CHLORIDE SERPL-SCNC: 102 MMOL/L — SIGNIFICANT CHANGE UP (ref 96–108)
CO2 SERPL-SCNC: 26 MMOL/L — SIGNIFICANT CHANGE UP (ref 22–31)
CREAT SERPL-MCNC: 1.22 MG/DL — SIGNIFICANT CHANGE UP (ref 0.5–1.3)
EGFR: 63 ML/MIN/1.73M2 — SIGNIFICANT CHANGE UP
EOSINOPHIL # BLD AUTO: 0.08 K/UL — SIGNIFICANT CHANGE UP (ref 0–0.5)
EOSINOPHIL NFR BLD AUTO: 1.6 % — SIGNIFICANT CHANGE UP (ref 0–6)
GLUCOSE SERPL-MCNC: 87 MG/DL — SIGNIFICANT CHANGE UP (ref 70–99)
HCT VFR BLD CALC: 37.6 % — LOW (ref 39–50)
HGB BLD-MCNC: 12.7 G/DL — LOW (ref 13–17)
IMM GRANULOCYTES NFR BLD AUTO: 0.2 % — SIGNIFICANT CHANGE UP (ref 0–0.9)
LYMPHOCYTES # BLD AUTO: 1.2 K/UL — SIGNIFICANT CHANGE UP (ref 1–3.3)
LYMPHOCYTES # BLD AUTO: 24.4 % — SIGNIFICANT CHANGE UP (ref 13–44)
MCHC RBC-ENTMCNC: 29.9 PG — SIGNIFICANT CHANGE UP (ref 27–34)
MCHC RBC-ENTMCNC: 33.8 G/DL — SIGNIFICANT CHANGE UP (ref 32–36)
MCV RBC AUTO: 88.5 FL — SIGNIFICANT CHANGE UP (ref 80–100)
MONOCYTES # BLD AUTO: 0.48 K/UL — SIGNIFICANT CHANGE UP (ref 0–0.9)
MONOCYTES NFR BLD AUTO: 9.8 % — SIGNIFICANT CHANGE UP (ref 2–14)
NEUTROPHILS # BLD AUTO: 3.13 K/UL — SIGNIFICANT CHANGE UP (ref 1.8–7.4)
NEUTROPHILS NFR BLD AUTO: 63.6 % — SIGNIFICANT CHANGE UP (ref 43–77)
NRBC # BLD: 0 /100 WBCS — SIGNIFICANT CHANGE UP (ref 0–0)
PLATELET # BLD AUTO: 203 K/UL — SIGNIFICANT CHANGE UP (ref 150–400)
POTASSIUM SERPL-MCNC: 4 MMOL/L — SIGNIFICANT CHANGE UP (ref 3.5–5.3)
POTASSIUM SERPL-SCNC: 4 MMOL/L — SIGNIFICANT CHANGE UP (ref 3.5–5.3)
PROT SERPL-MCNC: 6.8 G/DL — SIGNIFICANT CHANGE UP (ref 6–8.3)
RBC # BLD: 4.25 M/UL — SIGNIFICANT CHANGE UP (ref 4.2–5.8)
RBC # FLD: 13.5 % — SIGNIFICANT CHANGE UP (ref 10.3–14.5)
SODIUM SERPL-SCNC: 136 MMOL/L — SIGNIFICANT CHANGE UP (ref 135–145)
WBC # BLD: 4.92 K/UL — SIGNIFICANT CHANGE UP (ref 3.8–10.5)
WBC # FLD AUTO: 4.92 K/UL — SIGNIFICANT CHANGE UP (ref 3.8–10.5)

## 2024-11-04 PROCEDURE — 99232 SBSQ HOSP IP/OBS MODERATE 35: CPT

## 2024-11-04 RX ADMIN — Medication 81 MILLIGRAM(S): at 11:02

## 2024-11-04 RX ADMIN — APIXABAN 5 MILLIGRAM(S): 2.5 TABLET, FILM COATED ORAL at 18:04

## 2024-11-04 RX ADMIN — Medication 2 TABLET(S): at 21:00

## 2024-11-04 RX ADMIN — ACETAMINOPHEN, DIPHENHYDRAMINE HCL, PHENYLEPHRINE HCL 6 MILLIGRAM(S): 325; 25; 5 TABLET ORAL at 21:00

## 2024-11-04 RX ADMIN — PANTOPRAZOLE SODIUM 40 MILLIGRAM(S): 40 TABLET, DELAYED RELEASE ORAL at 05:22

## 2024-11-04 RX ADMIN — APIXABAN 5 MILLIGRAM(S): 2.5 TABLET, FILM COATED ORAL at 05:22

## 2024-11-04 RX ADMIN — AMIODARONE HYDROCHLORIDE 200 MILLIGRAM(S): 200 TABLET ORAL at 05:23

## 2024-11-04 RX ADMIN — LEVETIRACETAM 500 MILLIGRAM(S): 1000 TABLET ORAL at 18:04

## 2024-11-04 RX ADMIN — ROSUVASTATIN CALCIUM 20 MILLIGRAM(S): 5 TABLET, FILM COATED ORAL at 21:00

## 2024-11-04 RX ADMIN — LEVETIRACETAM 500 MILLIGRAM(S): 1000 TABLET ORAL at 05:22

## 2024-11-04 NOTE — PROGRESS NOTE ADULT - SUBJECTIVE AND OBJECTIVE BOX
Interval History  Patient seen and examined at bedside. No acute events noted.  Patient denies any acute complaints.     ALLERGIES:  No Known Allergies    MEDICATIONS  (STANDING):  aMIOdarone    Tablet 200 milliGRAM(s) Oral daily  apixaban 5 milliGRAM(s) Oral two times a day  aspirin  chewable 81 milliGRAM(s) Oral daily  levETIRAcetam 500 milliGRAM(s) Oral two times a day  melatonin 6 milliGRAM(s) Oral at bedtime  pantoprazole    Tablet 40 milliGRAM(s) Oral before breakfast  rosuvastatin 20 milliGRAM(s) Oral at bedtime  senna 2 Tablet(s) Oral at bedtime    MEDICATIONS  (PRN):  acetaminophen     Tablet .. 650 milliGRAM(s) Oral every 6 hours PRN Temp greater or equal to 38C (100.4F), Mild Pain (1 - 3)  polyethylene glycol 3350 17 Gram(s) Oral at bedtime PRN Constipation    Vital Signs Last 24 Hrs  T(F): 97.5 (04 Nov 2024 14:54), Max: 98.3 (04 Nov 2024 08:41)  HR: 52 (04 Nov 2024 14:54) (52 - 84)  BP: 134/70 (04 Nov 2024 14:54) (122/68 - 134/70)  RR: 16 (04 Nov 2024 14:54) (16 - 16)  SpO2: 97% (04 Nov 2024 14:54) (97% - 98%)  I&O's Summary    03 Nov 2024 07:01  -  04 Nov 2024 07:00  --------------------------------------------------------  IN: 0 mL / OUT: 500 mL / NET: -500 mL    BMI (kg/m2): 24.2 (10-31-24 @ 17:08)    PHYSICAL EXAM:  GENERAL: NAD  HEENT: NCAT  CHEST/LUNG: Clear to percussion bilaterally; No rales, rhonchi, wheezing  HEART: Regular rate and rhythm  ABDOMEN: Soft, Nontender, Nondistended; Bowel sounds present  MUSCULOSKELETAL/EXTREMITIES:  2+ Peripheral Pulses, No LE edema  PSYCH: Appropriate affect  NEURO: Alert & Oriented, left facial asymmetry, left sided weakness    I personally reviewed the below data/images/labs:    LABS:                        12.7   4.92  )-----------( 203      ( 04 Nov 2024 08:04 )             37.6       11-04    136  |  102  |  31  ----------------------------<  87  4.0   |  26  |  1.22    Ca    9.5      04 Nov 2024 08:04    TPro  6.8  /  Alb  3.3  /  TBili  0.8  /  DBili  x   /  AST  64  /  ALT  121  /  AlkPhos  93  11-04    TSH 1.832   TSH with FT4 reflex --  Total T3 --    Urinalysis Basic - ( 04 Nov 2024 08:04 )    Color: x / Appearance: x / SG: x / pH: x  Gluc: 87 mg/dL / Ketone: x  / Bili: x / Urobili: x   Blood: x / Protein: x / Nitrite: x   Leuk Esterase: x / RBC: x / WBC x   Sq Epi: x / Non Sq Epi: x / Bacteria: x    COVID-19 PCR: NotDetec (10-31-24 @ 17:56)    Consultant(s) Notes Reviewed:   Care Discused with Consultants/Other Providers:  Imaging Personally Reviewed:

## 2024-11-04 NOTE — PROGRESS NOTE ADULT - SUBJECTIVE AND OBJECTIVE BOX
SUBJECTIVE/ROS: patient seen in his room with  via phone ([employer] #[id number]).States he is feeling well today. Slept well last night. Denies any new complaints. Noted to have left facial twitching in eye and lip - similar to event on Friday. Continues on keppra. Denies chest pain, fever, chills, nausea, vomiting, abdominal pain, headache, or BLE pain.      HPI:  73 year old right handed [place]-speaking man with HTN, atrial fibrillation on Eliquis and Amiodarone, on Brilinta, former smoker x 34 years (quit 22months ago) discharged from  [facility] on 10/16 for left pulmonary nodule s/p Left lung lobectomy s/p chest tube presented to [facility] ED (10/17/24) with left sided weakness and left gaze preference. Daughter states patient last known normal at 11 am the last time she saw him well before she left to go to the store. She returned home at 1 pm and noted patient with left sided weakness and called EMS. Daughter confirmed she gave patient his Eliquis this morning. At baseline patient walks without assistive devices. On initial stroke evaluation, NIHSS-18 for patient awake with minimal verbal output right gaze preference not able to cross midline, left facial droop and LUE 0/5. LLE 1/5. CT with no ICH but noted dense right MCA. CTA head/neck with right ICA occlusion after the bifurcation extending to the supraclinoid ICA and right MCA, noted E/ICAD including L-ICA cavernous/supraclinoid segment mutlifocal stenosis, left VA mild to moderate stenosis. Patient not a candidate for TNK as on Eliquis and compliant. Patient underwent thrombectomy TICI 3. Patient admitted to MICU for close observation, required nicardipene drip. Repeat CTH with evolving Right MCA stroke without hemorrhage. Started ASA. Patient downgraded to Stroke Unit (10/19). A 1 c 6.1 (pre-OM), LDL 43 (wnl), TG 69 (wnl). TTE with mildly enlarged left atrium, mild concentric LVH, LV hyperdynamic EF >70%. [facility] CT surgery RAGINI Chacko from Dr. Mark Velez office [phone] reported that patient was on Eliquis and Brilinta prior to admission, admitted to [facility] 10/8 for left lower wedge x2 / lobectomy which required chest tube placement and was removed 10/16 and was told to hold blood thinners 3-7 days prior to admission. On discharge from [facility], EP was consulted as patient was in afib and recommend Amio load and resume Eliquis/Brilinta 10/16. Pathology resulted with adenocarcinoma but pending LN results for staging. No other HemOnc testing has been done. MRI brain with R-MCA territory infarct. Etiology of stroke cardio embolic due to Afib off AC due to recent surgery (10/2-10/16) vs hypercoagulability due to malignancy.        Vital Signs Last 24 Hrs  T(C): 36.8 (04 Nov 2024 08:41), Max: 36.8 (04 Nov 2024 08:41)  T(F): 98.3 (04 Nov 2024 08:41), Max: 98.3 (04 Nov 2024 08:41)  HR: 84 (04 Nov 2024 08:41) (56 - 84)  BP: 126/77 (04 Nov 2024 08:41) (122/68 - 133/67)  BP(mean): --  RR: 16 (04 Nov 2024 08:41) (16 - 16)  SpO2: 98% (04 Nov 2024 08:41) (97% - 98%)    Parameters below as of 04 Nov 2024 08:41  Patient On (Oxygen Delivery Method): room air          PHYSICAL EXAM  Constitutional - NAD, Comfortable in bed   HEENT - NCAT, EOMI  Neck - Supple, No limited ROM  Chest - Breathing comfortably in room air   Cardiovascular - RR - bradycardia improving   Extremities -  No calf tenderness   Neurologic Exam - patient alert, partially oriented to place (hospital in [place]), intermittently oriented to time. Continuous left facial twitching of which patient is unaware. Language normal. Mild/moderate dysarthria. L facial droop. Moves all limbs against gravity. RUE 3/5 proximally, 1/5 distally, LUE 4/5 proximally, 5/5 distally. Left tactile inattention.           LABS:                          12.7   4.92  )-----------( 203      ( 04 Nov 2024 08:04 )             37.6     11-04    136  |  102  |  31[H]  ----------------------------<  87  4.0   |  26  |  1.22    Ca    9.5      04 Nov 2024 08:04    TPro  6.8  /  Alb  3.3  /  TBili  0.8  /  DBili  x   /  AST  64[H]  /  ALT  121[H]  /  AlkPhos  93  11-04    LIVER FUNCTIONS - ( 04 Nov 2024 08:04 )  Alb: 3.3 g/dL / Pro: 6.8 g/dL / ALK PHOS: 93 U/L / ALT: 121 U/L / AST: 64 U/L / GGT: x                 MEDICATIONS  (STANDING):  aMIOdarone    Tablet 200 milliGRAM(s) Oral daily  apixaban 5 milliGRAM(s) Oral two times a day  aspirin  chewable 81 milliGRAM(s) Oral daily  levETIRAcetam 500 milliGRAM(s) Oral two times a day  melatonin 6 milliGRAM(s) Oral at bedtime  pantoprazole    Tablet 40 milliGRAM(s) Oral before breakfast  rosuvastatin 20 milliGRAM(s) Oral at bedtime  senna 2 Tablet(s) Oral at bedtime    MEDICATIONS  (PRN):  acetaminophen     Tablet .. 650 milliGRAM(s) Oral every 6 hours PRN Temp greater or equal to 38C (100.4F), Mild Pain (1 - 3)  polyethylene glycol 3350 17 Gram(s) Oral at bedtime PRN Constipation

## 2024-11-04 NOTE — PROGRESS NOTE ADULT - ASSESSMENT
72 yo right handed zhou-speaking M with PMHX HTN, atrial fibrillation, former smoker x 34 years presented to [facility] on 10/17 with right ICA occlusion and right MCA infarct s/p thrombectomy TICI 3. Discharged from [facility] day before CVA for left pulmonary nodule s/p Left lung lobectomy s/p chest tube. Had been off blood thinners (10/2-10/16)  prior to surgery, then noted to by in AFIB on day of [facility] discharge so was loaded with amiodarone and restarted on Eliquis and Brilinta. Etiology of stroke cardio embolic due to Afib off AC due to recent surgery (10/2-10/16) vs hypercoagulability due to malignancy. Lung path showing adenocarcinoma, no hemo/onc workup started.  Admitted for multidisciplinary rehab program on 10/31.      #R MCA infarct s/p  thrombectomy TICI 3  - right ICA occlusion after the bifurcation extending to the supraclinoid ICA and right MCA, noted E/ICAD including L-ICA cavernous/supraclinoid segment mutlifocal stenosis, left VA mild to moderate stenosis. s/p thrombectomy 10/17  - Etiology of stroke cardio embolic due to Afib off AC due to recent surgery (10/2-10/16) vs hypercoagulability due to malignancy  - CTH 10/31 Stable, no hemorrhage  - Continue Crestor 20 mg nightly   - Continue ASA 81 mg daily  - Continue Elliquis 5mg BID  - Fall and aspiration precautions  - Comprehensive Multidisciplinary Rehab Program: Gait, ADL, Functional impairments PT/OT/ SLP 3 hours a day 5 days a week    #Left facial twitching suspect for partial seizures 11/1  - Ativan 1 mg IV push x1   - Keppra 1 g IV load and 500 mg BID orally   - CT head urgent - stable   - EEG urgent -stable   - Prolactin, lactate -WNL     #PAF  #Noted bradycardia on 11/1  now improved   - Continue Amiodarone 200 mg daily  - Metoprolol 25 mg BID -discontinued due to persistent bradycardia   - ELIQUIS 5mg BID  - EKG on admission     #HTN/HLD  - home metoprolol 25 mg BID stopped due to bradycardia   - Continue Crestor 20mg HS  -Monitor BP     #Lung Adenocarcinoma s/p VATS lobectomy on 10/8  #Small loculated L pleural effusion post-op  - Follow up with [facility] heme/onc already established  - No treatment while in patient    #Pancreatic cyst, incidental finding on CT CAP  - Follow up with PCP for Pancreatic protocol imaging every 2 years for 10 years.     #Mood / Cognition:  - Neuropsych evaluation     #Sleep:  - Maintain quiet hours and low stim environment  - Melatonin 6mg     #Pain:  - Tylenol PRN  - avoid sedating meds that may affect cognitive recovery    #GI/Bowel:  - Senna 2 tabs daily  - Miralax prn  - GI ppx: None    #/Bladder:  - Monitor PVR if no void in 8h; SC for >400 cc  - Toileting schedule q4h    #Diet / Dysphagia:    - Diet: Minced and Moist, thin liquids  - ongoing SLP assessment  - Nutrition to follow    #Skin/ Pressure Injury Prevention:  - assessment on admission: Mid abd scar, L upper flank incision sites x 4 , dry skin generalized, R foot lateral callus   - Turn Q2hrs in bed while awake, OOB to Chair, PT/OT/SLP     #DVT prophylaxis:  -Eliquis 5mg BID  - SCDs  - Last doppler on: none    #Precautions/ Restrictions  - Falls,   - Lungs: Aspiration, Incentive Spirometer      --------------------------------------------  Outpatient Follow up:    Vitaly Dias MD  Internal medicine  [address] [place] [address] [phone]    Keke Lofton MD  [facility] Neurology  [address] [place], [place] [address] [phone]  --------------------------------------------

## 2024-11-04 NOTE — PROGRESS NOTE ADULT - ASSESSMENT
73 year old male with PMH of HTN, atrial fibrillation (on eliquis), former smoker x 34 years presented to [facility] on 10/17 with right ICA occlusion and right MCA infarct s/p thrombectomy TICI 3. Discharged from [facility] day before CVA for left pulmonary nodule s/p Left lung lobectomy s/p chest tube. Had been off blood thinners (10/2-10/16)  prior to surgery, then noted to by in AFIB on day of [facility] discharge so was loaded with amiodarone and restarted on Eliquis and Brilinta. Etiology of stroke cardio embolic due to Afib off AC due to recent surgery (10/2-10/16) vs hypercoagulability due to malignancy. Lung path showing adenocarcinoma, no hemo/onc workup started.  Admitted for multidisciplinary rehab program on 10/31.    #R MCA infarct s/p thrombectomy TICI 3  -Right ICA occlusion after the bifurcation extending to the supraclinoid ICA and right MCA, noted E/ICAD including L-ICA cavernous/supraclinoid segment mutlifocal stenosis, left VA mild to moderate stenosis. s/p thrombectomy 10/17  -Etiology of stroke cardio embolic due to Afib off AC due to recent surgery (10/2-10/16) vs hypercoagulability due to malignancy  -CT 10/31 stable, no hemorrhage  -Continue PPx: Keppra   -Continue Crestor 20mg qhs, monitor LFTs  -Continue ASA 81 mg daily  -Continue Eliquis 5mg BID  -Fall, seizure and aspiration precautions  -Pain management, bowel regimen per rehab   -Comprehensive rehab program  -Outpatient follow up with neurology     #PAF  #Asymptomatic Sinus Bradycardia  -EKG 11/3 showed sinus bradycardia  -Continue Amiodarone 200mg daily  -metoprolol discontinued given bradycardia  -Continue Eliquis 5mg BID  -TSH WNL 11/3  -Monitor VS     #HTN/HLD  -monitor bp off toprol  -Continue Crestor 20mg qhs    #Lung Adenocarcinoma s/p VATS lobectomy on 10/8  #Small loculated L pleural effusion post-op  -Follow up with [facility] heme/onc already established  -No treatment while in patient    #Pancreatic cyst, incidental finding on CT CAP  -Follow up with PCP for Pancreatic protocol imaging every 2 years for 10 years    #Transaminitis (Stable)  -Monitor CMP, may have to reduce crestor dose if uptrends   #GI ppx - PPI    #DVT prophylaxis  -Eliquis 5mg BID  -SCDs    Discussed with rehab team

## 2024-11-05 PROCEDURE — 99232 SBSQ HOSP IP/OBS MODERATE 35: CPT

## 2024-11-05 RX ADMIN — LEVETIRACETAM 500 MILLIGRAM(S): 1000 TABLET ORAL at 05:28

## 2024-11-05 RX ADMIN — APIXABAN 5 MILLIGRAM(S): 2.5 TABLET, FILM COATED ORAL at 17:17

## 2024-11-05 RX ADMIN — ACETAMINOPHEN, DIPHENHYDRAMINE HCL, PHENYLEPHRINE HCL 6 MILLIGRAM(S): 325; 25; 5 TABLET ORAL at 21:49

## 2024-11-05 RX ADMIN — APIXABAN 5 MILLIGRAM(S): 2.5 TABLET, FILM COATED ORAL at 05:28

## 2024-11-05 RX ADMIN — PANTOPRAZOLE SODIUM 40 MILLIGRAM(S): 40 TABLET, DELAYED RELEASE ORAL at 05:28

## 2024-11-05 RX ADMIN — LEVETIRACETAM 500 MILLIGRAM(S): 1000 TABLET ORAL at 17:18

## 2024-11-05 RX ADMIN — Medication 2 TABLET(S): at 21:49

## 2024-11-05 RX ADMIN — Medication 81 MILLIGRAM(S): at 12:34

## 2024-11-05 RX ADMIN — ROSUVASTATIN CALCIUM 20 MILLIGRAM(S): 5 TABLET, FILM COATED ORAL at 21:49

## 2024-11-05 RX ADMIN — AMIODARONE HYDROCHLORIDE 200 MILLIGRAM(S): 200 TABLET ORAL at 05:28

## 2024-11-05 NOTE — PROGRESS NOTE ADULT - SUBJECTIVE AND OBJECTIVE BOX
SUBJECTIVE/ROS: patient seen in his room with  via phone utilizing [employer]. He has no new complaints. He slept last night and is participating in therapy. He denies chest pain, fever, chills, nausea, vomiting, abdominal pain, headache, or BLE pain.      HPI:  73 year old right handed [facility]-speaking man with HTN, atrial fibrillation on Eliquis and Amiodarone, on Brilinta, former smoker x 34 years (quit 22months ago) discharged from  [facility] on 10/16 for left pulmonary nodule s/p Left lung lobectomy s/p chest tube presented to [facility] ED (10/17/24) with left sided weakness and left gaze preference. Daughter states patient last known normal at 11 am the last time she saw him well before she left to go to the store. She returned home at 1 pm and noted patient with left sided weakness and called EMS. Daughter confirmed she gave patient his Eliquis this morning. At baseline patient walks without assistive devices. On initial stroke evaluation, NIHSS-18 for patient awake with minimal verbal output right gaze preference not able to cross midline, left facial droop and LUE 0/5. LLE 1/5. CT with no ICH but noted dense right MCA. CTA head/neck with right ICA occlusion after the bifurcation extending to the supraclinoid ICA and right MCA, noted E/ICAD including L-ICA cavernous/supraclinoid segment mutlifocal stenosis, left VA mild to moderate stenosis. Patient not a candidate for TNK as on Eliquis and compliant. Patient underwent thrombectomy TICI 3. Patient admitted to MICU for close observation, required nicardipene drip. Repeat CTH with evolving Right MCA stroke without hemorrhage. Started ASA. Patient downgraded to Stroke Unit (10/19). A 1 c 6.1 (pre-OM), LDL 43 (wnl), TG 69 (wnl). TTE with mildly enlarged left atrium, mild concentric LVH, LV hyperdynamic EF >70%. [facility] CT surgery RAGINI Chacko from Dr. Mark Velez office [phone] reported that patient was on Eliquis and Brilinta prior to admission, admitted to [facility] 10/8 for left lower wedge x2 / lobectomy which required chest tube placement and was removed 10/16 and was told to hold blood thinners 3-7 days prior to admission. On discharge from [facility], EP was consulted as patient was in afib and recommend Amio load and resume Eliquis/Brilinta 10/16. Pathology resulted with adenocarcinoma but pending LN results for staging. No other HemOnc testing has been done. MRI brain with R-MCA territory infarct. Etiology of stroke cardio embolic due to Afib off AC due to recent surgery (10/2-10/16) vs hypercoagulability due to malignancy.        Vital Signs Last 24 Hrs  T(C): 36.7 (05 Nov 2024 07:50), Max: 36.9 (04 Nov 2024 20:15)  T(F): 98.1 (05 Nov 2024 07:50), Max: 98.4 (04 Nov 2024 20:15)  HR: 51 (05 Nov 2024 07:50) (51 - 61)  BP: 130/75 (05 Nov 2024 07:50) (123/69 - 136/73)  BP(mean): --  RR: 16 (05 Nov 2024 07:50) (16 - 16)  SpO2: 97% (05 Nov 2024 07:50) (97% - 98%)    Parameters below as of 05 Nov 2024 07:50  Patient On (Oxygen Delivery Method): room air              PHYSICAL EXAM  Constitutional - NAD, Comfortable in bed   HEENT - NCAT, EOMI  Neck - Supple, No limited ROM  Chest - Breathing comfortably in room air   Cardiovascular - RR - bradycardia improving   Extremities -  No calf tenderness   Neurologic Exam - patient alert, partially oriented to place (hospital in [place]), intermittently oriented to time. Continuous left facial twitching of which patient is unaware. Language normal. Mild/moderate dysarthria. L facial droop. Moves all limbs against gravity. RUE 3/5 proximally, 1/5 distally, LUE 4/5 proximally, 5/5 distally. Left tactile inattention.           LABS:                          12.7   4.92  )-----------( 203      ( 04 Nov 2024 08:04 )             37.6     11-04    136  |  102  |  31[H]  ----------------------------<  87  4.0   |  26  |  1.22    Ca    9.5      04 Nov 2024 08:04    TPro  6.8  /  Alb  3.3  /  TBili  0.8  /  DBili  x   /  AST  64[H]  /  ALT  121[H]  /  AlkPhos  93  11-04    LIVER FUNCTIONS - ( 04 Nov 2024 08:04 )  Alb: 3.3 g/dL / Pro: 6.8 g/dL / ALK PHOS: 93 U/L / ALT: 121 U/L / AST: 64 U/L / GGT: x                 MEDICATIONS  (STANDING):  aMIOdarone    Tablet 200 milliGRAM(s) Oral daily  apixaban 5 milliGRAM(s) Oral two times a day  aspirin  chewable 81 milliGRAM(s) Oral daily  levETIRAcetam 500 milliGRAM(s) Oral two times a day  melatonin 6 milliGRAM(s) Oral at bedtime  pantoprazole    Tablet 40 milliGRAM(s) Oral before breakfast  rosuvastatin 20 milliGRAM(s) Oral at bedtime  senna 2 Tablet(s) Oral at bedtime    MEDICATIONS  (PRN):  acetaminophen     Tablet .. 650 milliGRAM(s) Oral every 6 hours PRN Temp greater or equal to 38C (100.4F), Mild Pain (1 - 3)  polyethylene glycol 3350 17 Gram(s) Oral at bedtime PRN Constipation

## 2024-11-05 NOTE — PROGRESS NOTE ADULT - ASSESSMENT
74 yo right handed zhou-speaking M with PMHX HTN, atrial fibrillation, former smoker x 34 years presented to [facility] on 10/17 with right ICA occlusion and right MCA infarct s/p thrombectomy TICI 3. Discharged from [facility] day before CVA for left pulmonary nodule s/p Left lung lobectomy s/p chest tube. Had been off blood thinners (10/2-10/16)  prior to surgery, then noted to by in AFIB on day of [facility] discharge so was loaded with amiodarone and restarted on Eliquis and Brilinta. Etiology of stroke cardio embolic due to Afib off AC due to recent surgery (10/2-10/16) vs hypercoagulability due to malignancy. Lung path showing adenocarcinoma, no hemo/onc workup started.  Admitted for multidisciplinary rehab program on 10/31.      #R MCA infarct s/p  thrombectomy TICI 3  - right ICA occlusion after the bifurcation extending to the supraclinoid ICA and right MCA, noted E/ICAD including L-ICA cavernous/supraclinoid segment mutlifocal stenosis, left VA mild to moderate stenosis. s/p thrombectomy 10/17  - Etiology of stroke cardio embolic due to Afib off AC due to recent surgery (10/2-10/16) vs hypercoagulability due to malignancy  - CTH 10/31 Stable, no hemorrhage  - Continue Crestor 20 mg nightly   - Continue ASA 81 mg daily  - Continue Elliquis 5mg BID  - Fall and aspiration precautions  - Comprehensive Multidisciplinary Rehab Program: Gait, ADL, Functional impairments PT/OT/ SLP 3 hours a day 5 days a week    #Left facial twitching suspect for partial seizures 11/1  - Ativan 1 mg IV push x1   - Keppra 1 g IV load and 500 mg BID orally   - CT head urgent - stable   - EEG urgent -stable   - Prolactin, lactate -WNL     #PAF  #Noted bradycardia on 11/1  now improved   - Continue Amiodarone 200 mg daily  - Metoprolol 25 mg BID -discontinued due to persistent bradycardia   - ELIQUIS 5mg BID  - EKG on admission     #HTN/HLD  - home metoprolol 25 mg BID stopped due to bradycardia   - Continue Crestor 20mg HS  -Monitor BP     #Lung Adenocarcinoma s/p VATS lobectomy on 10/8  #Small loculated L pleural effusion post-op  - Follow up with [facility] heme/onc already established  - No treatment while in patient    #Pancreatic cyst, incidental finding on CT CAP  - Follow up with PCP for Pancreatic protocol imaging every 2 years for 10 years.     #Mood / Cognition:  - Neuropsych evaluation     #Sleep:  - Maintain quiet hours and low stim environment  - Melatonin 6mg     #Pain:  - Tylenol PRN  - avoid sedating meds that may affect cognitive recovery    #GI/Bowel:  - Senna 2 tabs daily  - Miralax prn  - GI ppx: None    #/Bladder:  - Monitor PVR if no void in 8h; SC for >400 cc  - Toileting schedule q4h    #Diet / Dysphagia:    - Diet: Minced and Moist, thin liquids  - ongoing SLP assessment  - Nutrition to follow    #Skin/ Pressure Injury Prevention:  - assessment on admission: Mid abd scar, L upper flank incision sites x 4 , dry skin generalized, R foot lateral callus   - Turn Q2hrs in bed while awake, OOB to Chair, PT/OT/SLP     #DVT prophylaxis:  -Eliquis 5mg BID  - TEDs    #Precautions/ Restrictions  - Falls,   - Lungs: Aspiration, Incentive Spirometer      --------------------------------------------  Outpatient Follow up:    Vitaly Dias MD  Internal medicine  [address] [place] [address] [phone]    Keke Lofton MD  [facility] Neurology  [address] [place], [place] [id number] [phone]  --------------------------------------------

## 2024-11-06 PROCEDURE — 99232 SBSQ HOSP IP/OBS MODERATE 35: CPT

## 2024-11-06 RX ADMIN — LEVETIRACETAM 500 MILLIGRAM(S): 1000 TABLET ORAL at 18:29

## 2024-11-06 RX ADMIN — Medication 81 MILLIGRAM(S): at 12:39

## 2024-11-06 RX ADMIN — APIXABAN 5 MILLIGRAM(S): 2.5 TABLET, FILM COATED ORAL at 05:29

## 2024-11-06 RX ADMIN — APIXABAN 5 MILLIGRAM(S): 2.5 TABLET, FILM COATED ORAL at 18:29

## 2024-11-06 RX ADMIN — LEVETIRACETAM 500 MILLIGRAM(S): 1000 TABLET ORAL at 05:29

## 2024-11-06 RX ADMIN — PANTOPRAZOLE SODIUM 40 MILLIGRAM(S): 40 TABLET, DELAYED RELEASE ORAL at 05:29

## 2024-11-06 RX ADMIN — ACETAMINOPHEN, DIPHENHYDRAMINE HCL, PHENYLEPHRINE HCL 6 MILLIGRAM(S): 325; 25; 5 TABLET ORAL at 20:09

## 2024-11-06 RX ADMIN — ROSUVASTATIN CALCIUM 20 MILLIGRAM(S): 5 TABLET, FILM COATED ORAL at 20:09

## 2024-11-06 RX ADMIN — AMIODARONE HYDROCHLORIDE 200 MILLIGRAM(S): 200 TABLET ORAL at 08:54

## 2024-11-06 NOTE — PROGRESS NOTE ADULT - ASSESSMENT
72 yo right handed [place]-speaking M with PMHX HTN, atrial fibrillation, former smoker x 34 years presented to [facility] on 10/17 with right ICA occlusion and right MCA infarct s/p thrombectomy TICI 3. Discharged from [facility] day before CVA for left pulmonary nodule s/p Left lung lobectomy s/p chest tube. Had been off blood thinners (10/2-10/16)  prior to surgery, then noted to by in AFIB on day of [facility] discharge so was loaded with amiodarone and restarted on Eliquis and Brilinta. Etiology of stroke cardio embolic due to Afib off AC due to recent surgery (10/2-10/16) vs hypercoagulability due to malignancy. Lung path showing adenocarcinoma, no hemo/onc workup started.  Admitted for multidisciplinary rehab program on 10/31.    #R MCA infarct s/p  thrombectomy TICI 3  - right ICA occlusion after the bifurcation extending to the supraclinoid ICA and right MCA, noted E/ICAD including L-ICA cavernous/supraclinoid segment multifocal stenosis, left VA mild to moderate stenosis. s/p thrombectomy 10/17  - Etiology of stroke cardio embolic due to Afib off AC due to recent surgery (10/2-10/16) vs hypercoagulability due to malignancy  - CTH 10/31 Stable, no hemorrhage  - Continue Crestor 20 mg nightly   - Continue ASA 81 mg daily  - Continue Eliquis 5mg BID  - Fall and aspiration precautions  - Comprehensive Multidisciplinary Rehab Program: Gait, ADL, Functional impairments PT/OT/ SLP 3 hours a day 5 days a week    #Left facial twitching suspect for partial seizures 11/1  - Ativan 1 mg IV push x1   - Keppra 1 g IV load and 500 mg BID orally   - CT head urgent - stable   - EEG urgent -stable   - Prolactin, lactate -WNL   - Per family this started about 10 years prior after a first "[place]" [place], for which he did not follow up with neurology. 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 In consideration of the MRI picture (significant cortical involvement) will continue keppra for 2 weeks then stop.     #PAF  #Noted bradycardia on 11/1  now improved   - Continue Amiodarone 200 mg daily  - Metoprolol 25 mg BID -discontinued due to persistent bradycardia   - ELIQUIS 5mg BID  - EKG on admission     #HTN/HLD  - home metoprolol 25 mg BID stopped due to bradycardia   - Continue Crestor 20mg HS  -Monitor BP     #Lung Adenocarcinoma s/p VATS lobectomy on 10/8  #Small loculated L pleural effusion post-op  - Follow up with [facility]/onc already established  - No treatment while in patient    #Pancreatic cyst, incidental finding on CT CAP  - Follow up with PCP for Pancreatic protocol imaging every 2 years for 10 years.     #Mood / Cognition:  - Neuropsych evaluation     #Sleep:  - Maintain quiet hours and low stim environment  - Melatonin 6mg     #Pain:  - Tylenol PRN  - avoid sedating meds that may affect cognitive recovery    #GI/Bowel:  - Senna 2 tabs daily  - Miralax prn  - GI ppx: None    #/Bladder:  - Monitor PVR if no void in 8h; SC for >400 cc  - Toileting schedule q4h    #Diet / Dysphagia:    - Diet: Minced and Moist, thin liquids  - ongoing SLP assessment  - Nutrition to follow    #Skin/ Pressure Injury Prevention:  - assessment on admission: Mid abd scar, L upper flank incision sites x 4 , dry skin generalized, R foot lateral callus   - Turn Q2hrs in bed while awake, OOB to Chair, PT/OT/SLP     #DVT prophylaxis:  -Eliquis 5mg BID  - TEDs    #Precautions/ Restrictions  - Falls,   - Lungs: Aspiration, Incentive Spirometer      --------------------------------------------  Outpatient Follow up:    Vitaly Dias MD  Internal medicine  [address] [place] [address] [phone]    Keke Lofton MD  [facility]  [address] [place] [address] [phone]  --------------------------------------------

## 2024-11-06 NOTE — PROGRESS NOTE ADULT - SUBJECTIVE AND OBJECTIVE BOX
Interval History  Patient seen and examined at bedside. No acute events noted.  Patient slept well, denies any acute complaints this morning.  Interval History  Patient seen and examined at bedside. No acute events noted.  Patient slept well, denies any acute complaints this morning.     ALLERGIES:  No Known Allergies    MEDICATIONS  (STANDING):  aMIOdarone    Tablet 200 milliGRAM(s) Oral daily  apixaban 5 milliGRAM(s) Oral two times a day  aspirin  chewable 81 milliGRAM(s) Oral daily  levETIRAcetam 500 milliGRAM(s) Oral two times a day  melatonin 6 milliGRAM(s) Oral at bedtime  pantoprazole    Tablet 40 milliGRAM(s) Oral before breakfast  rosuvastatin 20 milliGRAM(s) Oral at bedtime  senna 2 Tablet(s) Oral at bedtime    MEDICATIONS  (PRN):  acetaminophen     Tablet .. 650 milliGRAM(s) Oral every 6 hours PRN Temp greater or equal to 38C (100.4F), Mild Pain (1 - 3)  polyethylene glycol 3350 17 Gram(s) Oral at bedtime PRN Constipation    Vital Signs Last 24 Hrs  T(F): 97.8 (06 Nov 2024 08:46), Max: 98.5 (05 Nov 2024 19:58)  HR: 59 (06 Nov 2024 08:46) (51 - 59)  BP: 130/81 (06 Nov 2024 08:46) (129/73 - 135/72)  RR: 16 (06 Nov 2024 08:46) (16 - 16)  SpO2: 96% (06 Nov 2024 08:46) (96% - 97%)  I&O's Summary    PHYSICAL EXAM:  GENERAL: NAD, thin  HEENT: NCAT  CHEST/LUNG: Clear to percussion bilaterally; No rales, rhonchi, wheezing  HEART: Regular rate and rhythm  ABDOMEN: Soft, Nontender, Nondistended; Bowel sounds present  MUSCULOSKELETAL/EXTREMITIES:  2+ Peripheral Pulses, No LE edema  PSYCH: Appropriate affect  NEURO: Alert & Oriented, left facial asymmetry, left sided weakness    I personally reviewed the below data/images/labs:    LABS:                        12.7   4.92  )-----------( 203      ( 04 Nov 2024 08:04 )             37.6       11-04    136  |  102  |  31  ----------------------------<  87  4.0   |  26  |  1.22    Ca    9.5      04 Nov 2024 08:04    TPro  6.8  /  Alb  3.3  /  TBili  0.8  /  DBili  x   /  AST  64  /  ALT  121  /  AlkPhos  93  11-04    Urinalysis Basic - ( 04 Nov 2024 08:04 )    Color: x / Appearance: x / SG: x / pH: x  Gluc: 87 mg/dL / Ketone: x  / Bili: x / Urobili: x   Blood: x / Protein: x / Nitrite: x   Leuk Esterase: x / RBC: x / WBC x   Sq Epi: x / Non Sq Epi: x / Bacteria: x    COVID-19 PCR: NotDetec (10-31-24 @ 17:56)    Consultant(s) Notes Reviewed:   Care Discused with Consultants/Other Providers:  Imaging Personally Reviewed:

## 2024-11-06 NOTE — PROGRESS NOTE ADULT - ASSESSMENT
73 year old male with PMH of HTN, atrial fibrillation (on eliquis), former smoker x 34 years presented to [facility] on 10/17 with right ICA occlusion and right MCA infarct s/p thrombectomy TICI 3. Discharged from [facility] day before CVA for left pulmonary nodule s/p Left lung lobectomy s/p chest tube. Had been off blood thinners (10/2-10/16)  prior to surgery, then noted to by in AFIB on day of [facility] discharge so was loaded with amiodarone and restarted on Eliquis and Brilinta. Etiology of stroke cardio embolic due to Afib off AC due to recent surgery (10/2-10/16) vs hypercoagulability due to malignancy. Lung path showing adenocarcinoma, no hemo/onc workup started.  Admitted for multidisciplinary rehab program on 10/31.    #R MCA infarct s/p thrombectomy TICI 3  -Right ICA occlusion after the bifurcation extending to the supraclinoid ICA and right MCA, noted E/ICAD including L-ICA cavernous/supraclinoid segment mutlifocal stenosis, left VA mild to moderate stenosis.   -s/p thrombectomy 10/17  -Etiology of stroke cardio embolic due to Afib off AC due to recent surgery (10/2-10/16) vs hypercoagulability due to malignancy  -CTH 10/31 stable, no hemorrhage  -Continue seizure PPx: Keppra   -Continue Crestor 20mg qhs, monitor LFTs  -Continue ASA 81 mg daily  -Continue Eliquis 5mg BID  -Fall, seizure and aspiration precautions  -Pain management, bowel regimen per rehab   -Comprehensive rehab program  -Outpatient follow up with neurology     #PAF  #Asymptomatic Sinus Bradycardia  -EKG 11/3 showed sinus bradycardia  -Continue Amiodarone 200mg daily  -metoprolol discontinued given bradycardia  -Continue Eliquis 5mg BID  -TSH WNL 11/3  -Monitor VS     #HTN/HLD  -monitor bp off toprol  -Continue Crestor 20mg qhs    #Lung Adenocarcinoma s/p VATS lobectomy on 10/8  #Small loculated L pleural effusion post-op  -Follow up with [facility] heme/onc already established  -No treatment while in patient    #Pancreatic cyst, incidental finding on CT CAP  -Follow up with PCP for Pancreatic protocol imaging every 2 years for 10 years    #Transaminitis (Stable)  -Monitor CMP, may have to reduce crestor dose if uptrends   #GI ppx - PPI    #DVT prophylaxis  -Eliquis 5mg BID  -SCDs    Discussed with rehab team

## 2024-11-06 NOTE — PROGRESS NOTE ADULT - SUBJECTIVE AND OBJECTIVE BOX
SUBJECTIVE/ROS: patient seen in therapy. No events overnight. Stable motor/attention deficits. Mildly reduced facial twitching today. Per family this was present prior, residual after a first 'stroke'.  He denies chest pain, fever, chills, nausea, vomiting, abdominal pain, headache, or BLE pain.      HPI:  73 year old right handed [place]-speaking man with HTN, atrial fibrillation on Eliquis and Amiodarone, on Brilinta, former smoker x 34 years (quit 22months ago) discharged from  [facility] on 10/16 for left pulmonary nodule s/p Left lung lobectomy s/p chest tube presented to [facility] ED (10/17/24) with left sided weakness and left gaze preference. Daughter states patient last known normal at 11 am the last time she saw him well before she left to go to the store. She returned home at 1 pm and noted patient with left sided weakness and called EMS. Daughter confirmed she gave patient his Eliquis this morning. At baseline patient walks without assistive devices. On initial stroke evaluation, NIHSS-18 for patient awake with minimal verbal output right gaze preference not able to cross midline, left facial droop and LUE 0/5. LLE 1/5. CT with no ICH but noted dense right MCA. CTA head/neck with right ICA occlusion after the bifurcation extending to the supraclinoid ICA and right MCA, noted E/ICAD including L-ICA cavernous/supraclinoid segment mutlifocal stenosis, left VA mild to moderate stenosis. Patient not a candidate for TNK as on Eliquis and compliant. Patient underwent thrombectomy TICI 3. Patient admitted to MICU for close observation, required nicardipene drip. Repeat CTH with evolving Right MCA stroke without hemorrhage. Started ASA. Patient downgraded to Stroke Unit (10/19). A 1 c 6.1 (pre-OM), LDL 43 (wnl), TG 69 (wnl). TTE with mildly enlarged left atrium, mild concentric LVH, LV hyperdynamic EF >70%. [facility] CT surgery RAGINI Chacko from Dr. Mark Velez office [phone] reported that patient was on Eliquis and Brilinta prior to admission, admitted to [facility] 10/8 for left lower wedge x2 / lobectomy which required chest tube placement and was removed 10/16 and was told to hold blood thinners 3-7 days prior to admission. On discharge from [facility], EP was consulted as patient was in afib and recommend Amio load and resume Eliquis/Brilinta 10/16. Pathology resulted with adenocarcinoma but pending LN results for staging. No other HemOnc testing has been done. MRI brain with R-MCA territory infarct. Etiology of stroke cardio embolic due to Afib off AC due to recent surgery (10/2-10/16) vs hypercoagulability due to malignancy.      Vital Signs Last 24 Hrs  T(C): 36.6 (06 Nov 2024 08:46), Max: 36.9 (05 Nov 2024 19:58)  T(F): 97.8 (06 Nov 2024 08:46), Max: 98.5 (05 Nov 2024 19:58)  HR: 59 (06 Nov 2024 08:46) (51 - 59)  BP: 130/81 (06 Nov 2024 08:46) (129/73 - 135/72)  RR: 16 (06 Nov 2024 08:46) (16 - 16)  SpO2: 96% (06 Nov 2024 08:46) (96% - 97%)    Parameters below as of 06 Nov 2024 08:46  Patient On (Oxygen Delivery Method): room air    PHYSICAL EXAM  Constitutional - NAD, Comfortable in bed   HEENT - NCAT, EOMI  Neck - Supple, No limited ROM  Chest - Breathing comfortably in room air   Cardiovascular - RR - bradycardia improving   Extremities -  No calf tenderness   Neurologic Exam - patient alert, partially oriented to place (hospital in [place]), intermittently oriented to time. Improved left facial twitching. Language normal. Mild/moderate dysarthria. L facial droop. Moves all limbs against gravity. RUE 3/5 proximally, 1/5 distally, LUE 4/5 proximally, 5/5 distally. Left tactile inattention.     MEDICATIONS  (STANDING):  aMIOdarone    Tablet 200 milliGRAM(s) Oral daily  apixaban 5 milliGRAM(s) Oral two times a day  aspirin  chewable 81 milliGRAM(s) Oral daily  levETIRAcetam 500 milliGRAM(s) Oral two times a day  melatonin 6 milliGRAM(s) Oral at bedtime  pantoprazole    Tablet 40 milliGRAM(s) Oral before breakfast  rosuvastatin 20 milliGRAM(s) Oral at bedtime  senna 2 Tablet(s) Oral at bedtime    MEDICATIONS  (PRN):  acetaminophen     Tablet .. 650 milliGRAM(s) Oral every 6 hours PRN Temp greater or equal to 38C (100.4F), Mild Pain (1 - 3)  polyethylene glycol 3350 17 Gram(s) Oral at bedtime PRN Constipation

## 2024-11-06 NOTE — CHART NOTE - NSCHARTNOTEFT_GEN_A_CORE
NUTRITION Follow Up Note    RD visited pt afternoon pt sleeping and unable to awaken. Pt currently with poor appetite as per nursing + no protein preferences. Pt typically consumes traditional Chinese cooking and dissatisfied with in house food. No N/V/D/C reported. No chewing/swallowing issues on current diet texture.     SOURCE: Patient [X]  Medical Record [X]  Nursing Staff [X]  Family Member []    DIET: Diet, Minced and Moist (10-31-24 @ 13:36) [Active]          EDEMA: None Noted     LAST BM: 10/04    SKIN: No Pressure Ulcers     WEIGHT TRENDS: 132.4lbs 10/31       PERTINENT MEDICATIONS: MEDICATIONS  (STANDING):  aMIOdarone    Tablet 200 milliGRAM(s) Oral daily  apixaban 5 milliGRAM(s) Oral two times a day  aspirin  chewable 81 milliGRAM(s) Oral daily  levETIRAcetam 500 milliGRAM(s) Oral two times a day  melatonin 6 milliGRAM(s) Oral at bedtime  pantoprazole    Tablet 40 milliGRAM(s) Oral before breakfast  rosuvastatin 20 milliGRAM(s) Oral at bedtime  senna 2 Tablet(s) Oral at bedtime    MEDICATIONS  (PRN):  acetaminophen     Tablet .. 650 milliGRAM(s) Oral every 6 hours PRN Temp greater or equal to 38C (100.4F), Mild Pain (1 - 3)  polyethylene glycol 3350 17 Gram(s) Oral at bedtime PRN Constipation      PERTINENT LABS:  11-04 Na136 mmol/L Glu 87 mg/dL K+ 4.0 mmol/L Cr  1.22 mg/dL BUN 31 mg/dL[H] 11-04 Alb 3.3 g/dL        ESTIMATED NEEDS:   [X] No Change Since Previous Assessment    PREVIOUS NUTRITION DIAGNOSIS:  Malnutrition...  Moderate, acute  related to inadequate protein-energy intake s/p CVA, acute hospitalization, dislike of institutional foods  as evidenced by mild muscle wasting/fat loss, pt reports consuming <75% > 7 days  Goal: Pt will consume >75% of most meals/supplements throughout rehab course      NUTRITION DIAGNOSIS IS [X] Ongoing   NEW NUTRITION DIAGNOSIS: [X] Not Applicable    INTERVENTIONS:   1. Continue Current Nutrition Care Regimen at This Time.   2. Pt declines oral nutrition supplements at this time.     MONITORING & EVALUATION:   1. Weights   2. PO intakes   3. Skin integrity   4. Tolerance to diet prescription   5. Labs & POCT  6. Follow up (per protocol)    Registered Dietitian/Nutritionist Remains Available.  Ramy Martinez RD    Contact: [phone] or via [place] TEAMS

## 2024-11-07 LAB
ALBUMIN SERPL ELPH-MCNC: 3.1 G/DL — LOW (ref 3.3–5)
ALP SERPL-CCNC: 81 U/L — SIGNIFICANT CHANGE UP (ref 40–120)
ALT FLD-CCNC: 118 U/L — HIGH (ref 10–45)
ANION GAP SERPL CALC-SCNC: 9 MMOL/L — SIGNIFICANT CHANGE UP (ref 5–17)
AST SERPL-CCNC: 63 U/L — HIGH (ref 10–40)
BASOPHILS # BLD AUTO: 0.04 K/UL — SIGNIFICANT CHANGE UP (ref 0–0.2)
BASOPHILS NFR BLD AUTO: 1 % — SIGNIFICANT CHANGE UP (ref 0–2)
BILIRUB SERPL-MCNC: 0.7 MG/DL — SIGNIFICANT CHANGE UP (ref 0.2–1.2)
BUN SERPL-MCNC: 33 MG/DL — HIGH (ref 7–23)
CALCIUM SERPL-MCNC: 9.3 MG/DL — SIGNIFICANT CHANGE UP (ref 8.4–10.5)
CHLORIDE SERPL-SCNC: 104 MMOL/L — SIGNIFICANT CHANGE UP (ref 96–108)
CO2 SERPL-SCNC: 27 MMOL/L — SIGNIFICANT CHANGE UP (ref 22–31)
CREAT SERPL-MCNC: 1.19 MG/DL — SIGNIFICANT CHANGE UP (ref 0.5–1.3)
EGFR: 65 ML/MIN/1.73M2 — SIGNIFICANT CHANGE UP
EOSINOPHIL # BLD AUTO: 0.09 K/UL — SIGNIFICANT CHANGE UP (ref 0–0.5)
EOSINOPHIL NFR BLD AUTO: 2.2 % — SIGNIFICANT CHANGE UP (ref 0–6)
GLUCOSE SERPL-MCNC: 79 MG/DL — SIGNIFICANT CHANGE UP (ref 70–99)
HCT VFR BLD CALC: 36.9 % — LOW (ref 39–50)
HGB BLD-MCNC: 12.4 G/DL — LOW (ref 13–17)
IMM GRANULOCYTES NFR BLD AUTO: 0.2 % — SIGNIFICANT CHANGE UP (ref 0–0.9)
LYMPHOCYTES # BLD AUTO: 0.93 K/UL — LOW (ref 1–3.3)
LYMPHOCYTES # BLD AUTO: 22.7 % — SIGNIFICANT CHANGE UP (ref 13–44)
MCHC RBC-ENTMCNC: 30 PG — SIGNIFICANT CHANGE UP (ref 27–34)
MCHC RBC-ENTMCNC: 33.6 G/DL — SIGNIFICANT CHANGE UP (ref 32–36)
MCV RBC AUTO: 89.3 FL — SIGNIFICANT CHANGE UP (ref 80–100)
MONOCYTES # BLD AUTO: 0.46 K/UL — SIGNIFICANT CHANGE UP (ref 0–0.9)
MONOCYTES NFR BLD AUTO: 11.2 % — SIGNIFICANT CHANGE UP (ref 2–14)
NEUTROPHILS # BLD AUTO: 2.56 K/UL — SIGNIFICANT CHANGE UP (ref 1.8–7.4)
NEUTROPHILS NFR BLD AUTO: 62.7 % — SIGNIFICANT CHANGE UP (ref 43–77)
NRBC # BLD: 0 /100 WBCS — SIGNIFICANT CHANGE UP (ref 0–0)
PLATELET # BLD AUTO: 156 K/UL — SIGNIFICANT CHANGE UP (ref 150–400)
POTASSIUM SERPL-MCNC: 4 MMOL/L — SIGNIFICANT CHANGE UP (ref 3.5–5.3)
POTASSIUM SERPL-SCNC: 4 MMOL/L — SIGNIFICANT CHANGE UP (ref 3.5–5.3)
PROT SERPL-MCNC: 6.4 G/DL — SIGNIFICANT CHANGE UP (ref 6–8.3)
RBC # BLD: 4.13 M/UL — LOW (ref 4.2–5.8)
RBC # FLD: 13.4 % — SIGNIFICANT CHANGE UP (ref 10.3–14.5)
SODIUM SERPL-SCNC: 140 MMOL/L — SIGNIFICANT CHANGE UP (ref 135–145)
WBC # BLD: 4.09 K/UL — SIGNIFICANT CHANGE UP (ref 3.8–10.5)
WBC # FLD AUTO: 4.09 K/UL — SIGNIFICANT CHANGE UP (ref 3.8–10.5)

## 2024-11-07 PROCEDURE — 99232 SBSQ HOSP IP/OBS MODERATE 35: CPT

## 2024-11-07 PROCEDURE — 99233 SBSQ HOSP IP/OBS HIGH 50: CPT

## 2024-11-07 PROCEDURE — 99223 1ST HOSP IP/OBS HIGH 75: CPT

## 2024-11-07 RX ORDER — AMIODARONE HYDROCHLORIDE 200 MG/1
100 TABLET ORAL DAILY
Refills: 0 | Status: DISCONTINUED | OUTPATIENT
Start: 2024-11-08 | End: 2024-11-19

## 2024-11-07 RX ADMIN — AMIODARONE HYDROCHLORIDE 200 MILLIGRAM(S): 200 TABLET ORAL at 08:40

## 2024-11-07 RX ADMIN — Medication 81 MILLIGRAM(S): at 12:18

## 2024-11-07 RX ADMIN — PANTOPRAZOLE SODIUM 40 MILLIGRAM(S): 40 TABLET, DELAYED RELEASE ORAL at 05:26

## 2024-11-07 RX ADMIN — ROSUVASTATIN CALCIUM 20 MILLIGRAM(S): 5 TABLET, FILM COATED ORAL at 21:20

## 2024-11-07 RX ADMIN — ACETAMINOPHEN, DIPHENHYDRAMINE HCL, PHENYLEPHRINE HCL 6 MILLIGRAM(S): 325; 25; 5 TABLET ORAL at 21:20

## 2024-11-07 RX ADMIN — LEVETIRACETAM 500 MILLIGRAM(S): 1000 TABLET ORAL at 05:26

## 2024-11-07 RX ADMIN — LEVETIRACETAM 500 MILLIGRAM(S): 1000 TABLET ORAL at 18:08

## 2024-11-07 RX ADMIN — Medication 2 TABLET(S): at 21:21

## 2024-11-07 RX ADMIN — APIXABAN 5 MILLIGRAM(S): 2.5 TABLET, FILM COATED ORAL at 18:08

## 2024-11-07 RX ADMIN — APIXABAN 5 MILLIGRAM(S): 2.5 TABLET, FILM COATED ORAL at 05:26

## 2024-11-07 NOTE — CONSULT NOTE ADULT - SUBJECTIVE AND OBJECTIVE BOX
[facility]  [id number]      HPI:  73 year old right handed [place]-speaking man with HTN, atrial fibrillation on Eliquis and Amiodarone, former smoker x 34 years (quit 22months ago) discharged from  [facility] on 10/16 for left pulmonary nodule s/p Left lung lobectomy s/p chest tube presented to [facility] ED (10/17/24) with left sided weakness and left gaze preference. Daughter states patient last known normal at 11 am the last time she saw him well before she left to go to the store. She returned home at 1 pm and noted patient with left sided weakness and called EMS. Daughter confirmed she gave patient his Eliquis this morning. At baseline patient walks without assistive devices. On initial stroke evaluation, NIHSS-18 for patient awake with minimal verbal output right gaze preference not able to cross midline, left facial droop and LUE 0/5. LLE 1/5. CT with no ICH but noted dense right MCA. CTA head/neck with right ICA occlusion after the bifurcation extending to the supraclinoid ICA and right MCA, noted E/ICAD including L-ICA cavernous/supraclinoid segment mutlifocal stenosis, left VA mild to moderate stenosis. Patient not a candidate for TNK as on Eliquis and compliant. Patient underwent thrombectomy TICI 3. Patient admitted to MICU for close observation, required nicardipene drip. Repeat CTH with evolving Right MCA stroke without hemorrhage. Started ASA. Patient downgraded to Stroke Unit (10/19). A 1 c 6.1 (pre-OM), LDL 43 (wnl), TG 69 (wnl). TTE with mildly enlarged left atrium, mild concentric LVH, LV hyperdynamic EF >70%. [facility] CT surgery RAGINI Chacko from Dr. Mark Velez office [phone] reported that patient was on Eliquis and Brilinta prior to admission, admitted to [facility] 10/8 for left lower wedge x2 / lobectomy which required chest tube placement and was removed 10/16 and was told to hold blood thinners 3-7 days prior to admission. On discharge from [facility], EP was consulted as patient was in afib and recommend Amio load and resume Eliquis/Brilinta 10/16. Pathology resulted with adenocarcinoma but pending LN results for staging. No other HemOnc testing has been done. MRI brain with R-MCA territory infarct. Etiology of stroke cardio embolic due to Afib off AC due to recent surgery (10/2-10/16) vs hypercoagulability due to malignancy.  (31 Oct 2024 12:48)        ALLERGIES:  No Known Allergies      PAST MEDICAL & SURGICAL HISTORY:  CAD (coronary artery disease)      Hypertension      Solitary pulmonary nodule      Current smoker      Stroke      Left hydrocele      Afib      Adenocarcinoma, lung      H/O abdominal surgery      History of percutaneous coronary intervention      S/P cataract surgery      S/P lobectomy of lung            CURRENT MEDICATIONS:  MEDICATIONS  (STANDING):  aMIOdarone    Tablet 200 milliGRAM(s) Oral daily  apixaban 5 milliGRAM(s) Oral two times a day  aspirin  chewable 81 milliGRAM(s) Oral daily  levETIRAcetam 500 milliGRAM(s) Oral two times a day  melatonin 6 milliGRAM(s) Oral at bedtime  pantoprazole    Tablet 40 milliGRAM(s) Oral before breakfast  rosuvastatin 20 milliGRAM(s) Oral at bedtime  senna 2 Tablet(s) Oral at bedtime    MEDICATIONS  (PRN):  acetaminophen     Tablet .. 650 milliGRAM(s) Oral every 6 hours PRN Temp greater or equal to 38C (100.4F), Mild Pain (1 - 3)  polyethylene glycol 3350 17 Gram(s) Oral at bedtime PRN Constipation      SOCIAL HISTORY:  former tobacco use x 34 yrs    FAMILY HISTORY:  No pertinent family history in first degree relatives        ROS:  All 10 systems reviewed and positives noted in HPI    OBJECTIVE:    VITAL SIGNS:  Vital Signs Last 24 Hrs  T(C): 36.2 (07 Nov 2024 08:38), Max: 36.4 (06 Nov 2024 20:00)  T(F): 97.1 (07 Nov 2024 08:38), Max: 97.6 (06 Nov 2024 20:00)  HR: 61 (07 Nov 2024 08:38) (51 - 61)  BP: 133/77 (07 Nov 2024 08:38) (119/66 - 152/63)  BP(mean): --  RR: 16 (07 Nov 2024 08:38) (16 - 16)  SpO2: 97% (07 Nov 2024 08:38) (96% - 97%)    Parameters below as of 07 Nov 2024 08:38  Patient On (Oxygen Delivery Method): room air        PHYSICAL EXAM:  General:  no distress  HEENT: sclera anicteric  Neck: supple, no carotid bruits b/l  CVS: JVP ~ 7 cm H20, RRR, s1, s2, no murmurs/rubs/gallops  Chest: unlabored respirations, clear to auscultation b/l  Abdomen: non-distended  Extremities: no lower extremity edema b/l  Neuro: awake, alert & oriented x 3      LABS:                        12.4   4.09  )-----------( 156      ( 07 Nov 2024 05:56 )             36.9     11-07    140  |  104  |  33[H]  ----------------------------<  79  4.0   |  27  |  1.19    Ca    9.3      07 Nov 2024 05:56    TPro  6.4  /  Alb  3.1[L]  /  TBili  0.7  /  DBili  x   /  AST  63[H]  /  ALT  118[H]  /  AlkPhos  81  11-07        ECG: Sinus phoebe @ 52bpm      TTE: < from: TTE W or WO Ultrasound Enhancing Agent (10.11.24 @ 11:09) >   1. Left ventricular cavity is normal in size. Left ventricular wall thickness is normal. Left ventricular systolic function is normal with an ejection fraction of 69 % by Samuels's method of disks. There are no regional wall motion abnormalities seen.   2. Normal right ventricular cavity size, with normal wall thickness, and normal right ventricular systolic function. Tricuspid annular plane systolic excursion (TAPSE) is 2.7 cm (normal >=1.7 cm).   3. Normal left and right atrial size.   4. No significant valvular disease.   5. The inferior vena cava is normal in size measuring 1.20 cm in diameter, (normal <2.1cm) with normal inspiratory collapse (normal >50%) consistent with normal right atrial pressure (~3, range 0-5mmHg).   6. No pericardial effusion seen.       [facility]  [id number]      HPI:  73 year old right handed [place]-speaking man with HTN, atrial fibrillation on Eliquis and Amiodarone, former smoker x 34 years (quit 22months ago) discharged from  [facility] on 10/16 for left pulmonary nodule s/p Left lung lobectomy s/p chest tube presented to [facility] ED (10/17/24) with left sided weakness and left gaze preference. Daughter states patient last known normal at 11 am the last time she saw him well before she left to go to the store. She returned home at 1 pm and noted patient with left sided weakness and called EMS. Daughter confirmed she gave patient his Eliquis this morning. At baseline patient walks without assistive devices. On initial stroke evaluation, NIHSS-18 for patient awake with minimal verbal output right gaze preference not able to cross midline, left facial droop and LUE 0/5. LLE 1/5. CT with no ICH but noted dense right MCA. CTA head/neck with right ICA occlusion after the bifurcation extending to the supraclinoid ICA and right MCA, noted E/ICAD including L-ICA cavernous/supraclinoid segment mutlifocal stenosis, left VA mild to moderate stenosis. Patient not a candidate for TNK as on Eliquis and compliant. Patient underwent thrombectomy TICI 3. Patient admitted to MICU for close observation, required nicardipene drip. Repeat CTH with evolving Right MCA stroke without hemorrhage. Started ASA. Patient downgraded to Stroke Unit (10/19). A 1 c 6.1 (pre-OM), LDL 43 (wnl), TG 69 (wnl). TTE with mildly enlarged left atrium, mild concentric LVH, LV hyperdynamic EF >70%. [facility] CT surgery RAGINI Chacko from Dr. Mark Velez office [phone] reported that patient was on Eliquis and Brilinta prior to admission, admitted to [facility] 10/8 for left lower wedge x2 / lobectomy which required chest tube placement and was removed 10/16 and was told to hold blood thinners 3-7 days prior to admission. On discharge from [facility], EP was consulted as patient was in afib and recommend Amio load and resume Eliquis/Brilinta 10/16. Pathology resulted with adenocarcinoma but pending LN results for staging. No other HemOnc testing has been done. MRI brain with R-MCA territory infarct. Etiology of stroke cardio embolic due to Afib off AC due to recent surgery (10/2-10/16) vs hypercoagulability due to malignancy (less likely).  (31 Oct 2024 12:48)        ALLERGIES:  No Known Allergies      PAST MEDICAL & SURGICAL HISTORY:  CAD (coronary artery disease)      Hypertension      Solitary pulmonary nodule      Current smoker      Stroke      Left hydrocele      Afib      Adenocarcinoma, lung      H/O abdominal surgery      History of percutaneous coronary intervention      S/P cataract surgery      S/P lobectomy of lung            CURRENT MEDICATIONS:  MEDICATIONS  (STANDING):  aMIOdarone    Tablet 200 milliGRAM(s) Oral daily  apixaban 5 milliGRAM(s) Oral two times a day  aspirin  chewable 81 milliGRAM(s) Oral daily  levETIRAcetam 500 milliGRAM(s) Oral two times a day  melatonin 6 milliGRAM(s) Oral at bedtime  pantoprazole    Tablet 40 milliGRAM(s) Oral before breakfast  rosuvastatin 20 milliGRAM(s) Oral at bedtime  senna 2 Tablet(s) Oral at bedtime    MEDICATIONS  (PRN):  acetaminophen     Tablet .. 650 milliGRAM(s) Oral every 6 hours PRN Temp greater or equal to 38C (100.4F), Mild Pain (1 - 3)  polyethylene glycol 3350 17 Gram(s) Oral at bedtime PRN Constipation      SOCIAL HISTORY:  former tobacco use x 34 yrs    FAMILY HISTORY:  No pertinent family history in first degree relatives        ROS:  All 10 systems reviewed and positives noted in HPI    OBJECTIVE:    VITAL SIGNS:  Vital Signs Last 24 Hrs  T(C): 36.2 (07 Nov 2024 08:38), Max: 36.4 (06 Nov 2024 20:00)  T(F): 97.1 (07 Nov 2024 08:38), Max: 97.6 (06 Nov 2024 20:00)  HR: 61 (07 Nov 2024 08:38) (51 - 61)  BP: 133/77 (07 Nov 2024 08:38) (119/66 - 152/63)  BP(mean): --  RR: 16 (07 Nov 2024 08:38) (16 - 16)  SpO2: 97% (07 Nov 2024 08:38) (96% - 97%)    Parameters below as of 07 Nov 2024 08:38  Patient On (Oxygen Delivery Method): room air        PHYSICAL EXAM:  General:  no distress  HEENT: sclera anicteric  Neck: supple, no carotid bruits b/l  CVS: JVP ~ 7 cm H20, RRR, s1, s2, no murmurs/rubs/gallops  Chest: unlabored respirations, clear to auscultation b/l  Abdomen: non-distended  Extremities: no lower extremity edema b/l  Neuro: awake, alert & oriented x 3      LABS:                        12.4   4.09  )-----------( 156      ( 07 Nov 2024 05:56 )             36.9     11-07    140  |  104  |  33[H]  ----------------------------<  79  4.0   |  27  |  1.19    Ca    9.3      07 Nov 2024 05:56    TPro  6.4  /  Alb  3.1[L]  /  TBili  0.7  /  DBili  x   /  AST  63[H]  /  ALT  118[H]  /  AlkPhos  81  11-07        ECG: Sinus phoebe @ 52bpm      TTE: < from: TTE W or WO Ultrasound Enhancing Agent (10.11.24 @ 11:09) >   1. Left ventricular cavity is normal in size. Left ventricular wall thickness is normal. Left ventricular systolic function is normal with an ejection fraction of 69 % by Samuels's method of disks. There are no regional wall motion abnormalities seen.   2. Normal right ventricular cavity size, with normal wall thickness, and normal right ventricular systolic function. Tricuspid annular plane systolic excursion (TAPSE) is 2.7 cm (normal >=1.7 cm).   3. Normal left and right atrial size.   4. No significant valvular disease.   5. The inferior vena cava is normal in size measuring 1.20 cm in diameter, (normal <2.1cm) with normal inspiratory collapse (normal >50%) consistent with normal right atrial pressure (~3, range 0-5mmHg).   6. No pericardial effusion seen.

## 2024-11-07 NOTE — PROGRESS NOTE ADULT - ASSESSMENT
72 yo right handed [place]-speaking M with PMHX HTN, atrial fibrillation, former smoker x 34 years [facility] with right ICA occlusion and right MCA infarct s/p thrombectomy TICI 3. Discharged from [facility] day before CVA for left pulmonary nodule s/p Left lung lobectomy s/p chest tube. Had been off blood thinners (10/2-10/16)  prior to surgery, then noted to by in AFIB on day of [facility] discharge so was loaded with amiodarone and restarted on Eliquis and Brilinta. Etiology of stroke cardio embolic due to Afib off AC due to recent surgery (10/2-10/16) vs hypercoagulability due to malignancy. Lung path showing adenocarcinoma, no hemo/onc workup started.  Admitted for multidisciplinary rehab program on 10/31.    #R MCA infarct s/p  thrombectomy TICI 3  - right ICA occlusion after the bifurcation extending to the supraclinoid ICA and right MCA, noted E/ICAD including L-ICA cavernous/supraclinoid segment multifocal stenosis, left VA mild to moderate stenosis. s/p thrombectomy 10/17  - Etiology of stroke cardio embolic due to Afib off AC due to recent surgery (10/2-10/16) vs hypercoagulability due to malignancy  - CTH 10/31 Stable, no hemorrhage  - Continue Crestor 20 mg nightly   - Continue ASA 81 mg daily  - Continue Eliquis 5mg BID  - Fall and aspiration precautions  - Comprehensive Multidisciplinary Rehab Program: Gait, ADL, Functional impairments PT/OT/ SLP 3 hours a day 5 days a week    #Left facial twitching suspect for partial seizures 11/1  - Ativan 1 mg IV push x1   - Keppra 1 g IV load and 500 mg BID orally   - CT head urgent - stable   - EEG urgent -stable   - Prolactin, lactate -WNL   - Per family this started about 10 years prior after a [place] "[place]" [place], for which he did not follow up with neurology. 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 In consideration of the MRI picture (significant cortical involvement) will continue Keppra for 2 weeks then stop.     #PAF  #Noted bradycardia on 11/1 now improved   - Continue Amiodarone 200 mg daily - consult cardiology to reassess continued need  - Metoprolol 25 mg BID - discontinued due to persistent bradycardia   - ELIQUIS 5mg BID  - EKG on admission     #HTN/HLD  - home metoprolol 25 mg BID stopped due to bradycardia   - Continue Crestor 20mg HS  - Monitor BP     #Lung Adenocarcinoma s/p VATS lobectomy on 10/8  #Small loculated L pleural effusion post-op  - Follow up with [facility]/onc already established  - No treatment while in patient    #Pancreatic cyst, incidental finding on CT CAP  - Follow up with PCP for Pancreatic protocol imaging every 2 years for 10 years.     #Mood / Cognition:  - Neuropsych evaluation     #Sleep:  - Maintain quiet hours and low stim environment  - Melatonin 6mg     #Pain:  - Tylenol PRN  - avoid sedating meds that may affect cognitive recovery    #GI/Bowel:  - Senna 2 tabs daily  - Miralax prn  - GI ppx: None    #/Bladder:  - Monitor PVR if no void in 8h; SC for >400 cc  - Toileting schedule q4h    #Diet / Dysphagia:    - Diet: Minced and Moist, thin liquids  - ongoing SLP assessment  - Nutrition to follow    #Skin/ Pressure Injury Prevention:  - assessment on admission: Mid abd scar, L upper flank incision sites x 4 , dry skin generalized, R foot lateral callus   - Turn Q2hrs in bed while awake, OOB to Chair, PT/OT/SLP     #DVT prophylaxis:  -Eliquis 5mg BID  - TEDs    #Precautions/ Restrictions  - Falls,   - Lungs: Aspiration, Incentive Spirometer      --------------------------------------------  Outpatient Follow up:    Vitaly Dias MD  Internal medicine  [address] [place] [address] [phone]    Keke Lofton MD  [facility] Neurology  [address] [place] [address] [phone]  --------------------------------------------

## 2024-11-07 NOTE — PROGRESS NOTE ADULT - SUBJECTIVE AND OBJECTIVE BOX
SUBJECTIVE/ROS: patient seen in therapy. No overnight events. No new complaints. BP stable. Labs stable. HR 50-low60s. He denies chest pain, fever, chills, nausea, vomiting, abdominal pain, headache, or BLE pain.      HPI:  73 year old right handed [place]-speaking man with HTN, atrial fibrillation on Eliquis and Amiodarone, on Brilinta, former smoker x 34 years (quit 22months ago) discharged from  [facility] on 10/16 for left pulmonary nodule s/p Left lung lobectomy s/p chest tube presented to [facility] ED (10/17/24) with left sided weakness and left gaze preference. Daughter states patient last known normal at 11 am the last time she saw him well before she left to go to the store. She returned home at 1 pm and noted patient with left sided weakness and called EMS. Daughter confirmed she gave patient his Eliquis this morning. At baseline patient walks without assistive devices. On initial stroke evaluation, NIHSS-18 for patient awake with minimal verbal output right gaze preference not able to cross midline, left facial droop and LUE 0/5. LLE 1/5. CT with no ICH but noted dense right MCA. CTA head/neck with right ICA occlusion after the bifurcation extending to the supraclinoid ICA and right MCA, noted E/ICAD including L-ICA cavernous/supraclinoid segment mutlifocal stenosis, left VA mild to moderate stenosis. Patient not a candidate for TNK as on Eliquis and compliant. Patient underwent thrombectomy TICI 3. Patient admitted to MICU for close observation, required nicardipene drip. Repeat CTH with evolving Right MCA stroke without hemorrhage. Started ASA. Patient downgraded to Stroke Unit (10/19). A 1 c 6.1 (pre-OM), LDL 43 (wnl), TG 69 (wnl). TTE with mildly enlarged left atrium, mild concentric LVH, LV hyperdynamic EF >70%. [facility] CT surgery RAGINI Chacko from Dr. Mark Velez office [phone] reported that patient was on Eliquis and Brilinta prior to admission, admitted to [facility] 10/8 for left lower wedge x2 / lobectomy which required chest tube placement and was removed 10/16 and was told to hold blood thinners 3-7 days prior to admission. On discharge from [facility], EP was consulted as patient was in afib and recommend Amio load and resume Eliquis/Brilinta 10/16. Pathology resulted with adenocarcinoma but pending LN results for staging. No other HemOnc testing has been done. MRI brain with R-MCA territory infarct. Etiology of stroke cardio embolic due to Afib off AC due to recent surgery (10/2-10/16) vs hypercoagulability due to malignancy.      Vital Signs Last 24 Hrs  T(C): 36.2 (07 Nov 2024 08:38), Max: 36.4 (06 Nov 2024 20:00)  T(F): 97.1 (07 Nov 2024 08:38), Max: 97.6 (06 Nov 2024 20:00)  HR: 61 (07 Nov 2024 08:38) (51 - 61)  BP: 133/77 (07 Nov 2024 08:38) (119/66 - 152/63)  RR: 16 (07 Nov 2024 08:38) (16 - 16)  SpO2: 97% (07 Nov 2024 08:38) (96% - 97%)    Parameters below as of 07 Nov 2024 08:38  Patient On (Oxygen Delivery Method): room air      LABS:                        12.4   4.09  )-----------( 156      ( 07 Nov 2024 05:56 )             36.9     11-07    140  |  104  |  33[H]  ----------------------------<  79  4.0   |  27  |  1.19    Ca    9.3      07 Nov 2024 05:56    TPro  6.4  /  Alb  3.1[L]  /  TBili  0.7  /  DBili  x   /  AST  63[H]  /  ALT  118[H]  /  AlkPhos  81  11-07    LIVER FUNCTIONS - ( 07 Nov 2024 05:56 )  Alb: 3.1 g/dL / Pro: 6.4 g/dL / ALK PHOS: 81 U/L / ALT: 118 U/L / AST: 63 U/L / GGT: x             PHYSICAL EXAM  Constitutional - NAD, Comfortable in bed   HEENT - NCAT, EOMI  Neck - Supple, No limited ROM  Chest - Breathing comfortably in room air   Cardiovascular - RR - bradycardia improving   Extremities -  No calf tenderness   Neurologic Exam - patient alert, partially oriented to place (hospital in [place]), intermittently oriented to time. Left facial twitching. Language normal. Mild/moderate dysarthria. L facial droop. Moves all limbs against gravity. RUE 3/5 proximally, 1/5 distally, LUE 4/5 proximally, 5/5 distally. Left tactile inattention.       MEDICATIONS  (STANDING):  aMIOdarone    Tablet 200 milliGRAM(s) Oral daily  apixaban 5 milliGRAM(s) Oral two times a day  aspirin  chewable 81 milliGRAM(s) Oral daily  levETIRAcetam 500 milliGRAM(s) Oral two times a day  melatonin 6 milliGRAM(s) Oral at bedtime  pantoprazole    Tablet 40 milliGRAM(s) Oral before breakfast  rosuvastatin 20 milliGRAM(s) Oral at bedtime  senna 2 Tablet(s) Oral at bedtime    MEDICATIONS  (PRN):  acetaminophen     Tablet .. 650 milliGRAM(s) Oral every 6 hours PRN Temp greater or equal to 38C (100.4F), Mild Pain (1 - 3)  polyethylene glycol 3350 17 Gram(s) Oral at bedtime PRN Constipation

## 2024-11-07 NOTE — PROGRESS NOTE ADULT - ASSESSMENT
73 year old male with PMH of HTN, atrial fibrillation (on eliquis), former smoker x 34 years presented to [facility] on 10/17 with right ICA occlusion and right MCA infarct s/p thrombectomy TICI 3. Discharged from [facility] day before CVA for left pulmonary nodule s/p Left lung lobectomy s/p chest tube. Had been off blood thinners (10/2-10/16)  prior to surgery, then noted to by in AFIB on day of [facility] discharge so was loaded with amiodarone and restarted on Eliquis and Brilinta. Etiology of stroke cardio embolic due to Afib off AC due to recent surgery (10/2-10/16) vs hypercoagulability due to malignancy. Lung path showing adenocarcinoma, no hemo/onc workup started.  Admitted for multidisciplinary rehab program on 10/31.    #R MCA infarct s/p thrombectomy TICI 3  -Right ICA occlusion after the bifurcation extending to the supraclinoid ICA and right MCA, noted E/ICAD including L-ICA cavernous/supraclinoid segment mutlifocal stenosis, left VA mild to moderate stenosis.   -s/p thrombectomy 10/17  -Etiology of stroke cardio embolic due to Afib off AC due to recent surgery (10/2-10/16) vs hypercoagulability due to malignancy  -CTH 10/31 stable, no hemorrhage  -Continue seizure PPx: Keppra   -Continue Crestor   -Continue ASA 81 mg daily  -Continue Eliquis    #PAF  #Asymptomatic Sinus Bradycardia  -EKG 11/3 showed sinus bradycardia  -Continue Amiodarone- cardio consult for bradycardia   -metoprolol discontinued given bradycardia  -Continue Eliquis 5mg BID       #HTN/HLD  -monitor bp off bb  -Continue Crestor     #Lung Adenocarcinoma s/p VATS lobectomy on 10/8  #Small loculated L pleural effusion post-op  -Follow up with [facility] heme/onc already established  -No treatment while in patient    #Pancreatic cyst, incidental finding on CT CAP  -Follow up with PCP for Pancreatic protocol imaging every 2 years for 10 years    #Transaminitis (Stable)  -Monitor CMP, c/w Crestor for now   #GI ppx - PPI    #DVT prophylaxis  -Eliquis 5mg BID    will follow  time spent in e/m visit 50 min   Discussed with rehab team

## 2024-11-07 NOTE — CONSULT NOTE ADULT - NS ATTEND AMEND GEN_ALL_CORE FT
[id number] utilized    8-year-old male history of hypertension, A-fib on Eliquis and amiodarone, former smoker had a recent left lung lobectomy early October presented with left sided weakness shortly after.  CTA head and neck with right ICA occlusion extending to the ICA and right MCA and underwent thrombectomy.  Stroke was likely cardioembolic etiology versus hypercoagulability as per chart review.  Course was complicated by A-fib in which patient was Amio loaded and Eliquis resumed.  Patient admitted to rehab cardiology consulted for bradycardia    Pt seen and examined. no cardiac complaints. He is not very conversant and answers questions abruptly with .   Bradycardia is asymptomatic and mild. Decrease amiodarone dose to 100mg daily. Continue anticoagulation.   Will need amiodarone surveillance for toxicity.   Will follow.

## 2024-11-07 NOTE — PROGRESS NOTE ADULT - SUBJECTIVE AND OBJECTIVE BOX
73 year old male with PMH of HTN, atrial fibrillation (on eliquis), former smoker x 34 years presented to [facility] on 10/17 with right ICA occlusion and right MCA infarct s/p thrombectomy TICI 3. Discharged from [facility] day before CVA for left pulmonary nodule s/p Left lung lobectomy s/p chest tube. Had been off blood thinners (10/2-10/16)  prior to surgery, then noted to by in AFIB on day of [facility] discharge so was loaded with amiodarone and restarted on Eliquis and Brilinta. Etiology of stroke cardio embolic due to Afib off AC due to recent surgery (10/2-10/16) vs hypercoagulability due to malignancy. Lung path showing adenocarcinoma, no hemo/onc workup started.     Patient seen and examined at bedside. No acute events noted.  Patient slept well, denies any acute complaints this morning.     Vital Signs Last 24 Hrs  T(C): 36.2 (07 Nov 2024 08:38), Max: 36.4 (06 Nov 2024 20:00)  T(F): 97.1 (07 Nov 2024 08:38), Max: 97.6 (06 Nov 2024 20:00)  HR: 61 (07 Nov 2024 08:38) (51 - 61)  BP: 133/77 (07 Nov 2024 08:38) (119/66 - 152/63)  BP(mean): --  RR: 16 (07 Nov 2024 08:38) (16 - 16)  SpO2: 97% (07 Nov 2024 08:38) (96% - 97%)    Parameters below as of 07 Nov 2024 08:38  Patient On (Oxygen Delivery Method): room air    GENERAL- NAD  EAR/NOSE/MOUTH/THROAT -  MMM  EYES- EARNEST, conjunctiva and Sclera clear  NECK- supple  RESPIRATORY-  clear to auscultation bilaterally  CARDIOVASCULAR - SIS2, RRR  GI - soft NT BS present  EXTREMITIES- no pedal edema  NEUROLOGY- left sided weakness                12.4                 140  | 27   | 33           4.09  >-----------< 156     ------------------------< 79                    36.9                 4.0  | 104  | 1.19                                         Ca 9.3   Mg x     Ph x          < from: 12 Lead ECG (11.03.24 @ 05:18) >  Diagnosis Line Sinus bradycardia 52    < end of copied text >    MEDICATIONS  (STANDING):  aMIOdarone    Tablet 200 milliGRAM(s) Oral daily  apixaban 5 milliGRAM(s) Oral two times a day  aspirin  chewable 81 milliGRAM(s) Oral daily  levETIRAcetam 500 milliGRAM(s) Oral two times a day  melatonin 6 milliGRAM(s) Oral at bedtime  pantoprazole    Tablet 40 milliGRAM(s) Oral before breakfast  rosuvastatin 20 milliGRAM(s) Oral at bedtime  senna 2 Tablet(s) Oral at bedtime    MEDICATIONS  (PRN):  acetaminophen     Tablet .. 650 milliGRAM(s) Oral every 6 hours PRN Temp greater or equal to 38C (100.4F), Mild Pain (1 - 3)  polyethylene glycol 3350 17 Gram(s) Oral at bedtime PRN Constipation

## 2024-11-07 NOTE — CONSULT NOTE ADULT - ASSESSMENT
78-year-old male history of hypertension, A-fib on Eliquis and amiodarone, former smoker had a recent left lung lobectomy early October presented with left sided weakness shortly after.  CTA head and neck with right ICA occlusion extending to the ICA and right MCA and underwent thrombectomy.  Stroke was likely cardioembolic etiology versus hypercoagulability as per chart review.  Course was complicated by A-fib in which patient was Amio loaded and Eliquis resumed.  Patient admitted to rehab cardiology consulted for bradycardia    Recommendations  EKG consistent with sinus bradycardia, pt asymptomatic  Beta-blocker discontinued by primary team  Reduce amiodarone to 100 daily and obtain daily EKGs  Recommended yearly checks of LFTs, thyroid, EKG and lungs/PFTs

## 2024-11-08 PROCEDURE — 99232 SBSQ HOSP IP/OBS MODERATE 35: CPT

## 2024-11-08 PROCEDURE — 93010 ELECTROCARDIOGRAM REPORT: CPT

## 2024-11-08 RX ORDER — MIRTAZAPINE 15 MG/1
7.5 TABLET, FILM COATED ORAL AT BEDTIME
Refills: 0 | Status: DISCONTINUED | OUTPATIENT
Start: 2024-11-08 | End: 2024-11-19

## 2024-11-08 RX ORDER — ESCITALOPRAM OXALATE 10 MG/1
5 TABLET, FILM COATED ORAL DAILY
Refills: 0 | Status: DISCONTINUED | OUTPATIENT
Start: 2024-11-08 | End: 2024-11-08

## 2024-11-08 RX ORDER — ACETAMINOPHEN, DIPHENHYDRAMINE HCL, PHENYLEPHRINE HCL 325; 25; 5 MG/1; MG/1; MG/1
9 TABLET ORAL AT BEDTIME
Refills: 0 | Status: DISCONTINUED | OUTPATIENT
Start: 2024-11-08 | End: 2024-11-19

## 2024-11-08 RX ORDER — LEVETIRACETAM 1000 MG/1
250 TABLET ORAL
Refills: 0 | Status: DISCONTINUED | OUTPATIENT
Start: 2024-11-08 | End: 2024-11-14

## 2024-11-08 RX ADMIN — LEVETIRACETAM 250 MILLIGRAM(S): 1000 TABLET ORAL at 17:50

## 2024-11-08 RX ADMIN — AMIODARONE HYDROCHLORIDE 100 MILLIGRAM(S): 200 TABLET ORAL at 08:45

## 2024-11-08 RX ADMIN — ACETAMINOPHEN, DIPHENHYDRAMINE HCL, PHENYLEPHRINE HCL 9 MILLIGRAM(S): 325; 25; 5 TABLET ORAL at 21:27

## 2024-11-08 RX ADMIN — Medication 2 TABLET(S): at 21:27

## 2024-11-08 RX ADMIN — PANTOPRAZOLE SODIUM 40 MILLIGRAM(S): 40 TABLET, DELAYED RELEASE ORAL at 05:26

## 2024-11-08 RX ADMIN — LEVETIRACETAM 500 MILLIGRAM(S): 1000 TABLET ORAL at 05:26

## 2024-11-08 RX ADMIN — APIXABAN 5 MILLIGRAM(S): 2.5 TABLET, FILM COATED ORAL at 17:50

## 2024-11-08 RX ADMIN — APIXABAN 5 MILLIGRAM(S): 2.5 TABLET, FILM COATED ORAL at 05:26

## 2024-11-08 RX ADMIN — MIRTAZAPINE 7.5 MILLIGRAM(S): 15 TABLET, FILM COATED ORAL at 21:49

## 2024-11-08 RX ADMIN — Medication 81 MILLIGRAM(S): at 11:53

## 2024-11-08 RX ADMIN — ROSUVASTATIN CALCIUM 20 MILLIGRAM(S): 5 TABLET, FILM COATED ORAL at 21:27

## 2024-11-08 NOTE — PROGRESS NOTE ADULT - ASSESSMENT
74 yo right handed [place]-speaking M with PMHX HTN, atrial fibrillation, former smoker x 34 years presented to [facility] on 10/17 with right ICA occlusion and right MCA infarct s/p thrombectomy TICI 3. Discharged from [facility] day before CVA for left pulmonary nodule s/p Left lung lobectomy s/p chest tube. Had been off blood thinners (10/2-10/16)  prior to surgery, then noted to by in AFIB on day of [facility] discharge so was loaded with amiodarone and restarted on Eliquis and Brilinta. Etiology of stroke cardio embolic due to Afib off AC due to recent surgery (10/2-10/16) vs hypercoagulability due to malignancy. Lung path showing adenocarcinoma, no hemo/onc workup started.  Admitted for multidisciplinary rehab program on 10/31.    #R MCA infarct s/p  thrombectomy TICI 3  - right ICA occlusion after the bifurcation extending to the supraclinoid ICA and right MCA, noted E/ICAD including L-ICA cavernous/supraclinoid segment multifocal stenosis, left VA mild to moderate stenosis. s/p thrombectomy 10/17  - Etiology of stroke cardio embolic due to Afib off AC due to recent surgery (10/2-10/16) vs hypercoagulability due to malignancy  - CTH 10/31 Stable, no hemorrhage  - Continue Crestor 20 mg nightly   - Continue ASA 81 mg daily  - Continue Eliquis 5mg BID  - Fall and aspiration precautions  - Comprehensive Multidisciplinary Rehab Program: Gait, ADL, Functional impairments PT/OT/ SLP 3 hours a day 5 days a week    #Left facial twitching suspect for partial seizures 11/1  - Ativan 1 mg IV push x1   - Keppra 1 g IV load and continue 500 mg BID orally   - CT head urgent - stable   - EEG urgent -stable   - Prolactin, lactate -WNL   - Per family this started about 10 years prior after a [place] "[place] for which he did not follow up with neurology. 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 In consideration of the MRI picture (significant cortical involvement) will continue Keppra for 2 weeks then stop.     #PAF  #Noted bradycardia on 11/1 now improved   - Amiodarone reduced to 100mg daily as per cardiology -to continue to monitor HR   - Metoprolol 25 mg BID - discontinued due to persistent bradycardia   - ELIQUIS 5mg BID  - EKG  -Cardiology following      #HTN/HLD  - home metoprolol 25 mg BID stopped due to bradycardia   - Continue Crestor 20mg HS  - Monitor BP     #Lung Adenocarcinoma s/p VATS lobectomy on 10/8  #Small loculated L pleural effusion post-op  - Follow up with [facility]/onc already established  - No treatment while in patient    #Pancreatic cyst, incidental finding on CT CAP  - Follow up with PCP for Pancreatic protocol imaging every 2 years for 10 years.     #Mood / Cognition:  - Neuropsych evaluation     #Sleep/mood  - Maintain quiet hours and low stim environment  - Melatonin increased to 9mg at bedtime 11/8  -Start lexapro 5mg daily 11/8     #Pain:  - Tylenol PRN  - avoid sedating meds that may affect cognitive recovery    #GI/Bowel:  - Senna 2 tabs daily  - Miralax prn  - GI ppx: None    #/Bladder:  - Monitor PVR if no void in 8h; SC for >400 cc  - Toileting schedule q4h    #Diet / Dysphagia:    - Diet: Minced and Moist, thin liquids  - ongoing SLP assessment  - Nutrition to follow    #Skin/ Pressure Injury Prevention:  - assessment on admission: Mid abd scar, L upper flank incision sites x 4 , dry skin generalized, R foot lateral callus   - Turn Q2hrs in bed while awake, OOB to Chair, PT/OT/SLP     #DVT prophylaxis:  -Eliquis 5mg BID  - TEDs    #Precautions/ Restrictions  - Falls,   - Lungs: Aspiration, Incentive Spirometer      --------------------------------------------  Outpatient Follow up:    Vitaly Dias MD  Internal medicine  [address] [place] [address] [phone]    Keke Lofton MD  [facility] Neurology  [address] [place] [address] [phone]  --------------------------------------------     72 yo right handed [place]-speaking M with PMHX HTN, atrial fibrillation, former smoker x 34 years presented to [facility] on 10/17 with right ICA occlusion and right MCA infarct s/p thrombectomy TICI 3. Discharged from [facility] day before CVA for left pulmonary nodule s/p Left lung lobectomy s/p chest tube. Had been off blood thinners (10/2-10/16)  prior to surgery, then noted to by in AFIB on day of [facility] discharge so was loaded with amiodarone and restarted on Eliquis and Brilinta. Etiology of stroke cardio embolic due to Afib off AC due to recent surgery (10/2-10/16) vs hypercoagulability due to malignancy. Lung path showing adenocarcinoma, no hemo/onc workup started.  Admitted for multidisciplinary rehab program on 10/31.    #R MCA infarct s/p  thrombectomy TICI 3  - right ICA occlusion after the bifurcation extending to the supraclinoid ICA and right MCA, noted E/ICAD including L-ICA cavernous/supraclinoid segment multifocal stenosis, left VA mild to moderate stenosis. s/p thrombectomy 10/17  - Etiology of stroke cardio embolic due to Afib off AC due to recent surgery (10/2-10/16) vs hypercoagulability due to malignancy  - CTH 10/31 Stable, no hemorrhage  - Continue Crestor 20 mg nightly   - Continue ASA 81 mg daily  - Continue Eliquis 5mg BID  - Fall and aspiration precautions  - Comprehensive Multidisciplinary Rehab Program: Gait, ADL, Functional impairments PT/OT/ SLP 3 hours a day 5 days a week    #Left facial twitching suspect for partial seizures 11/1  - Ativan 1 mg IV push x1   - Keppra 1 g IV load and continue 500 mg BID orally   - CT head urgent - stable   - EEG urgent -stable   - Prolactin, lactate -WNL   - Per family this started about 10 years prior after a first "[place]" [place], for which he did not follow up with neurology. 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 In consideration of the MRI picture (significant cortical involvement) will continue Keppra for 2 weeks then stop.     #PAF  #Noted bradycardia on 11/1 now improved   - Amiodarone reduced to 100mg daily as per cardiology -to continue to monitor HR   - Metoprolol 25 mg BID - discontinued due to persistent bradycardia   - ELIQUIS 5mg BID  - EKG  -Cardiology following      #HTN/HLD  - home metoprolol 25 mg BID stopped due to bradycardia   - Continue Crestor 20mg HS  - Monitor BP     #Lung Adenocarcinoma s/p VATS lobectomy on 10/8  #Small loculated L pleural effusion post-op  - Follow up with [facility]/onc already established  - No treatment while in patient    #Pancreatic cyst, incidental finding on CT CAP  - Follow up with PCP for Pancreatic protocol imaging every 2 years for 10 years.     #Mood / Cognition:  - Neuropsych evaluation     #Sleep/mood  - Maintain quiet hours and low stim environment  - Melatonin increased to 9mg at bedtime 11/8  -Start Remeron 7.5mg daily 11/8 -to assist with mood/sleep/appetite     #Pain:  - Tylenol PRN  - avoid sedating meds that may affect cognitive recovery    #GI/Bowel:  - Senna 2 tabs daily  - Miralax prn  - GI ppx: None    #/Bladder:  - Monitor PVR if no void in 8h; SC for >400 cc  - Toileting schedule q4h    #Diet / Dysphagia:    - Diet: Minced and Moist, thin liquids  - ongoing SLP assessment  - Nutrition to follow    #Skin/ Pressure Injury Prevention:  - assessment on admission: Mid abd scar, L upper flank incision sites x 4 , dry skin generalized, R foot lateral callus   - Turn Q2hrs in bed while awake, OOB to Chair, PT/OT/SLP     #DVT prophylaxis:  -Eliquis 5mg BID  - TEDs    #Precautions/ Restrictions  - Falls,   - Lungs: Aspiration, Incentive Spirometer      --------------------------------------------  Outpatient Follow up:    Vitaly Dias MD  Internal medicine  [address] [place] [address] [phone]    Keke Lofton MD  [facility] Neurology  [address] [place] [address] [phone]  --------------------------------------------     72 yo right handed zhou-speaking M with PMHX HTN, atrial fibrillation, former smoker x 34 years presented to [facility] on 10/17 with right ICA occlusion and right MCA infarct s/p thrombectomy TICI 3. Discharged from [facility] day before CVA for left pulmonary nodule s/p Left lung lobectomy s/p chest tube. Had been off blood thinners (10/2-10/16)  prior to surgery, then noted to by in AFIB on day of [facility] discharge so was loaded with amiodarone and restarted on Eliquis and Brilinta. Etiology of stroke cardio embolic due to Afib off AC due to recent surgery (10/2-10/16) vs hypercoagulability due to malignancy. Lung path showing adenocarcinoma, no hemo/onc workup started.  Admitted for multidisciplinary rehab program on 10/31.    #R MCA infarct s/p  thrombectomy TICI 3  - right ICA occlusion after the bifurcation extending to the supraclinoid ICA and right MCA, noted E/ICAD including L-ICA cavernous/supraclinoid segment multifocal stenosis, left VA mild to moderate stenosis. s/p thrombectomy 10/17  - Etiology of stroke cardio embolic due to Afib off AC due to recent surgery (10/2-10/16) vs hypercoagulability due to malignancy  - CTH 10/31 Stable, no hemorrhage  - Continue Crestor 20 mg nightly   - Continue ASA 81 mg daily  - Continue Eliquis 5mg BID  - Fall and aspiration precautions  - Comprehensive Multidisciplinary Rehab Program: Gait, ADL, Functional impairments PT/OT/ SLP 3 hours a day 5 days a week    #Left facial twitching suspect for partial seizures 11/1  - Ativan 1 mg IV push x1   - Keppra 1 g IV load and continue 500 mg BID orally - reducing to 250 mg BID on 11/8  - CT head urgent - stable   - EEG urgent -stable   - Prolactin, lactate -WNL   - Per family this started about 10 years prior after a first "[place]" [place], for which he did not follow up with neurology. 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 In consideration of the MRI picture (significant cortical involvement) will taper Keppra to 250 mg BID   #PAF  #Noted bradycardia on 11/1 now improved   - Amiodarone reduced to 100mg daily as per cardiology -to continue to monitor HR   - Metoprolol 25 mg BID - discontinued due to persistent bradycardia   - ELIQUIS 5mg BID  - EKG  -Cardiology following      #HTN/HLD  - home metoprolol 25 mg BID stopped due to bradycardia   - Continue Crestor 20mg HS  - Monitor BP     #Lung Adenocarcinoma s/p VATS lobectomy on 10/8  #Small loculated L pleural effusion post-op  - Follow up with [facility]/onc already established  - No treatment while in patient    #Pancreatic cyst, incidental finding on CT CAP  - Follow up with PCP for Pancreatic protocol imaging every 2 years for 10 years.     #Mood / Cognition:  - Neuropsych evaluation     #Sleep/mood  - Maintain quiet hours and low stim environment  - Melatonin increased to 9mg at bedtime 11/8  -Start Remeron 7.5mg daily 11/8 -to assist with mood/sleep/appetite     #Pain:  - Tylenol PRN  - avoid sedating meds that may affect cognitive recovery    #GI/Bowel:  - Senna 2 tabs daily  - Miralax prn  - GI ppx: None    #/Bladder:  - Monitor PVR if no void in 8h; SC for >400 cc  - Toileting schedule q4h    #Diet / Dysphagia:    - Diet: Minced and Moist, thin liquids  - ongoing SLP assessment  - Nutrition to follow    #Skin/ Pressure Injury Prevention:  - assessment on admission: Mid abd scar, L upper flank incision sites x 4 , dry skin generalized, R foot lateral callus   - Turn Q2hrs in bed while awake, OOB to Chair, PT/OT/SLP     #DVT prophylaxis:  -Eliquis 5mg BID  - TEDs    #Precautions/ Restrictions  - Falls,   - Lungs: Aspiration, Incentive Spirometer      --------------------------------------------  Outpatient Follow up:    Vitaly Dias MD  Internal medicine  [address] [place] [address] [phone]    Keke Lofton MD  [facility] Neurology  [address] [place] [address] [phone]  --------------------------------------------

## 2024-11-08 NOTE — PROGRESS NOTE ADULT - SUBJECTIVE AND OBJECTIVE BOX
SUBJECTIVE/ROS: Patient seen in the room.  [id number]. He states that he feels the same and is concerned about his left arm. He notes that sleep has been difficult due to noise of the air vent - will offer ear plugs. He also appears very down/depressed and could benefit from SSRI for neurorecovery and mood.  He denies chest pain, fever, chills, nausea, vomiting, abdominal pain, headache, or BLE pain.      HPI:  73 year old right handed [place]-speaking man with HTN, atrial fibrillation on Eliquis and Amiodarone, on Brilinta, former smoker x 34 years (quit 22months ago) discharged from  [facility] on 10/16 for left pulmonary nodule s/p Left lung lobectomy s/p chest tube presented to [facility] ED (10/17/24) with left sided weakness and left gaze preference. Daughter states patient last known normal at 11 am the last time she saw him well before she left to go to the store. She returned home at 1 pm and noted patient with left sided weakness and called EMS. Daughter confirmed she gave patient his Eliquis this morning. At baseline patient walks without assistive devices. On initial stroke evaluation, NIHSS-18 for patient awake with minimal verbal output right gaze preference not able to cross midline, left facial droop and LUE 0/5. LLE 1/5. CT with no ICH but noted dense right MCA. CTA head/neck with right ICA occlusion after the bifurcation extending to the supraclinoid ICA and right MCA, noted E/ICAD including L-ICA cavernous/supraclinoid segment mutlifocal stenosis, left VA mild to moderate stenosis. Patient not a candidate for TNK as on Eliquis and compliant. Patient underwent thrombectomy TICI 3. Patient admitted to MICU for close observation, required nicardipene drip. Repeat CTH with evolving Right MCA stroke without hemorrhage. Started ASA. Patient downgraded to Stroke Unit (10/19). A 1 c 6.1 (pre-OM), LDL 43 (wnl), TG 69 (wnl). TTE with mildly enlarged left atrium, mild concentric LVH, LV hyperdynamic EF >70%. [facility] CT surgery RAGINI Chacko from Dr. Mark Velez office [phone] reported that patient was on Eliquis and Brilinta prior to admission, admitted to [facility] 10/8 for left lower wedge x2 / lobectomy which required chest tube placement and was removed 10/16 and was told to hold blood thinners 3-7 days prior to admission. On discharge from [facility], EP was consulted as patient was in afib and recommend Amio load and resume Eliquis/Brilinta 10/16. Pathology resulted with adenocarcinoma but pending LN results for staging. No other HemOnc testing has been done. MRI brain with R-MCA territory infarct. Etiology of stroke cardio embolic due to Afib off AC due to recent surgery (10/2-10/16) vs hypercoagulability due to malignancy.        Vital Signs Last 24 Hrs  T(C): 36.4 (08 Nov 2024 08:40), Max: 36.6 (07 Nov 2024 21:19)  T(F): 97.5 (08 Nov 2024 08:40), Max: 97.9 (07 Nov 2024 21:19)  HR: 72 (08 Nov 2024 08:40) (50 - 72)  BP: 148/83 (08 Nov 2024 08:40) (146/75 - 149/75)  BP(mean): --  RR: 16 (08 Nov 2024 08:40) (16 - 16)  SpO2: 96% (08 Nov 2024 08:40) (95% - 96%)    Parameters below as of 08 Nov 2024 08:40  Patient On (Oxygen Delivery Method): room air        PHYSICAL EXAM  Constitutional - NAD, Comfortable in bed   HEENT - NCAT, EOMI  Neck - Supple, No limited ROM  Chest - Breathing comfortably in room air   Cardiovascular - RR - bradycardia improving   Extremities -  No calf tenderness   Neurologic Exam - patient alert, partially oriented to place (hospital in [place]), intermittently oriented to time. Left facial twitching. Language normal. Mild/moderate dysarthria. L facial droop. Moves all limbs against gravity. RUE 3/5 proximally, 1/5 distally, LUE 4/5 proximally, 5/5 distally. Left tactile inattention.           LABS:                        12.4   4.09  )-----------( 156      ( 07 Nov 2024 05:56 )             36.9     11-07    140  |  104  |  33[H]  ----------------------------<  79  4.0   |  27  |  1.19    Ca    9.3      07 Nov 2024 05:56    TPro  6.4  /  Alb  3.1[L]  /  TBili  0.7  /  DBili  x   /  AST  63[H]  /  ALT  118[H]  /  AlkPhos  81  11-07    LIVER FUNCTIONS - ( 07 Nov 2024 05:56 )  Alb: 3.1 g/dL / Pro: 6.4 g/dL / ALK PHOS: 81 U/L / ALT: 118 U/L / AST: 63 U/L / GGT: x             MEDICATIONS  (STANDING):  aMIOdarone    Tablet 100 milliGRAM(s) Oral daily  apixaban 5 milliGRAM(s) Oral two times a day  aspirin  chewable 81 milliGRAM(s) Oral daily  escitalopram 5 milliGRAM(s) Oral daily  levETIRAcetam 500 milliGRAM(s) Oral two times a day  melatonin 9 milliGRAM(s) Oral at bedtime  pantoprazole    Tablet 40 milliGRAM(s) Oral before breakfast  rosuvastatin 20 milliGRAM(s) Oral at bedtime  senna 2 Tablet(s) Oral at bedtime    MEDICATIONS  (PRN):  acetaminophen     Tablet .. 650 milliGRAM(s) Oral every 6 hours PRN Temp greater or equal to 38C (100.4F), Mild Pain (1 - 3)  polyethylene glycol 3350 17 Gram(s) Oral at bedtime PRN Constipation   SUBJECTIVE/ROS: Patient seen in the room.  [id number]. He states that he feels the same and is concerned about his left arm. He notes that sleep has been difficult due to noise of the air vent - will offer ear plugs. He also appears very down/depressed and could benefit from medication for neurorecovery and mood.  He denies chest pain, fever, chills, nausea, vomiting, abdominal pain, headache, or BLE pain.      HPI:  73 year old right handed [place]-speaking man with HTN, atrial fibrillation on Eliquis and Amiodarone, on Brilinta, former smoker x 34 years (quit 22months ago) discharged from  [facility] on 10/16 for left pulmonary nodule s/p Left lung lobectomy s/p chest tube presented to [facility] ED (10/17/24) with left sided weakness and left gaze preference. Daughter states patient last known normal at 11 am the last time she saw him well before she left to go to the store. She returned home at 1 pm and noted patient with left sided weakness and called EMS. Daughter confirmed she gave patient his Eliquis this morning. At baseline patient walks without assistive devices. On initial stroke evaluation, NIHSS-18 for patient awake with minimal verbal output right gaze preference not able to cross midline, left facial droop and LUE 0/5. LLE 1/5. CT with no ICH but noted dense right MCA. CTA head/neck with right ICA occlusion after the bifurcation extending to the supraclinoid ICA and right MCA, noted E/ICAD including L-ICA cavernous/supraclinoid segment mutlifocal stenosis, left VA mild to moderate stenosis. Patient not a candidate for TNK as on Eliquis and compliant. Patient underwent thrombectomy TICI 3. Patient admitted to MICU for close observation, required nicardipene drip. Repeat CTH with evolving Right MCA stroke without hemorrhage. Started ASA. Patient downgraded to Stroke Unit (10/19). A 1 c 6.1 (pre-OM), LDL 43 (wnl), TG 69 (wnl). TTE with mildly enlarged left atrium, mild concentric LVH, LV hyperdynamic EF >70%. [facility] CT surgery RAGINI Chacko from Dr. Mark Velez office [phone] reported that patient was on Eliquis and Brilinta prior to admission, admitted to [facility] 10/8 for left lower wedge x2 / lobectomy which required chest tube placement and was removed 10/16 and was told to hold blood thinners 3-7 days prior to admission. On discharge from [facility], EP was consulted as patient was in afib and recommend Amio load and resume Eliquis/Brilinta 10/16. Pathology resulted with adenocarcinoma but pending LN results for staging. No other HemOnc testing has been done. MRI brain with R-MCA territory infarct. Etiology of stroke cardio embolic due to Afib off AC due to recent surgery (10/2-10/16) vs hypercoagulability due to malignancy.        Vital Signs Last 24 Hrs  T(C): 36.4 (08 Nov 2024 08:40), Max: 36.6 (07 Nov 2024 21:19)  T(F): 97.5 (08 Nov 2024 08:40), Max: 97.9 (07 Nov 2024 21:19)  HR: 72 (08 Nov 2024 08:40) (50 - 72)  BP: 148/83 (08 Nov 2024 08:40) (146/75 - 149/75)  BP(mean): --  RR: 16 (08 Nov 2024 08:40) (16 - 16)  SpO2: 96% (08 Nov 2024 08:40) (95% - 96%)    Parameters below as of 08 Nov 2024 08:40  Patient On (Oxygen Delivery Method): room air        PHYSICAL EXAM  Constitutional - NAD, Comfortable in bed   HEENT - NCAT, EOMI  Neck - Supple, No limited ROM  Chest - Breathing comfortably in room air   Cardiovascular - RR - bradycardia improving   Extremities -  No calf tenderness   Neurologic Exam - patient alert, partially oriented to place (hospital in [place]), intermittently oriented to time. Left facial twitching. Language normal. Mild/moderate dysarthria. L facial droop. Moves all limbs against gravity. RUE 3/5 proximally, 1/5 distally, LUE 4/5 proximally, 5/5 distally. Left tactile inattention.           LABS:                        12.4   4.09  )-----------( 156      ( 07 Nov 2024 05:56 )             36.9     11-07    140  |  104  |  33[H]  ----------------------------<  79  4.0   |  27  |  1.19    Ca    9.3      07 Nov 2024 05:56    TPro  6.4  /  Alb  3.1[L]  /  TBili  0.7  /  DBili  x   /  AST  63[H]  /  ALT  118[H]  /  AlkPhos  81  11-07    LIVER FUNCTIONS - ( 07 Nov 2024 05:56 )  Alb: 3.1 g/dL / Pro: 6.4 g/dL / ALK PHOS: 81 U/L / ALT: 118 U/L / AST: 63 U/L / GGT: x             MEDICATIONS  (STANDING):  aMIOdarone    Tablet 100 milliGRAM(s) Oral daily  apixaban 5 milliGRAM(s) Oral two times a day  aspirin  chewable 81 milliGRAM(s) Oral daily  escitalopram 5 milliGRAM(s) Oral daily  levETIRAcetam 500 milliGRAM(s) Oral two times a day  melatonin 9 milliGRAM(s) Oral at bedtime  pantoprazole    Tablet 40 milliGRAM(s) Oral before breakfast  rosuvastatin 20 milliGRAM(s) Oral at bedtime  senna 2 Tablet(s) Oral at bedtime    MEDICATIONS  (PRN):  acetaminophen     Tablet .. 650 milliGRAM(s) Oral every 6 hours PRN Temp greater or equal to 38C (100.4F), Mild Pain (1 - 3)  polyethylene glycol 3350 17 Gram(s) Oral at bedtime PRN Constipation

## 2024-11-08 NOTE — CHART NOTE - NSCHARTNOTEFT_GEN_A_CORE
Nutrition Follow Up Note  Hospital Course   (Per Electronic Medical Record)    Source:  Patient [X]  Nursing Staff [X]   Medical Record [X]      Diet: Diet, Minced and Moist (10-31-24 @ 13:36) [Active]      Patient is [place] speaking. Follow up interview conducted using [employer] via telephone (ID#: [id number]). At this time patient tolerating current diet w/ poor appetite/intake consuming 0-50% of meals. At this time patient is amenable to Ensure Plus High Protein Daily 8 oz (Provides 350 kcal, 20 grams of protein) TID to assist patient in meeting estimated energy requirements. Reviewed preferences and menu alternatives, food preferences obtained and noted on patients file with nutrition office. Per SLP patient is ok to consume white rice and minced steamed broccoli. + polyethylene glycol and Senna to assist in BM, Last BM 11/6 Per nursing flowsheets.      Current Weight: 132.4lb on 10/31      Pertinent Medications: MEDICATIONS  (STANDING):  aMIOdarone    Tablet 100 milliGRAM(s) Oral daily  apixaban 5 milliGRAM(s) Oral two times a day  aspirin  chewable 81 milliGRAM(s) Oral daily  levETIRAcetam 250 milliGRAM(s) Oral two times a day  melatonin 9 milliGRAM(s) Oral at bedtime  mirtazapine 7.5 milliGRAM(s) Oral at bedtime  pantoprazole    Tablet 40 milliGRAM(s) Oral before breakfast  rosuvastatin 20 milliGRAM(s) Oral at bedtime  senna 2 Tablet(s) Oral at bedtime    MEDICATIONS  (PRN):  acetaminophen     Tablet .. 650 milliGRAM(s) Oral every 6 hours PRN Temp greater or equal to 38C (100.4F), Mild Pain (1 - 3)  polyethylene glycol 3350 17 Gram(s) Oral at bedtime PRN Constipation      Pertinent Labs:  11-07 Na140 mmol/L Glu 79 mg/dL K+ 4.0 mmol/L Cr  1.19 mg/dL BUN 33 mg/dL[H] 11-07 Alb 3.1 g/dL[L]        Skin: No pressure injury per nursing flowsheets    Edema: No edema noted per nursing flowsheet    Last Bowel Movement: on 11/6    Estimated Needs:   [X] No Change Since Previous Assessment    Previous Nutrition Diagnosis:   Malnutrition...  Moderate, acute  related to inadequate protein-energy intake s/p CVA, acute hospitalization, dislike of institutional foods  as evidenced by mild muscle wasting/fat loss, pt reports consuming <75% > 7 days    New Nutrition Diagnosis: [X] Not Applicable      Interventions:   1. Recommend continuing with current plan of care, diet consistency per SLP  2. Encourage PO intake  3. Ensure Plus High Protein Daily 8 oz (Provides 350 kcal, 20 grams of protein) TID   4. Obtain and honor food preferences as able  5. Ongoing diet education     Monitoring & Evaluation:   [X] Weights   [X] PO Intake   [X] Skin Integrity   [X] Follow Up (Per Protocol)  [X] Tolerance to Diet Prescription   [X] Other: Labs    Registered Dietitian/Nutritionist Remains Available.  Carline Lopez RD    Phone# [phone]

## 2024-11-09 PROCEDURE — 93010 ELECTROCARDIOGRAM REPORT: CPT

## 2024-11-09 PROCEDURE — 99232 SBSQ HOSP IP/OBS MODERATE 35: CPT

## 2024-11-09 RX ADMIN — PANTOPRAZOLE SODIUM 40 MILLIGRAM(S): 40 TABLET, DELAYED RELEASE ORAL at 05:36

## 2024-11-09 RX ADMIN — AMIODARONE HYDROCHLORIDE 100 MILLIGRAM(S): 200 TABLET ORAL at 08:49

## 2024-11-09 RX ADMIN — LEVETIRACETAM 250 MILLIGRAM(S): 1000 TABLET ORAL at 05:37

## 2024-11-09 RX ADMIN — ROSUVASTATIN CALCIUM 20 MILLIGRAM(S): 5 TABLET, FILM COATED ORAL at 20:02

## 2024-11-09 RX ADMIN — ACETAMINOPHEN, DIPHENHYDRAMINE HCL, PHENYLEPHRINE HCL 9 MILLIGRAM(S): 325; 25; 5 TABLET ORAL at 20:00

## 2024-11-09 RX ADMIN — LEVETIRACETAM 250 MILLIGRAM(S): 1000 TABLET ORAL at 17:46

## 2024-11-09 RX ADMIN — APIXABAN 5 MILLIGRAM(S): 2.5 TABLET, FILM COATED ORAL at 05:36

## 2024-11-09 RX ADMIN — MIRTAZAPINE 7.5 MILLIGRAM(S): 15 TABLET, FILM COATED ORAL at 20:01

## 2024-11-09 RX ADMIN — APIXABAN 5 MILLIGRAM(S): 2.5 TABLET, FILM COATED ORAL at 17:46

## 2024-11-09 RX ADMIN — Medication 81 MILLIGRAM(S): at 14:23

## 2024-11-09 NOTE — PROGRESS NOTE ADULT - SUBJECTIVE AND OBJECTIVE BOX
rehab follow up note  CC: CVA    no new complaints  seen earlier this morning, tired      REVIEW OF SYSTEMS  Constitutional - No fever,  +fatigue  Neurological - No headaches, No loss of strength  Musculoskeletal - No joint pain, No joint swelling, No muscle pain    VITALS  T(C): 36.6 (11-09-24 @ 08:40), Max: 36.6 (11-09-24 @ 08:40)  HR: 60 (11-09-24 @ 08:40) (50 - 60)  BP: 152/71 (11-09-24 @ 08:40) (143/75 - 153/77)  RR: 16 (11-09-24 @ 08:40) (16 - 16)  SpO2: 98% (11-09-24 @ 08:40) (98% - 98%)  Wt(kg): --       MEDICATIONS   acetaminophen     Tablet .. 650 milliGRAM(s) every 6 hours PRN  aMIOdarone    Tablet 100 milliGRAM(s) daily  apixaban 5 milliGRAM(s) two times a day  aspirin  chewable 81 milliGRAM(s) daily  levETIRAcetam 250 milliGRAM(s) two times a day  melatonin 9 milliGRAM(s) at bedtime  mirtazapine 7.5 milliGRAM(s) at bedtime  pantoprazole    Tablet 40 milliGRAM(s) before breakfast  polyethylene glycol 3350 17 Gram(s) at bedtime PRN  rosuvastatin 20 milliGRAM(s) at bedtime  senna 2 Tablet(s) at bedtime      RECENT LABS/IMAGING                < from: CT Head No Cont (11.01.24 @ 16:01) >    IMPRESSION: Lucency right basal ganglia and right frontal temporal cortex   consistent with a subacute right middle cerebral artery infarct unchanged   since 10/31/2024. No hemorrhage.      < end of copied text >          ---------  PHYSICAL EXAM  Constitutional - NAD, Comfortable, in bed   Pulm - Breathing comfortably on room air   Extremities - No edema, No calf tenderness  Neurologic Exam -                    Cognitive - lethargic      Communication - responds to question      Motor - moves all ext      Sensory - Intact to LT  Psychiatric - Mood WNL, Affect WNL    ASSESSMENT/PLAN  73y Male h/o HTN, afib lung adenocarcinoma s/p VATS lobectomy 10/8 with functional deficits after CVA  right MCA infarct, s/p thrombectomy, CT stable   seizure on keppra, reduced 11/8  asymptomatic bradycardia, continue to monitor, amiodarone reduced, cardiology consulted  Continue current medical management  Pain - Tylenol PRN  DVT PPX - eliquis     Continue 3hrs a day of comprehensive rehab program.       35 minutes spent reviewing hospital course, relevant imaging, therapy notes, consultant notes, labs, imaging, examining patient, discussion with nurses/rehab team

## 2024-11-09 NOTE — PROGRESS NOTE ADULT - ASSESSMENT
73 year old male with PMH of HTN, atrial fibrillation (on eliquis), former smoker x 34 years presented to [facility] on 10/17 with right ICA occlusion and right MCA infarct s/p thrombectomy TICI 3. Discharged from [facility] day before CVA for left pulmonary nodule s/p Left lung lobectomy s/p chest tube. Had been off blood thinners (10/2-10/16)  prior to surgery, then noted to by in AFIB on day of [facility] discharge so was loaded with amiodarone and restarted on Eliquis and Brilinta. Etiology of stroke cardio embolic due to Afib off AC due to recent surgery (10/2-10/16) vs hypercoagulability due to malignancy. Lung path showing adenocarcinoma, no hemo/onc workup started.  Admitted for multidisciplinary rehab program on 10/31.    #R MCA infarct s/p thrombectomy TICI 3  -Right ICA occlusion after the bifurcation extending to the supraclinoid ICA and right MCA, noted E/ICAD including L-ICA cavernous/supraclinoid segment mutlifocal stenosis, left VA mild to moderate stenosis.   -s/p thrombectomy 10/17  -Etiology of stroke cardio embolic due to Afib off AC due to recent surgery (10/2-10/16) vs hypercoagulability due to malignancy  -CTH 10/31 stable, no hemorrhage  -Continue seizure PPx: Keppra   -Continue Crestor   -Continue ASA 81 mg daily  -Continue Eliquis    #PAF  #Asymptomatic Sinus Bradycardia  -EKG 11/3 showed sinus bradycardia  -repeat EKG with improving rate  -vitals trend with improving HR  -Continue Amiodarone - consider cardio consult for bradycardia   -metoprolol discontinued given bradycardia  -Continue Eliquis 5mg BID     #HTN/HLD  -monitor bp off bb  -Continue Crestor     #Lung Adenocarcinoma s/p VATS lobectomy on 10/8  #Small loculated L pleural effusion post-op  -Follow up with [facility] heme/onc already established  -No treatment while in patient    #Pancreatic cyst, incidental finding on CT CAP  -Follow up with PCP for Pancreatic protocol imaging every 2 years for 10 years    #Transaminitis (Stable)  -Monitor CMP, c/w Crestor for now   #GI ppx - PPI    #DVT prophylaxis  -Eliquis 5mg BID

## 2024-11-09 NOTE — PROGRESS NOTE ADULT - SUBJECTIVE AND OBJECTIVE BOX
Patient seen and examined at bedside. comfortable appearing. rates improving off metoprolol      ALLERGIES:  No Known Allergies    MEDICATIONS  (STANDING):  aMIOdarone    Tablet 100 milliGRAM(s) Oral daily  apixaban 5 milliGRAM(s) Oral two times a day  aspirin  chewable 81 milliGRAM(s) Oral daily  levETIRAcetam 250 milliGRAM(s) Oral two times a day  melatonin 9 milliGRAM(s) Oral at bedtime  mirtazapine 7.5 milliGRAM(s) Oral at bedtime  pantoprazole    Tablet 40 milliGRAM(s) Oral before breakfast  rosuvastatin 20 milliGRAM(s) Oral at bedtime  senna 2 Tablet(s) Oral at bedtime    MEDICATIONS  (PRN):  acetaminophen     Tablet .. 650 milliGRAM(s) Oral every 6 hours PRN Temp greater or equal to 38C (100.4F), Mild Pain (1 - 3)  polyethylene glycol 3350 17 Gram(s) Oral at bedtime PRN Constipation    Vital Signs Last 24 Hrs  T(F): 97.9 (09 Nov 2024 08:40), Max: 97.9 (09 Nov 2024 08:40)  HR: 60 (09 Nov 2024 08:40) (50 - 60)  BP: 152/71 (09 Nov 2024 08:40) (143/75 - 153/77)  RR: 16 (09 Nov 2024 08:40) (16 - 16)  SpO2: 98% (09 Nov 2024 08:40) (98% - 98%)  I&O's Summary      PHYSICAL EXAM:    Gen: nad, resting in bed  Neuro: aaox3, no focal deficits  Heent: eomi b/l, no jvd, no oral exudates  Pulm: cta b/l, no w/r/r  CV: +s1s2, no m/r/g  Ab: soft, nt/nd, normoactive bs x 4  Extrem: no edema, pulses intact and equal  Skin: no rashes  Psych: normal    LABS:                        12.4   4.09  )-----------( 156      ( 07 Nov 2024 05:56 )             36.9       11-07    140  |  104  |  33  ----------------------------<  79  4.0   |  27  |  1.19    Ca    9.3      07 Nov 2024 05:56    TPro  6.4  /  Alb  3.1  /  TBili  0.7  /  DBili  x   /  AST  63  /  ALT  118  /  AlkPhos  81  11-07       Urinalysis Basic - ( 07 Nov 2024 05:56 )    Color: x / Appearance: x / SG: x / pH: x  Gluc: 79 mg/dL / Ketone: x  / Bili: x / Urobili: x   Blood: x / Protein: x / Nitrite: x   Leuk Esterase: x / RBC: x / WBC x   Sq Epi: x / Non Sq Epi: x / Bacteria: x        COVID-19 PCR: NotDetec (10-31-24 @ 17:56)      RADIOLOGY & ADDITIONAL TESTS:    Care Discussed with Consultants/Other Providers:

## 2024-11-10 PROCEDURE — 99232 SBSQ HOSP IP/OBS MODERATE 35: CPT

## 2024-11-10 RX ADMIN — APIXABAN 5 MILLIGRAM(S): 2.5 TABLET, FILM COATED ORAL at 17:10

## 2024-11-10 RX ADMIN — AMIODARONE HYDROCHLORIDE 100 MILLIGRAM(S): 200 TABLET ORAL at 05:12

## 2024-11-10 RX ADMIN — Medication 2 TABLET(S): at 20:54

## 2024-11-10 RX ADMIN — Medication 81 MILLIGRAM(S): at 11:28

## 2024-11-10 RX ADMIN — LEVETIRACETAM 250 MILLIGRAM(S): 1000 TABLET ORAL at 17:09

## 2024-11-10 RX ADMIN — PANTOPRAZOLE SODIUM 40 MILLIGRAM(S): 40 TABLET, DELAYED RELEASE ORAL at 05:12

## 2024-11-10 RX ADMIN — ROSUVASTATIN CALCIUM 20 MILLIGRAM(S): 5 TABLET, FILM COATED ORAL at 20:55

## 2024-11-10 RX ADMIN — APIXABAN 5 MILLIGRAM(S): 2.5 TABLET, FILM COATED ORAL at 05:12

## 2024-11-10 RX ADMIN — ACETAMINOPHEN, DIPHENHYDRAMINE HCL, PHENYLEPHRINE HCL 9 MILLIGRAM(S): 325; 25; 5 TABLET ORAL at 20:54

## 2024-11-10 RX ADMIN — LEVETIRACETAM 250 MILLIGRAM(S): 1000 TABLET ORAL at 05:11

## 2024-11-10 RX ADMIN — MIRTAZAPINE 7.5 MILLIGRAM(S): 15 TABLET, FILM COATED ORAL at 20:54

## 2024-11-10 NOTE — PROGRESS NOTE ADULT - SUBJECTIVE AND OBJECTIVE BOX
rehab follow up note  CC: CVA    no new complaints       REVIEW OF SYSTEMS  Constitutional - No fever,  No fatigue  Neurological - No headaches, No loss of strength  Musculoskeletal - No joint pain, No joint swelling, No muscle pain    VITALS  T(C): 36.7 (11-10-24 @ 08:25), Max: 37.1 (11-09-24 @ 20:00)  HR: 64 (11-10-24 @ 08:25) (54 - 67)  BP: 159/87 (11-10-24 @ 08:25) (147/77 - 159/87)  RR: 16 (11-10-24 @ 08:25) (16 - 16)  SpO2: 98% (11-10-24 @ 08:25) (97% - 98%)  Wt(kg): --       MEDICATIONS   acetaminophen     Tablet .. 650 milliGRAM(s) every 6 hours PRN  aMIOdarone    Tablet 100 milliGRAM(s) daily  apixaban 5 milliGRAM(s) two times a day  aspirin  chewable 81 milliGRAM(s) daily  levETIRAcetam 250 milliGRAM(s) two times a day  melatonin 9 milliGRAM(s) at bedtime  mirtazapine 7.5 milliGRAM(s) at bedtime  pantoprazole    Tablet 40 milliGRAM(s) before breakfast  polyethylene glycol 3350 17 Gram(s) at bedtime PRN  rosuvastatin 20 milliGRAM(s) at bedtime  senna 2 Tablet(s) at bedtime      RECENT LABS/IMAGING                        < from: CT Head No Cont (11.01.24 @ 16:01) >    IMPRESSION: Lucency right basal ganglia and right frontal temporal cortex   consistent with a subacute right middle cerebral artery infarct unchanged   since 10/31/2024. No hemorrhage.      < end of copied text >          ---------  PHYSICAL EXAM  Constitutional - NAD, Comfortable, in chair   Pulm - Breathing comfortably on room air   Extremities - No edema, No calf tenderness  Neurologic Exam -                    Cognitive - awake, alert      Communication - responds to questions      Motor - moves all ext      Sensory - Intact to LT  Psychiatric - Mood WNL, Affect WNL    ASSESSMENT/PLAN  73y Male h/o HTN, afib lung adenocarcinoma s/p VATS lobectomy 10/8 with functional deficits after CVA  right MCA infarct, s/p thrombectomy, CT stable   seizure on keppra, reduced 11/8  asymptomatic bradycardia, continue to monitor, amiodarone reduced, cardiology consulted  Continue current medical management  Pain - Tylenol PRN  DVT PPX - eliquis     Continue 3hrs a day of comprehensive rehab program.       35 minutes spent reviewing hospital course, relevant imaging, therapy notes, consultant notes, labs, imaging, examining patient, discussion with nurses/rehab team

## 2024-11-10 NOTE — PROGRESS NOTE ADULT - ASSESSMENT
73 year old male with PMH of HTN, atrial fibrillation (on eliquis), former smoker x 34 years presented to [facility] on 10/17 with right ICA occlusion and right MCA infarct s/p thrombectomy TICI 3. Discharged from [facility] day before CVA for left pulmonary nodule s/p Left lung lobectomy s/p chest tube. Had been off blood thinners (10/2-10/16)  prior to surgery, then noted to by in AFIB on day of [facility] discharge so was loaded with amiodarone and restarted on Eliquis and Brilinta. Etiology of stroke cardio embolic due to Afib off AC due to recent surgery (10/2-10/16) vs hypercoagulability due to malignancy. Lung path showing adenocarcinoma, no hemo/onc workup started.  Admitted for multidisciplinary rehab program on 10/31.    #R MCA infarct s/p thrombectomy TICI 3  -Right ICA occlusion after the bifurcation extending to the supraclinoid ICA and right MCA, noted E/ICAD including L-ICA cavernous/supraclinoid segment mutlifocal stenosis, left VA mild to moderate stenosis.   -s/p thrombectomy 10/17  -Etiology of stroke cardio embolic due to Afib off AC due to recent surgery (10/2-10/16) vs hypercoagulability due to malignancy  -CTH 10/31 stable, no hemorrhage  -Continue seizure PPx: Keppra   -Continue Crestor   -Continue ASA 81 mg daily  -Continue Eliquis    #PAF  #Asymptomatic Sinus Bradycardia  -EKG 11/3 showed sinus bradycardia  -repeat EKG with improving rate  -vitals trend with improving HR  -Continue Amiodarone - consider cardio consult for bradycardia   -metoprolol discontinued given bradycardia  -Continue Eliquis 5mg BID     #HTN/HLD  -monitor bp off bb  -Continue Crestor     #Lung Adenocarcinoma s/p VATS lobectomy on 10/8  #Small loculated L pleural effusion post-op  -Follow up with [facility] heme/onc already established  -No treatment while in patient    #Pancreatic cyst, incidental finding on CT CAP  -Follow up with PCP for Pancreatic protocol imaging every 2 years for 10 years    #Transaminitis (Stable)  -Monitor CMP, c/w Crestor for now   #GI ppx - PPI    #DVT prophylaxis  -Eliquis 5mg BID

## 2024-11-10 NOTE — PROGRESS NOTE ADULT - SUBJECTIVE AND OBJECTIVE BOX
Patient seen and examined at bedside.      ALLERGIES:  No Known Allergies    MEDICATIONS  (STANDING):  aMIOdarone    Tablet 100 milliGRAM(s) Oral daily  apixaban 5 milliGRAM(s) Oral two times a day  aspirin  chewable 81 milliGRAM(s) Oral daily  levETIRAcetam 250 milliGRAM(s) Oral two times a day  melatonin 9 milliGRAM(s) Oral at bedtime  mirtazapine 7.5 milliGRAM(s) Oral at bedtime  pantoprazole    Tablet 40 milliGRAM(s) Oral before breakfast  rosuvastatin 20 milliGRAM(s) Oral at bedtime  senna 2 Tablet(s) Oral at bedtime    MEDICATIONS  (PRN):  acetaminophen     Tablet .. 650 milliGRAM(s) Oral every 6 hours PRN Temp greater or equal to 38C (100.4F), Mild Pain (1 - 3)  polyethylene glycol 3350 17 Gram(s) Oral at bedtime PRN Constipation    Vital Signs Last 24 Hrs  T(F): 98 (10 Nov 2024 08:25), Max: 98.7 (09 Nov 2024 20:00)  HR: 64 (10 Nov 2024 08:25) (54 - 67)  BP: 159/87 (10 Nov 2024 08:25) (147/77 - 159/87)  RR: 16 (10 Nov 2024 08:25) (16 - 16)  SpO2: 98% (10 Nov 2024 08:25) (97% - 98%)  I&O's Summary      PHYSICAL EXAM:    Gen: nad, resting in bed  Neuro: aaox3, no focal deficits  Heent: eomi b/l, no jvd, no oral exudates  Pulm: cta b/l, no w/r/r  CV: +s1s2, no m/r/g  Ab: soft, nt/nd, normoactive bs x 4  Extrem: no edema, pulses intact and equal  Skin: no rashes  Psych: normal    LABS:                                              COVID-19 PCR: NotDetec (10-31-24 @ 17:56)      RADIOLOGY & ADDITIONAL TESTS:    Care Discussed with Consultants/Other Providers:

## 2024-11-11 LAB
ALBUMIN SERPL ELPH-MCNC: 3.3 G/DL — SIGNIFICANT CHANGE UP (ref 3.3–5)
ALP SERPL-CCNC: 91 U/L — SIGNIFICANT CHANGE UP (ref 40–120)
ALT FLD-CCNC: 126 U/L — HIGH (ref 10–45)
ANION GAP SERPL CALC-SCNC: 2 MMOL/L — LOW (ref 5–17)
AST SERPL-CCNC: 65 U/L — HIGH (ref 10–40)
BASOPHILS # BLD AUTO: 0.03 K/UL — SIGNIFICANT CHANGE UP (ref 0–0.2)
BASOPHILS NFR BLD AUTO: 0.6 % — SIGNIFICANT CHANGE UP (ref 0–2)
BILIRUB SERPL-MCNC: 0.6 MG/DL — SIGNIFICANT CHANGE UP (ref 0.2–1.2)
BUN SERPL-MCNC: 30 MG/DL — HIGH (ref 7–23)
CALCIUM SERPL-MCNC: 10.2 MG/DL — SIGNIFICANT CHANGE UP (ref 8.4–10.5)
CHLORIDE SERPL-SCNC: 105 MMOL/L — SIGNIFICANT CHANGE UP (ref 96–108)
CO2 SERPL-SCNC: 33 MMOL/L — HIGH (ref 22–31)
CREAT SERPL-MCNC: 1.23 MG/DL — SIGNIFICANT CHANGE UP (ref 0.5–1.3)
EGFR: 62 ML/MIN/1.73M2 — SIGNIFICANT CHANGE UP
EOSINOPHIL # BLD AUTO: 0.13 K/UL — SIGNIFICANT CHANGE UP (ref 0–0.5)
EOSINOPHIL NFR BLD AUTO: 2.6 % — SIGNIFICANT CHANGE UP (ref 0–6)
GLUCOSE SERPL-MCNC: 102 MG/DL — HIGH (ref 70–99)
HCT VFR BLD CALC: 44.5 % — SIGNIFICANT CHANGE UP (ref 39–50)
HGB BLD-MCNC: 14.9 G/DL — SIGNIFICANT CHANGE UP (ref 13–17)
IMM GRANULOCYTES NFR BLD AUTO: 0.2 % — SIGNIFICANT CHANGE UP (ref 0–0.9)
LYMPHOCYTES # BLD AUTO: 1.12 K/UL — SIGNIFICANT CHANGE UP (ref 1–3.3)
LYMPHOCYTES # BLD AUTO: 22.5 % — SIGNIFICANT CHANGE UP (ref 13–44)
MAGNESIUM SERPL-MCNC: 1.9 MG/DL — SIGNIFICANT CHANGE UP (ref 1.6–2.6)
MCHC RBC-ENTMCNC: 30.2 PG — SIGNIFICANT CHANGE UP (ref 27–34)
MCHC RBC-ENTMCNC: 33.5 G/DL — SIGNIFICANT CHANGE UP (ref 32–36)
MCV RBC AUTO: 90.3 FL — SIGNIFICANT CHANGE UP (ref 80–100)
MONOCYTES # BLD AUTO: 0.68 K/UL — SIGNIFICANT CHANGE UP (ref 0–0.9)
MONOCYTES NFR BLD AUTO: 13.7 % — SIGNIFICANT CHANGE UP (ref 2–14)
NEUTROPHILS # BLD AUTO: 3 K/UL — SIGNIFICANT CHANGE UP (ref 1.8–7.4)
NEUTROPHILS NFR BLD AUTO: 60.4 % — SIGNIFICANT CHANGE UP (ref 43–77)
NRBC # BLD: 0 /100 WBCS — SIGNIFICANT CHANGE UP (ref 0–0)
PLATELET # BLD AUTO: 130 K/UL — LOW (ref 150–400)
POTASSIUM SERPL-MCNC: 4.1 MMOL/L — SIGNIFICANT CHANGE UP (ref 3.5–5.3)
POTASSIUM SERPL-SCNC: 4.1 MMOL/L — SIGNIFICANT CHANGE UP (ref 3.5–5.3)
PROT SERPL-MCNC: 7.1 G/DL — SIGNIFICANT CHANGE UP (ref 6–8.3)
RBC # BLD: 4.93 M/UL — SIGNIFICANT CHANGE UP (ref 4.2–5.8)
RBC # FLD: 13.9 % — SIGNIFICANT CHANGE UP (ref 10.3–14.5)
SODIUM SERPL-SCNC: 140 MMOL/L — SIGNIFICANT CHANGE UP (ref 135–145)
WBC # BLD: 4.97 K/UL — SIGNIFICANT CHANGE UP (ref 3.8–10.5)
WBC # FLD AUTO: 4.97 K/UL — SIGNIFICANT CHANGE UP (ref 3.8–10.5)

## 2024-11-11 PROCEDURE — 99233 SBSQ HOSP IP/OBS HIGH 50: CPT

## 2024-11-11 PROCEDURE — 93010 ELECTROCARDIOGRAM REPORT: CPT

## 2024-11-11 RX ORDER — METOPROLOL TARTRATE 100 MG/1
12.5 TABLET, FILM COATED ORAL
Refills: 0 | Status: DISCONTINUED | OUTPATIENT
Start: 2024-11-11 | End: 2024-11-15

## 2024-11-11 RX ADMIN — AMIODARONE HYDROCHLORIDE 100 MILLIGRAM(S): 200 TABLET ORAL at 05:44

## 2024-11-11 RX ADMIN — ACETAMINOPHEN, DIPHENHYDRAMINE HCL, PHENYLEPHRINE HCL 9 MILLIGRAM(S): 325; 25; 5 TABLET ORAL at 21:29

## 2024-11-11 RX ADMIN — APIXABAN 5 MILLIGRAM(S): 2.5 TABLET, FILM COATED ORAL at 05:44

## 2024-11-11 RX ADMIN — PANTOPRAZOLE SODIUM 40 MILLIGRAM(S): 40 TABLET, DELAYED RELEASE ORAL at 05:44

## 2024-11-11 RX ADMIN — METOPROLOL TARTRATE 12.5 MILLIGRAM(S): 100 TABLET, FILM COATED ORAL at 17:36

## 2024-11-11 RX ADMIN — APIXABAN 5 MILLIGRAM(S): 2.5 TABLET, FILM COATED ORAL at 17:36

## 2024-11-11 RX ADMIN — Medication 81 MILLIGRAM(S): at 11:32

## 2024-11-11 RX ADMIN — Medication 2 TABLET(S): at 21:29

## 2024-11-11 RX ADMIN — LEVETIRACETAM 250 MILLIGRAM(S): 1000 TABLET ORAL at 17:36

## 2024-11-11 RX ADMIN — MIRTAZAPINE 7.5 MILLIGRAM(S): 15 TABLET, FILM COATED ORAL at 21:28

## 2024-11-11 RX ADMIN — LEVETIRACETAM 250 MILLIGRAM(S): 1000 TABLET ORAL at 05:45

## 2024-11-11 RX ADMIN — ROSUVASTATIN CALCIUM 20 MILLIGRAM(S): 5 TABLET, FILM COATED ORAL at 21:29

## 2024-11-11 NOTE — PROGRESS NOTE ADULT - ASSESSMENT
72 yo right handed zhou-speaking M with PMHX HTN, atrial fibrillation, former smoker x 34 years presented to [facility] on 10/17 with right ICA occlusion and right MCA infarct s/p thrombectomy TICI 3. Discharged from [facility] day before CVA for left pulmonary nodule s/p Left lung lobectomy s/p chest tube. Had been off blood thinners (10/2-10/16)  prior to surgery, then noted to by in AFIB on day of [facility] discharge so was loaded with amiodarone and restarted on Eliquis and Brilinta. Etiology of stroke cardio embolic due to Afib off AC due to recent surgery (10/2-10/16) vs hypercoagulability due to malignancy. Lung path showing adenocarcinoma, no hemo/onc workup started.  Admitted for multidisciplinary rehab program on 10/31.    #R MCA infarct s/p  thrombectomy TICI 3  - right ICA occlusion after the bifurcation extending to the supraclinoid ICA and right MCA, noted E/ICAD including L-ICA cavernous/supraclinoid segment multifocal stenosis, left VA mild to moderate stenosis. s/p thrombectomy 10/17  - Etiology of stroke cardio embolic due to Afib off AC due to recent surgery (10/2-10/16) vs hypercoagulability due to malignancy  - CTH 10/31 Stable, no hemorrhage  - Continue Crestor 20 mg nightly   - Continue ASA 81 mg daily  - Continue Eliquis 5mg BID  - Fall and aspiration precautions  - Comprehensive Multidisciplinary Rehab Program: Gait, ADL, Functional impairments PT/OT/ SLP 3 hours a day 5 days a week    #Left facial twitching suspect for partial seizures 11/1  - Ativan 1 mg IV push x1   - Keppra 1 g IV load and continue 500 mg BID orally - reducing to 250 mg BID on 11/8  - CT head urgent - stable   - EEG urgent -stable   - Prolactin, lactate -WNL   - Per family this started about 10 years prior after a [place] "[place]" [place], for which he did not follow up with neurology. 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 In consideration of the MRI picture (significant cortical involvement) will taper Keppra to 250 mg BID     #PAF  #Noted bradycardia on 11/1 now improved   - Amiodarone 100mg daily as per cardiology -to continue to monitor HR   - Resume Metoprolol 12.5 mg BID - 11/11  - ELIQUIS 5mg BID  - EKG stable 11/11  -Cardiology following      #HTN/HLD  - home metoprolol 25 mg BID stopped due to bradycardia   - Continue Crestor 20mg HS  - Monitor BP     #Lung Adenocarcinoma s/p VATS lobectomy on 10/8  #Small loculated L pleural effusion post-op  - Follow up with [facility]/onc already established  - No treatment while in patient    #Pancreatic cyst, incidental finding on CT CAP  - Follow up with PCP for Pancreatic protocol imaging every 2 years for 10 years.     #Mood / Cognition:  - Neuropsych evaluation     #Sleep/mood  - Maintain quiet hours and low stim environment  - Melatonin 9mg at bedtime 11/8  -Continue Remeron 7.5mg daily 11/8 -to assist with mood/sleep/appetite     #Pain:  - Tylenol PRN  - avoid sedating meds that may affect cognitive recovery    #GI/Bowel:  - Senna 2 tabs daily  - Miralax prn  - GI ppx: None    #/Bladder:  - Monitor PVR if no void in 8h; SC for >400 cc  - Toileting schedule q4h    #Diet / Dysphagia:    - Diet: Minced and Moist, thin liquids  - ongoing SLP assessment  - Nutrition to follow    #Skin/ Pressure Injury Prevention:  - assessment on admission: Mid abd scar, L upper flank incision sites x 4 , dry skin generalized, R foot lateral callus   - Turn Q2hrs in bed while awake, OOB to Chair, PT/OT/SLP     #DVT prophylaxis:  -Eliquis 5mg BID  - TEDs    #Precautions/ Restrictions  - Falls,   - Lungs: Aspiration, Incentive Spirometer      --------------------------------------------  Outpatient Follow up:    Vitaly Dias MD  Internal medicine  [address] [place] [address] [phone]    Keke Lofton MD  [facility]  [address] [place] [address] [phone]  --------------------------------------------

## 2024-11-11 NOTE — PROGRESS NOTE ADULT - ASSESSMENT
73 year old male with PMH of HTN, atrial fibrillation (on eliquis), former smoker x 34 years presented to [facility] on 10/17 with right ICA occlusion and right MCA infarct s/p thrombectomy TICI 3. Discharged from [facility] day before CVA for left pulmonary nodule s/p Left lung lobectomy s/p chest tube. Had been off blood thinners (10/2-10/16)  prior to surgery, then noted to by in AFIB on day of [facility] discharge so was loaded with amiodarone and restarted on Eliquis and Brilinta. Etiology of stroke cardio embolic due to Afib off AC due to recent surgery (10/2-10/16) vs hypercoagulability due to malignancy. Lung path showing adenocarcinoma, no hemo/onc workup started.  Admitted for multidisciplinary rehab program on 10/31.    #R MCA infarct s/p thrombectomy TICI 3  -Right ICA occlusion after the bifurcation extending to the supraclinoid ICA and right MCA, noted E/ICAD including L-ICA cavernous/supraclinoid segment mutlifocal stenosis, left VA mild to moderate stenosis.   -s/p thrombectomy 10/17  -Etiology of stroke cardio embolic due to Afib off AC due to recent surgery (10/2-10/16) vs hypercoagulability due to malignancy  -CTH 10/31 stable, no hemorrhage  -Continue seizure PPx: Keppra   -Continue Crestor   -Continue ASA 81 mg daily  -Continue Eliquis    #PAF  #Asymptomatic Sinus Bradycardia, now tachycardia [ upto 127 at rest]  on PT 11/11  -EKG 11/3 showed sinus bradycardia  - F/u Repeat EKG today  - resumed metoprolol tart 12.5 mg BID with holding SBP<100 or HR<55 on  11/11  - Amiodarone was reduced from 200 mg daily to 100 mg daily per cardio. on 11/7 for bradycardia  - Continue Eliquis 5mg BID  - Patient will need yearly checks of LFTs, thyroid, EKG and lungs/PFTs for surveillance to assess for amiodarone toxicity. TSH now normal  - Keep K >4 and Mg>2  - cardio cx noted and appreciated     #HTN  - resumed Metoprolol Tart 12.5 mg BID. titrate up as able  - DASh  - monitor VS including OH periodically    #HLD  -Continue Crestor     #Lung Adenocarcinoma s/p VATS lobectomy on 10/8  #Small loculated L pleural effusion post-op  -Follow up with [facility] heme/onc already established  -No treatment while in patient    #Pancreatic cyst, incidental finding on CT CAP  -Follow up with PCP for Pancreatic protocol imaging every 2 years for 10 years    #Transaminitis (Stable)  -Monitor CMP, c/w Crestor for now   #GI ppx - PPI    #DVT prophylaxis  -Eliquis 5mg BID    d/w Rehab team at [facility]   73 year old male with PMH of HTN, atrial fibrillation (on eliquis), former smoker x 34 years presented to [facility] on 10/17 with right ICA occlusion and right MCA infarct s/p thrombectomy TICI 3. Discharged from [facility] day before CVA for left pulmonary nodule s/p Left lung lobectomy s/p chest tube. Had been off blood thinners (10/2-10/16)  prior to surgery, then noted to by in AFIB on day of [facility] discharge so was loaded with amiodarone and restarted on Eliquis and Brilinta. Etiology of stroke cardio embolic due to Afib off AC due to recent surgery (10/2-10/16) vs hypercoagulability due to malignancy. Lung path showing adenocarcinoma, no hemo/onc workup started.  Admitted for multidisciplinary rehab program on 10/31.    #R MCA infarct s/p thrombectomy TICI 3  -Right ICA occlusion after the bifurcation extending to the supraclinoid ICA and right MCA, noted E/ICAD including L-ICA cavernous/supraclinoid segment mutlifocal stenosis, left VA mild to moderate stenosis.   -s/p thrombectomy 10/17  -Etiology of stroke cardio embolic due to Afib off AC due to recent surgery (10/2-10/16) vs hypercoagulability due to malignancy  -CTH 10/31 stable, no hemorrhage  -Continue seizure PPx: Keppra   -Continue Crestor   -Continue ASA 81 mg daily  -Continue Eliquis    #PAF  #Asymptomatic Sinus Bradycardia, now tachycardia [ upto 127 at rest]  on PT 11/11  -EKG 11/3 showed sinus bradycardia  - Repeat EKG today reviewed by me: after few minutes rest: NSR@84 bpm. looks similar to repvious EKG except HR is better. f/u final report.   - resumed metoprolol tart 12.5 mg BID with holding SBP<100 or HR<55 on  11/11  - Amiodarone was reduced from 200 mg daily to 100 mg daily per cardio. on 11/7 for bradycardia  - Continue Eliquis 5mg BID  - Patient will need yearly checks of LFTs, thyroid, EKG and lungs/PFTs for surveillance to assess for amiodarone toxicity. TSH now normal  - Keep K >4 and Mg>2  - cardio cx noted and appreciated     #HTN  - resumed Metoprolol Tart 12.5 mg BID. titrate up as able  - DASh  - monitor VS including OH periodically    #HLD  -Continue Crestor     #Lung Adenocarcinoma s/p VATS lobectomy on 10/8  #Small loculated L pleural effusion post-op  -Follow up with [facility] heme/onc already established  -No treatment while in patient    #Pancreatic cyst, incidental finding on CT CAP  -Follow up with PCP for Pancreatic protocol imaging every 2 years for 10 years    #Transaminitis (Stable)  -Monitor CMP, c/w Crestor for now   #GI ppx - PPI    #DVT prophylaxis  -Eliquis 5mg BID    d/w Rehab team at [facility]

## 2024-11-11 NOTE — PROGRESS NOTE ADULT - SUBJECTIVE AND OBJECTIVE BOX
Medicine Progress Note    Patient is a 73y old  Male who presents with a chief complaint of R-MCA CVA (10 Nov 2024 10:04)      SUBJECTIVE / OVERNIGHT EVENTS:  seen and examined  Chart reviewed  No overnight events  Limb weakness improving with therapy  BM+  No pain  No complaints   in PT before therapy. resumed low dose BB.       ROS:  limited. no answer    MEDICATIONS  (STANDING):  aMIOdarone    Tablet 100 milliGRAM(s) Oral daily  apixaban 5 milliGRAM(s) Oral two times a day  aspirin  chewable 81 milliGRAM(s) Oral daily  levETIRAcetam 250 milliGRAM(s) Oral two times a day  melatonin 9 milliGRAM(s) Oral at bedtime  metoprolol tartrate 12.5 milliGRAM(s) Oral two times a day  mirtazapine 7.5 milliGRAM(s) Oral at bedtime  pantoprazole    Tablet 40 milliGRAM(s) Oral before breakfast  rosuvastatin 20 milliGRAM(s) Oral at bedtime  senna 2 Tablet(s) Oral at bedtime    MEDICATIONS  (PRN):  acetaminophen     Tablet .. 650 milliGRAM(s) Oral every 6 hours PRN Temp greater or equal to 38C (100.4F), Mild Pain (1 - 3)  polyethylene glycol 3350 17 Gram(s) Oral at bedtime PRN Constipation    CAPILLARY BLOOD GLUCOSE        I&O's Summary      PHYSICAL EXAM:  Vital Signs Last 24 Hrs  T(C): 36.4 (11 Nov 2024 07:38), Max: 36.9 (10 Nov 2024 20:43)  T(F): 97.6 (11 Nov 2024 07:38), Max: 98.5 (10 Nov 2024 20:43)  HR: 57 (11 Nov 2024 07:38) (56 - 59)  BP: 150/82 (11 Nov 2024 07:38) (138/81 - 152/78)  BP(mean): --  RR: 16 (11 Nov 2024 07:38) (16 - 18)  SpO2: 99% (11 Nov 2024 07:38) (99% - 99%)    Parameters below as of 11 Nov 2024 07:38  Patient On (Oxygen Delivery Method): room air      GENERAL: Not in distress. Alert    HEENT: clear conjuctiva, MMM. no pallor or icterus  CARDIOVASCULAR: Irregular, S1, S2. No murmur/rubs/gallop  LUNGS: BLAE+, no rales, no wheezing, no rhonchi.    ABDOMEN: ND. Soft,  NT, no guarding / rebound / rigidity. BS normoactive  EXTREMITIES: no edema. no leg or calf TP.  SKIN: warm and dry  PSYCHIATRIC: Calm.  No agitation.  CNS: AAO. moves limbs, follows commands    LABS:                        14.9   4.97  )-----------( 130      ( 11 Nov 2024 07:28 )             44.5     11-11    140  |  105  |  30[H]  ----------------------------<  102[H]  4.1   |  33[H]  |  1.23    Ca    10.2      11 Nov 2024 07:28    TPro  7.1  /  Alb  3.3  /  TBili  0.6  /  DBili  x   /  AST  65[H]  /  ALT  126[H]  /  AlkPhos  91  11-11          Urinalysis Basic - ( 11 Nov 2024 07:28 )    Color: x / Appearance: x / SG: x / pH: x  Gluc: 102 mg/dL / Ketone: x  / Bili: x / Urobili: x   Blood: x / Protein: x / Nitrite: x   Leuk Esterase: x / RBC: x / WBC x   Sq Epi: x / Non Sq Epi: x / Bacteria: x        COVID-19 PCR: NotDetec (31 Oct 2024 17:56)      RADIOLOGY & ADDITIONAL TESTS:  Imaging from Last 24 Hours:    Electrocardiogram/QTc Interval:    COORDINATION OF CARE:  Care Discussed with Consultants/Other Providers:

## 2024-11-11 NOTE — PROGRESS NOTE ADULT - SUBJECTIVE AND OBJECTIVE BOX
SUBJECTIVE/ROS: Patient seen in the room and again with therapy. He was noted to have tachycardia to 120s while at rest during therapy and elevated to 127 during exercise. Of note, he is off metoprolol and on reduced amiodarone dose. Reviewed with hospitalist who will address dosing. EKG performed and showed sinus tach. He states that he slept better and has no new complaints. He otherwise denies chest pain, nausea, vomiting, abdominal pain, headache, or BLE pain.      HPI:  73 year old right handed [place]-speaking man with HTN, atrial fibrillation on Eliquis and Amiodarone, on Brilinta, former smoker x 34 years (quit 22months ago) discharged from  [facility] on 10/16 for left pulmonary nodule s/p Left lung lobectomy s/p chest tube presented to [facility] ED (10/17/24) with left sided weakness and left gaze preference. Daughter states patient last known normal at 11 am the last time she saw him well before she left to go to the store. She returned home at 1 pm and noted patient with left sided weakness and called EMS. Daughter confirmed she gave patient his Eliquis this morning. At baseline patient walks without assistive devices. On initial stroke evaluation, NIHSS-18 for patient awake with minimal verbal output right gaze preference not able to cross midline, left facial droop and LUE 0/5. LLE 1/5. CT with no ICH but noted dense right MCA. CTA head/neck with right ICA occlusion after the bifurcation extending to the supraclinoid ICA and right MCA, noted E/ICAD including L-ICA cavernous/supraclinoid segment mutlifocal stenosis, left VA mild to moderate stenosis. Patient not a candidate for TNK as on Eliquis and compliant. Patient underwent thrombectomy TICI 3. Patient admitted to MICU for close observation, required nicardipene drip. Repeat CTH with evolving Right MCA stroke without hemorrhage. Started ASA. Patient downgraded to Stroke Unit (10/19). A 1 c 6.1 (pre-OM), LDL 43 (wnl), TG 69 (wnl). TTE with mildly enlarged left atrium, mild concentric LVH, LV hyperdynamic EF >70%. [facility] CT surgery RAGINI Chacko from Dr. Mark Velez office [phone] reported that patient was on Eliquis and Brilinta prior to admission, admitted to [facility] 10/8 for left lower wedge x2 / lobectomy which required chest tube placement and was removed 10/16 and was told to hold blood thinners 3-7 days prior to admission. On discharge from [facility], EP was consulted as patient was in afib and recommend Amio load and resume Eliquis/Brilinta 10/16. Pathology resulted with adenocarcinoma but pending LN results for staging. No other HemOnc testing has been done. MRI brain with R-MCA territory infarct. Etiology of stroke cardio embolic due to Afib off AC due to recent surgery (10/2-10/16) vs hypercoagulability due to malignancy.      Vital Signs Last 24 Hrs  T(C): 36.4 (11 Nov 2024 07:38), Max: 36.9 (10 Nov 2024 20:43)  T(F): 97.6 (11 Nov 2024 07:38), Max: 98.5 (10 Nov 2024 20:43)  HR: 57 (11 Nov 2024 07:38) (56 - 59)  BP: 150/82 (11 Nov 2024 07:38) (138/81 - 152/78)  BP(mean): --  RR: 16 (11 Nov 2024 07:38) (16 - 18)  SpO2: 99% (11 Nov 2024 07:38) (99% - 99%)    Parameters below as of 11 Nov 2024 07:38  Patient On (Oxygen Delivery Method): room air          PHYSICAL EXAM  Constitutional - NAD, Comfortable in bed   HEENT - NCAT, EOMI  Neck - Supple, No limited ROM  Chest - Breathing comfortably in room air   Cardiovascular - RR - bradycardia improving   Extremities -  No calf tenderness   Neurologic Exam - patient alert, partially oriented to place (hospital in [place]), intermittently oriented to time. Left facial twitching. Language normal. Mild/moderate dysarthria. L facial droop. Moves all limbs against gravity. RUE 3/5 proximally, 1/5 distally, LUE 4/5 proximally, 5/5 distally. Left tactile inattention.         LABS:                          14.9   4.97  )-----------( 130      ( 11 Nov 2024 07:28 )             44.5     11-11    140  |  105  |  30[H]  ----------------------------<  102[H]  4.1   |  33[H]  |  1.23    Ca    10.2      11 Nov 2024 07:28  Mg     1.9     11-11    TPro  7.1  /  Alb  3.3  /  TBili  0.6  /  DBili  x   /  AST  65[H]  /  ALT  126[H]  /  AlkPhos  91  11-11    LIVER FUNCTIONS - ( 11 Nov 2024 07:28 )  Alb: 3.3 g/dL / Pro: 7.1 g/dL / ALK PHOS: 91 U/L / ALT: 126 U/L / AST: 65 U/L / GGT: x                   MEDICATIONS  (STANDING):  aMIOdarone    Tablet 100 milliGRAM(s) Oral daily  apixaban 5 milliGRAM(s) Oral two times a day  aspirin  chewable 81 milliGRAM(s) Oral daily  levETIRAcetam 250 milliGRAM(s) Oral two times a day  melatonin 9 milliGRAM(s) Oral at bedtime  metoprolol tartrate 12.5 milliGRAM(s) Oral two times a day  mirtazapine 7.5 milliGRAM(s) Oral at bedtime  pantoprazole    Tablet 40 milliGRAM(s) Oral before breakfast  rosuvastatin 20 milliGRAM(s) Oral at bedtime  senna 2 Tablet(s) Oral at bedtime    MEDICATIONS  (PRN):  acetaminophen     Tablet .. 650 milliGRAM(s) Oral every 6 hours PRN Temp greater or equal to 38C (100.4F), Mild Pain (1 - 3)  polyethylene glycol 3350 17 Gram(s) Oral at bedtime PRN Constipation

## 2024-11-12 PROBLEM — I48.91 UNSPECIFIED ATRIAL FIBRILLATION: Chronic | Status: ACTIVE | Noted: 2024-10-31

## 2024-11-12 RX ADMIN — LEVETIRACETAM 250 MILLIGRAM(S): 1000 TABLET ORAL at 17:20

## 2024-11-12 RX ADMIN — METOPROLOL TARTRATE 12.5 MILLIGRAM(S): 100 TABLET, FILM COATED ORAL at 17:20

## 2024-11-12 RX ADMIN — PANTOPRAZOLE SODIUM 40 MILLIGRAM(S): 40 TABLET, DELAYED RELEASE ORAL at 05:16

## 2024-11-12 RX ADMIN — Medication 2 TABLET(S): at 20:12

## 2024-11-12 RX ADMIN — ROSUVASTATIN CALCIUM 20 MILLIGRAM(S): 5 TABLET, FILM COATED ORAL at 20:12

## 2024-11-12 RX ADMIN — Medication 81 MILLIGRAM(S): at 11:43

## 2024-11-12 RX ADMIN — LEVETIRACETAM 250 MILLIGRAM(S): 1000 TABLET ORAL at 05:16

## 2024-11-12 RX ADMIN — APIXABAN 5 MILLIGRAM(S): 2.5 TABLET, FILM COATED ORAL at 17:19

## 2024-11-12 RX ADMIN — MIRTAZAPINE 7.5 MILLIGRAM(S): 15 TABLET, FILM COATED ORAL at 20:12

## 2024-11-12 RX ADMIN — AMIODARONE HYDROCHLORIDE 100 MILLIGRAM(S): 200 TABLET ORAL at 05:16

## 2024-11-12 RX ADMIN — APIXABAN 5 MILLIGRAM(S): 2.5 TABLET, FILM COATED ORAL at 05:16

## 2024-11-12 RX ADMIN — METOPROLOL TARTRATE 12.5 MILLIGRAM(S): 100 TABLET, FILM COATED ORAL at 05:16

## 2024-11-12 RX ADMIN — ACETAMINOPHEN, DIPHENHYDRAMINE HCL, PHENYLEPHRINE HCL 9 MILLIGRAM(S): 325; 25; 5 TABLET ORAL at 20:15

## 2024-11-12 NOTE — PROGRESS NOTE ADULT - ASSESSMENT
72 yo right handed zhou-speaking M with PMHX HTN, atrial fibrillation, former smoker x 34 years presented to [facility] on 10/17 with right ICA occlusion and right MCA infarct s/p thrombectomy TICI 3. Discharged from [facility] day before CVA for left pulmonary nodule s/p Left lung lobectomy s/p chest tube. Had been off blood thinners (10/2-10/16)  prior to surgery, then noted to by in AFIB on day of [facility] discharge so was loaded with amiodarone and restarted on Eliquis and Brilinta. Etiology of stroke cardio embolic due to Afib off AC due to recent surgery (10/2-10/16) vs hypercoagulability due to malignancy. Lung path showing adenocarcinoma, no hemo/onc workup started.  Admitted for multidisciplinary rehab program on 10/31.    #R MCA infarct s/p  thrombectomy TICI 3  - right ICA occlusion after the bifurcation extending to the supraclinoid ICA and right MCA, noted E/ICAD including L-ICA cavernous/supraclinoid segment multifocal stenosis, left VA mild to moderate stenosis. s/p thrombectomy 10/17  - Etiology of stroke cardio embolic due to Afib off AC due to recent surgery (10/2-10/16) vs hypercoagulability due to malignancy  - CTH 10/31 Stable, no hemorrhage  - Continue Crestor 20 mg nightly   - Continue ASA 81 mg daily  - Continue Eliquis 5mg BID  - Fall and aspiration precautions  - Comprehensive Multidisciplinary Rehab Program: Gait, ADL, Functional impairments PT/OT/ SLP 3 hours a day 5 days a week    #Left facial twitching suspect for partial seizures 11/1  - Ativan 1 mg IV push x1   - Keppra 1 g IV load and continue 500 mg BID orally - reducing to 250 mg BID on 11/8  - CT head urgent - stable   - EEG urgent -stable   - Prolactin, lactate -WNL   - Per family this started about 10 years prior after a [place] "[place]" [place], for which he did not follow up with neurology. 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 In consideration of the MRI picture (significant cortical involvement) will taper Keppra to 250 mg BID     #PAF  #Noted bradycardia on 11/1 now improved   - Amiodarone 100mg daily as per cardiology -to continue to monitor HR   - Resume Metoprolol 12.5 mg BID - 11/11 - HR improved   - ELIQUIS 5mg BID  - EKG stable 11/11  -Cardiology following      #HTN/HLD  - home metoprolol 25 mg BID stopped due to bradycardia -resumed at 12.5mg BID 11/11 - HR improved   - Continue Crestor 20mg HS  - Monitor BP     #Lung Adenocarcinoma s/p VATS lobectomy on 10/8  #Small loculated L pleural effusion post-op  - Follow up with [facility]/onc already established  - No treatment while in patient    #Pancreatic cyst, incidental finding on CT CAP  - Follow up with PCP for Pancreatic protocol imaging every 2 years for 10 years.     #Mood / Cognition:  - Neuropsych evaluation     #Sleep/mood  - Maintain quiet hours and low stim environment  - Melatonin 9mg at bedtime 11/8  -Continue Remeron 7.5mg daily 11/8 -to assist with mood/sleep/appetite     #Pain:  - Tylenol PRN  - avoid sedating meds that may affect cognitive recovery    #GI/Bowel:  - Senna 2 tabs daily  - Miralax prn  - GI ppx: None    #/Bladder:  - Monitor PVR if no void in 8h; SC for >400 cc  - Toileting schedule q4h    #Diet / Dysphagia:    - Diet: Minced and Moist, thin liquids  - ongoing SLP assessment  - Nutrition to follow    #Skin/ Pressure Injury Prevention:  - assessment on admission: Mid abd scar, L upper flank incision sites x 4 , dry skin generalized, R foot lateral callus   - Turn Q2hrs in bed while awake, OOB to Chair, PT/OT/SLP     #DVT prophylaxis:  -Eliquis 5mg BID  - TEDs    #Precautions/ Restrictions  - Falls,   - Lungs: Aspiration, Incentive Spirometer      --------------------------------------------  Outpatient Follow up:    Vitaly Dias MD  Internal medicine  [address] [place] [address] [phone]    Keke Lofton MD  [facility]  [address] [place] [address] [phone]  --------------------------------------------     74 yo right handed zhou-speaking M with PMHX HTN, atrial fibrillation, former smoker x 34 years presented to [facility] on 10/17 with right ICA occlusion and right MCA infarct s/p thrombectomy TICI 3. Discharged from [facility] day before CVA for left pulmonary nodule s/p Left lung lobectomy s/p chest tube. Had been off blood thinners (10/2-10/16)  prior to surgery, then noted to by in AFIB on day of [facility] discharge so was loaded with amiodarone and restarted on Eliquis and Brilinta. Etiology of stroke cardio embolic due to Afib off AC due to recent surgery (10/2-10/16) vs hypercoagulability due to malignancy. Lung path showing adenocarcinoma, no hemo/onc workup started.  Admitted for multidisciplinary rehab program on 10/31.    #R MCA infarct s/p  thrombectomy TICI 3  - right ICA occlusion after the bifurcation extending to the supraclinoid ICA and right MCA, noted E/ICAD including L-ICA cavernous/supraclinoid segment multifocal stenosis, left VA mild to moderate stenosis. s/p thrombectomy 10/17  - Etiology of stroke cardio embolic due to Afib off AC due to recent surgery (10/2-10/16) vs hypercoagulability due to malignancy  - CTH 10/31 Stable, no hemorrhage  - Continue Crestor 20 mg nightly   - Continue ASA 81 mg daily  - Continue Eliquis 5mg BID  - Fall and aspiration precautions  - Comprehensive Multidisciplinary Rehab Program: Gait, ADL, Functional impairments PT/OT/ SLP 3 hours a day 5 days a week    #Left facial twitching suspect for partial seizures 11/1  - Ativan 1 mg IV push x1   - Keppra 1 g IV load and continue 500 mg BID orally - reducing to 250 mg BID on 11/8  - CT head urgent - stable   - EEG urgent -stable   - Prolactin, lactate -WNL   - Per family this started about 10 years prior after a first "stroke" [place], for which he did not follow up with neurology. 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 In consideration of the MRI picture (significant cortical involvement) will taper Keppra to 250 mg BID     #PAF  #Noted bradycardia on 11/1 now improved   - Amiodarone 100mg daily as per cardiology -to continue to monitor HR   - Resume Metoprolol 12.5 mg BID - 11/11 - HR improved   - ELIQUIS 5mg BID  - EKG stable 11/11  -Cardiology following      #HTN/HLD  - home metoprolol 25 mg BID stopped due to bradycardia -resumed at 12.5mg BID 11/11 - HR improved   - Continue Crestor 20mg HS  - Monitor BP     # Lung Adenocarcinoma s/p VATS lobectomy on 10/8  # Small loculated L pleural effusion post-op  - Follow up with [facility]/onc already established  - No treatment while in patient    #Pancreatic cyst, incidental finding on CT CAP  - Follow up with PCP for Pancreatic protocol imaging every 2 years for 10 years.     #Mood / Cognition:  - Neuropsych evaluation     #Sleep/mood  - Maintain quiet hours and low stim environment  - Melatonin 9mg at bedtime 11/8  -Continue Remeron 7.5mg daily 11/8 -to assist with mood/sleep/appetite     #Pain:  - Tylenol PRN  - avoid sedating meds that may affect cognitive recovery    #GI/Bowel:  - Senna 2 tabs daily  - Miralax prn  - GI ppx: None    #/Bladder:  - Monitor PVR if no void in 8h; SC for >400 cc  - Toileting schedule q4h    #Diet / Dysphagia:    - Diet: Minced and Moist, thin liquids  - ongoing SLP assessment  - Nutrition to follow    #Skin/ Pressure Injury Prevention:  - assessment on admission: Mid abd scar, L upper flank incision sites x 4 , dry skin generalized, R foot lateral callus   - Turn Q2hrs in bed while awake, OOB to Chair, PT/OT/SLP    #DVT prophylaxis:  -Eliquis 5mg BID  - TEDs    #Precautions/ Restrictions  - Falls,   - Lungs: Aspiration, Incentive Spirometer      Outpatient Follow up:    Vitaly Dias MD  Internal medicine  [address] [place] [address] [phone]    Keke Lofton MD  [facility] Neurology  [address] [place] [address] [phone]

## 2024-11-12 NOTE — PROGRESS NOTE ADULT - SUBJECTIVE AND OBJECTIVE BOX
SUBJECTIVE/ROS: Patient seen in the room. He states that he slept well. His HR has been improved with addition of metoprolol. He is participating well in therapy and denies chest pain, nausea, vomiting, abdominal pain, headache, or BLE pain.      HPI:  73 year old right handed Fuzhou-speaking man with HTN, atrial fibrillation on Eliquis and Amiodarone, on Brilinta, former smoker x 34 years (quit 22months ago) discharged from  [facility] on 10/16 for left pulmonary nodule s/p Left lung lobectomy s/p chest tube presented to [facility] ED (10/17/24) with left sided weakness and left gaze preference. Daughter states patient last known normal at 11 am the last time she saw him well before she left to go to the store. She returned home at 1 pm and noted patient with left sided weakness and called EMS. Daughter confirmed she gave patient his Eliquis this morning. At baseline patient walks without assistive devices. On initial stroke evaluation, NIHSS-18 for patient awake with minimal verbal output right gaze preference not able to cross midline, left facial droop and LUE 0/5. LLE 1/5. CT with no ICH but noted dense right MCA. CTA head/neck with right ICA occlusion after the bifurcation extending to the supraclinoid ICA and right MCA, noted E/ICAD including L-ICA cavernous/supraclinoid segment mutlifocal stenosis, left VA mild to moderate stenosis. Patient not a candidate for TNK as on Eliquis and compliant. Patient underwent thrombectomy TICI 3. Patient admitted to MICU for close observation, required nicardipene drip. Repeat CTH with evolving Right MCA stroke without hemorrhage. Started ASA. Patient downgraded to Stroke Unit (10/19). A 1 c 6.1 (pre-OM), LDL 43 (wnl), TG 69 (wnl). TTE with mildly enlarged left atrium, mild concentric LVH, LV hyperdynamic EF >70%. [facility] CT surgery RAGINI Chacko from Dr. Mark Velez office [phone] reported that patient was on Eliquis and Brilinta prior to admission, admitted to [facility] 10/8 for left lower wedge x2 / lobectomy which required chest tube placement and was removed 10/16 and was told to hold blood thinners 3-7 days prior to admission. On discharge from [facility], EP was consulted as patient was in afib and recommend Amio load and resume Eliquis/Brilinta 10/16. Pathology resulted with adenocarcinoma but pending LN results for staging. No other HemOnc testing has been done. MRI brain with R-MCA territory infarct. Etiology of stroke cardio embolic due to Afib off AC due to recent surgery (10/2-10/16) vs hypercoagulability due to malignancy.      Vital Signs Last 24 Hrs  T(C): 36.8 (12 Nov 2024 08:50), Max: 36.9 (11 Nov 2024 21:24)  T(F): 98.2 (12 Nov 2024 08:50), Max: 98.5 (11 Nov 2024 21:24)  HR: 54 (12 Nov 2024 08:50) (54 - 87)  BP: 129/75 (12 Nov 2024 08:50) (129/75 - 145/84)  BP(mean): --  RR: 16 (12 Nov 2024 08:50) (16 - 16)  SpO2: 97% (12 Nov 2024 08:50) (97% - 98%)    Parameters below as of 12 Nov 2024 08:50  Patient On (Oxygen Delivery Method): room air        PHYSICAL EXAM  Constitutional - NAD, Comfortable in bed   HEENT - NCAT, EOMI  Neck - Supple, No limited ROM  Chest - Breathing comfortably in room air   Cardiovascular - RR - bradycardia improving   Extremities -  No calf tenderness   Neurologic Exam - patient alert, partially oriented to place (hospital in [place]), intermittently oriented to time. Left facial twitching. Language normal. Mild/moderate dysarthria. L facial droop. Moves all limbs against gravity. RUE 3/5 proximally, 1/5 distally, LUE 4/5 proximally, 5/5 distally. Left tactile inattention.         LABS:                          14.9   4.97  )-----------( 130      ( 11 Nov 2024 07:28 )             44.5     11-11    140  |  105  |  30[H]  ----------------------------<  102[H]  4.1   |  33[H]  |  1.23    Ca    10.2      11 Nov 2024 07:28  Mg     1.9     11-11    TPro  7.1  /  Alb  3.3  /  TBili  0.6  /  DBili  x   /  AST  65[H]  /  ALT  126[H]  /  AlkPhos  91  11-11    LIVER FUNCTIONS - ( 11 Nov 2024 07:28 )  Alb: 3.3 g/dL / Pro: 7.1 g/dL / ALK PHOS: 91 U/L / ALT: 126 U/L / AST: 65 U/L / GGT: x                   MEDICATIONS  (STANDING):  aMIOdarone    Tablet 100 milliGRAM(s) Oral daily  apixaban 5 milliGRAM(s) Oral two times a day  aspirin  chewable 81 milliGRAM(s) Oral daily  levETIRAcetam 250 milliGRAM(s) Oral two times a day  melatonin 9 milliGRAM(s) Oral at bedtime  metoprolol tartrate 12.5 milliGRAM(s) Oral two times a day  mirtazapine 7.5 milliGRAM(s) Oral at bedtime  pantoprazole    Tablet 40 milliGRAM(s) Oral before breakfast  rosuvastatin 20 milliGRAM(s) Oral at bedtime  senna 2 Tablet(s) Oral at bedtime    MEDICATIONS  (PRN):  acetaminophen     Tablet .. 650 milliGRAM(s) Oral every 6 hours PRN Temp greater or equal to 38C (100.4F), Mild Pain (1 - 3)  polyethylene glycol 3350 17 Gram(s) Oral at bedtime PRN Constipation   SUBJECTIVE/ROS: Patient seen in the room. He states that he slept well. His HR has been improved with addition of metoprolol. He is participating well in therapy and denies chest pain, nausea, vomiting, abdominal pain, headache, or BLE pain.      HPI:  73 year old right handed Fuz[place]-speaking man with HTN, atrial fibrillation on Eliquis and Amiodarone, on Brilinta, former smoker x 34 years (quit 22months ago) discharged from  [facility] on 10/16 for left pulmonary nodule s/p Left lung lobectomy s/p chest tube presented to [facility] ED (10/17/24) with left sided weakness and left gaze preference. Daughter states patient last known normal at 11 am the last time she saw him well before she left to go to the store. She returned home at 1 pm and noted patient with left sided weakness and called EMS. Daughter confirmed she gave patient his Eliquis this morning. At baseline patient walks without assistive devices. On initial stroke evaluation, NIHSS-18 for patient awake with minimal verbal output right gaze preference not able to cross midline, left facial droop and LUE 0/5. LLE 1/5. CT with no ICH but noted dense right MCA. CTA head/neck with right ICA occlusion after the bifurcation extending to the supraclinoid ICA and right MCA, noted E/ICAD including L-ICA cavernous/supraclinoid segment mutlifocal stenosis, left VA mild to moderate stenosis. Patient not a candidate for TNK as on Eliquis and compliant. Patient underwent thrombectomy TICI 3. Patient admitted to MICU for close observation, required nicardipene drip. Repeat CTH with evolving Right MCA stroke without hemorrhage. Started ASA. Patient downgraded to Stroke Unit (10/19). A 1 c 6.1 (pre-OM), LDL 43 (wnl), TG 69 (wnl). TTE with mildly enlarged left atrium, mild concentric LVH, LV hyperdynamic EF >70%. [facility] CT surgery RAGINI Chacko from Dr. Mark Velez office [phone] reported that patient was on Eliquis and Brilinta prior to admission, admitted to [facility] 10/8 for left lower wedge x2 / lobectomy which required chest tube placement and was removed 10/16 and was told to hold blood thinners 3-7 days prior to admission. On discharge from [facility], EP was consulted as patient was in afib and recommend Amio load and resume Eliquis/Brilinta 10/16. Pathology resulted with adenocarcinoma but pending LN results for staging. No other HemOnc testing has been done. MRI brain with R-MCA territory infarct. Etiology of stroke cardio embolic due to Afib off AC due to recent surgery (10/2-10/16) vs hypercoagulability due to malignancy.      Vital Signs Last 24 Hrs  T(C): 36.8 (12 Nov 2024 08:50), Max: 36.9 (11 Nov 2024 21:24)  T(F): 98.2 (12 Nov 2024 08:50), Max: 98.5 (11 Nov 2024 21:24)  HR: 54 (12 Nov 2024 08:50) (54 - 87)  BP: 129/75 (12 Nov 2024 08:50) (129/75 - 145/84)  RR: 16 (12 Nov 2024 08:50) (16 - 16)  SpO2: 97% (12 Nov 2024 08:50) (97% - 98%)    Parameters below as of 12 Nov 2024 08:50  Patient On (Oxygen Delivery Method): room air        PHYSICAL EXAM  Constitutional - NAD, Comfortable in bed   HEENT - NCAT, EOMI  Neck - Supple, No limited ROM  Chest - Breathing comfortably in room air   Cardiovascular - RR - bradycardia improving   Extremities -  No calf tenderness   Neurologic Exam - patient alert, partially oriented to place (hospital in [place]), intermittently oriented to time. Left facial twitching. Language normal. Mild/moderate dysarthria. L facial droop. Moves all limbs against gravity. RUE 3/5 proximally, 1/5 distally, LUE 4/5 proximally, 5/5 distally. Left tactile inattention.         LABS:                          14.9   4.97  )-----------( 130      ( 11 Nov 2024 07:28 )             44.5     11-11    140  |  105  |  30[H]  ----------------------------<  102[H]  4.1   |  33[H]  |  1.23    Ca    10.2      11 Nov 2024 07:28  Mg     1.9     11-11    TPro  7.1  /  Alb  3.3  /  TBili  0.6  /  DBili  x   /  AST  65[H]  /  ALT  126[H]  /  AlkPhos  91  11-11    LIVER FUNCTIONS - ( 11 Nov 2024 07:28 )  Alb: 3.3 g/dL / Pro: 7.1 g/dL / ALK PHOS: 91 U/L / ALT: 126 U/L / AST: 65 U/L / GGT: x           MEDICATIONS  (STANDING):  aMIOdarone    Tablet 100 milliGRAM(s) Oral daily  apixaban 5 milliGRAM(s) Oral two times a day  aspirin  chewable 81 milliGRAM(s) Oral daily  levETIRAcetam 250 milliGRAM(s) Oral two times a day  melatonin 9 milliGRAM(s) Oral at bedtime  metoprolol tartrate 12.5 milliGRAM(s) Oral two times a day  mirtazapine 7.5 milliGRAM(s) Oral at bedtime  pantoprazole    Tablet 40 milliGRAM(s) Oral before breakfast  rosuvastatin 20 milliGRAM(s) Oral at bedtime  senna 2 Tablet(s) Oral at bedtime    MEDICATIONS  (PRN):  acetaminophen     Tablet .. 650 milliGRAM(s) Oral every 6 hours PRN Temp greater or equal to 38C (100.4F), Mild Pain (1 - 3)  polyethylene glycol 3350 17 Gram(s) Oral at bedtime PRN Constipation    Function: IDT 11/11  Ambulating 250ft with no device CG 12 stairs with 2 hand rails min assist  Est dc 11/19 with 24hr supervision

## 2024-11-12 NOTE — PROGRESS NOTE ADULT - ATTENDING COMMENTS
Continues to improve clinically and functionally
Seen and examined, note revised  No acute med complaint     Reports no acute med complaint   Slept well   Engaging in therapy, ambulating increasing distance but continues to require supervision    Facial deviation to right  Mildly flat nasolabial fold    Continue therapy   DVT ppx and all supportive care
RTT called for suspected partial seizures   Work up is ordered - Prolactin level, Head CT, EEG  Morning labs reviewed   IV Ativan stat   Keppra - loading dose and maintenance dose ordered  Neurological exam otherwise stable    Family notified and update given - ? history of left facial twitching for years - ? seizure vs hemifacial spasm.   Close monitoring in progress

## 2024-11-13 PROCEDURE — 99232 SBSQ HOSP IP/OBS MODERATE 35: CPT

## 2024-11-13 RX ORDER — MODAFINIL 200 MG/1
50 TABLET ORAL
Refills: 0 | Status: DISCONTINUED | OUTPATIENT
Start: 2024-11-13 | End: 2024-11-18

## 2024-11-13 RX ADMIN — METOPROLOL TARTRATE 12.5 MILLIGRAM(S): 100 TABLET, FILM COATED ORAL at 05:10

## 2024-11-13 RX ADMIN — APIXABAN 5 MILLIGRAM(S): 2.5 TABLET, FILM COATED ORAL at 05:10

## 2024-11-13 RX ADMIN — MIRTAZAPINE 7.5 MILLIGRAM(S): 15 TABLET, FILM COATED ORAL at 20:08

## 2024-11-13 RX ADMIN — AMIODARONE HYDROCHLORIDE 100 MILLIGRAM(S): 200 TABLET ORAL at 05:11

## 2024-11-13 RX ADMIN — ROSUVASTATIN CALCIUM 20 MILLIGRAM(S): 5 TABLET, FILM COATED ORAL at 20:08

## 2024-11-13 RX ADMIN — PANTOPRAZOLE SODIUM 40 MILLIGRAM(S): 40 TABLET, DELAYED RELEASE ORAL at 05:10

## 2024-11-13 RX ADMIN — Medication 81 MILLIGRAM(S): at 11:15

## 2024-11-13 RX ADMIN — METOPROLOL TARTRATE 12.5 MILLIGRAM(S): 100 TABLET, FILM COATED ORAL at 17:40

## 2024-11-13 RX ADMIN — MODAFINIL 50 MILLIGRAM(S): 200 TABLET ORAL at 13:21

## 2024-11-13 RX ADMIN — LEVETIRACETAM 250 MILLIGRAM(S): 1000 TABLET ORAL at 17:40

## 2024-11-13 RX ADMIN — ACETAMINOPHEN, DIPHENHYDRAMINE HCL, PHENYLEPHRINE HCL 9 MILLIGRAM(S): 325; 25; 5 TABLET ORAL at 20:07

## 2024-11-13 RX ADMIN — LEVETIRACETAM 250 MILLIGRAM(S): 1000 TABLET ORAL at 05:10

## 2024-11-13 RX ADMIN — Medication 2 TABLET(S): at 20:08

## 2024-11-13 RX ADMIN — APIXABAN 5 MILLIGRAM(S): 2.5 TABLET, FILM COATED ORAL at 17:40

## 2024-11-13 NOTE — PROGRESS NOTE ADULT - SUBJECTIVE AND OBJECTIVE BOX
Patient is a 73y old  Male who presents with a chief complaint of R-MCA CVA (10 Nov 2024 10:04)      SUBJECTIVE / OVERNIGHT EVENTS:  Patient seen and examined  Chart reviewed  No overnight acute events    MEDICATIONS  (STANDING):  aMIOdarone    Tablet 100 milliGRAM(s) Oral daily  apixaban 5 milliGRAM(s) Oral two times a day  aspirin  chewable 81 milliGRAM(s) Oral daily  levETIRAcetam 250 milliGRAM(s) Oral two times a day  melatonin 9 milliGRAM(s) Oral at bedtime  metoprolol tartrate 12.5 milliGRAM(s) Oral two times a day  mirtazapine 7.5 milliGRAM(s) Oral at bedtime  pantoprazole    Tablet 40 milliGRAM(s) Oral before breakfast  rosuvastatin 20 milliGRAM(s) Oral at bedtime  senna 2 Tablet(s) Oral at bedtime    MEDICATIONS  (PRN):  acetaminophen     Tablet .. 650 milliGRAM(s) Oral every 6 hours PRN Temp greater or equal to 38C (100.4F), Mild Pain (1 - 3)  polyethylene glycol 3350 17 Gram(s) Oral at bedtime PRN Constipation    T(C): 36.5 (11-13-24 @ 07:40), Max: 36.7 (11-12-24 @ 20:06)  T(F): 97.7 (11-13-24 @ 07:40), Max: 98.1 (11-12-24 @ 20:06)  HR: 53 (11-13-24 @ 07:40) (53 - 78)  BP: 126/80 (11-13-24 @ 07:40) (121/77 - 131/79)  ABP: --  ABP(mean): --  RR: 16 (11-13-24 @ 07:40) (16 - 16)  SpO2: 99% (11-13-24 @ 07:40) (95% - 99%)    GENERAL: Not in distress. Alert    HEENT: clear conjuctiva, MMM. no pallor or icterus  CARDIOVASCULAR: Irregular, S1, S2. No murmur/rubs/gallop  LUNGS: BLAE+, no rales, no wheezing, no rhonchi.    ABDOMEN: ND. Soft,  NT, no guarding / rebound / rigidity. BS normoactive  EXTREMITIES: no edema. no leg or calf TP.  SKIN: warm and dry  PSYCHIATRIC: Calm.  No agitation.  CNS: follows simple commands, left upper extremity weakness +    LABS:                        14.9   4.97  )-----------( 130      ( 11 Nov 2024 07:28 )             44.5     11-11    140  |  105  |  30[H]  ----------------------------<  102[H]  4.1   |  33[H]  |  1.23    Ca    10.2      11 Nov 2024 07:28    TPro  7.1  /  Alb  3.3  /  TBili  0.6  /  DBili  x   /  AST  65[H]  /  ALT  126[H]  /  AlkPhos  91  11-11          Urinalysis Basic - ( 11 Nov 2024 07:28 )    Color: x / Appearance: x / SG: x / pH: x  Gluc: 102 mg/dL / Ketone: x  / Bili: x / Urobili: x   Blood: x / Protein: x / Nitrite: x   Leuk Esterase: x / RBC: x / WBC x   Sq Epi: x / Non Sq Epi: x / Bacteria: x        COVID-19 PCR: NotDetec (31 Oct 2024 17:56)      RADIOLOGY & ADDITIONAL TESTS:  Imaging from Last 24 Hours:    Electrocardiogram/QTc Interval:    COORDINATION OF CARE:  Care Discussed with Consultants/Other Providers:

## 2024-11-13 NOTE — PROGRESS NOTE ADULT - ASSESSMENT
73 year old male with PMH of HTN, atrial fibrillation (on eliquis), former smoker x 34 years presented to [facility] on 10/17 with right ICA occlusion and right MCA infarct s/p thrombectomy TICI 3. Discharged from [facility] day before CVA for left pulmonary nodule s/p Left lung lobectomy s/p chest tube. Had been off blood thinners (10/2-10/16)  prior to surgery, then noted to by in AFIB on day of [facility] discharge so was loaded with amiodarone and restarted on Eliquis and Brilinta. Etiology of stroke cardio embolic due to Afib off AC due to recent surgery (10/2-10/16) vs hypercoagulability due to malignancy. Lung path showing adenocarcinoma, no hemo/onc workup started.  Admitted for multidisciplinary rehab program on 10/31.    #R MCA infarct s/p thrombectomy TICI 3  -Right ICA occlusion after the bifurcation extending to the supraclinoid ICA and right MCA, noted E/ICAD including L-ICA cavernous/supraclinoid segment mutlifocal stenosis, left VA mild to moderate stenosis.   -s/p thrombectomy 10/17  -Etiology of stroke cardio embolic due to Afib off AC due to recent surgery (10/2-10/16) vs hypercoagulability due to malignancy  -CTH 10/31 stable, no hemorrhage  -Continue seizure PPx: Keppra   -Continue Crestor   -Continue ASA 81 mg daily  -Continue Eliquis    #PAF  #Asymptomatic Sinus Bradycardia  -EKG 11/3 showed sinus bradycardia  - continue metoprolol with holding parameters.  - Amiodarone was reduced from 200 mg daily to 100 mg daily per cardio. on 11/7 for bradycardia  - Continue Eliquis 5mg BID  - Patient will need yearly checks of LFTs, thyroid, EKG and lungs/PFTs for surveillance to assess for amiodarone toxicity. TSH now normal  - Keep K >4 and Mg>2  - cardio cx noted and appreciated     #HTN  - resumed Metoprolol Tart 12.5 mg BID. titrate up as able  - DASh  - monitor VS including OH periodically    #HLD  -Continue Crestor     #Lung Adenocarcinoma s/p VATS lobectomy on 10/8  #Small loculated L pleural effusion post-op  -Follow up with [facility] heme/onc already established  -No treatment while in patient    #Pancreatic cyst, incidental finding on CT CAP  -Follow up with PCP for Pancreatic protocol imaging every 2 years for 10 years    #Transaminitis (Stable)  -Monitor CMP, c/w Crestor for now   #GI ppx - PPI    #DVT prophylaxis  -Eliquis 5mg BID    d/w Rehab team at [facility]

## 2024-11-13 NOTE — PROGRESS NOTE ADULT - ASSESSMENT
74 yo right handed zhou-speaking M with PMHX HTN, atrial fibrillation, former smoker x 34 years presented to [facility] on 10/17 with right ICA occlusion and right MCA infarct s/p thrombectomy TICI 3. Discharged from [facility] day before CVA for left pulmonary nodule s/p Left lung lobectomy s/p chest tube. Had been off blood thinners (10/2-10/16)  prior to surgery, then noted to by in AFIB on day of [facility] discharge so was loaded with amiodarone and restarted on Eliquis and Brilinta. Etiology of stroke cardio embolic due to Afib off AC due to recent surgery (10/2-10/16) vs hypercoagulability due to malignancy. Lung path showing adenocarcinoma, no hemo/onc workup started.  Admitted for multidisciplinary rehab program on 10/31.    #R MCA infarct s/p  thrombectomy TICI 3  - right ICA occlusion after the bifurcation extending to the supraclinoid ICA and right MCA, noted E/ICAD including L-ICA cavernous/supraclinoid segment multifocal stenosis, left VA mild to moderate stenosis. s/p thrombectomy 10/17  - Etiology of stroke cardio embolic due to Afib off AC due to recent surgery (10/2-10/16) vs hypercoagulability due to malignancy  - CTH 10/31 Stable, no hemorrhage  - Continue Crestor 20 mg nightly   - Continue ASA 81 mg daily  - Continue Eliquis 5mg BID  - Fall and aspiration precautions  - Comprehensive Multidisciplinary Rehab Program: Gait, ADL, Functional impairments PT/OT/ SLP 3 hours a day 5 days a week    #Left facial twitching suspect for partial seizures 11/1  - Ativan 1 mg IV push x1   - Keppra 1 g IV load and continue 500 mg BID orally - reducing to 250 mg BID on 11/8  - CT head urgent - stable   - EEG urgent -stable   - Prolactin, lactate -WNL   - Per family this started about 10 years prior after [place] "[place] for which he did not follow up with neurology. 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 In consideration of the MRI picture (significant cortical involvement) will taper Keppra to 250 mg BID     #PAF  #Noted bradycardia on 11/1 now improved   - Amiodarone 100mg daily as per cardiology -to continue to monitor HR   - Resume Metoprolol 12.5 mg BID - 11/11 - HR improved   - ELIQUIS 5mg BID  - EKG stable 11/11  -Cardiology following      #HTN/HLD  - home metoprolol 25 mg BID stopped due to bradycardia -resumed at 12.5mg BID 11/11 - HR improved   - Continue Crestor 20mg HS  - Monitor BP     # Lung Adenocarcinoma s/p VATS lobectomy on 10/8  # Small loculated L pleural effusion post-op  - Follow up with [facility]/onc already established  - No treatment while in patient    #Pancreatic cyst, incidental finding on CT CAP  - Follow up with PCP for Pancreatic protocol imaging every 2 years for 10 years.     #Mood / Cognition:  - Neuropsych evaluation     #Sleep/mood  - Maintain quiet hours and low stim environment  - Melatonin 9mg at bedtime 11/8  -Continue Remeron 7.5mg daily 11/8 -to assist with mood/sleep/appetite   -Start modafinil 50mg at 6am and 1pm for wakefulness 11/13    #Pain:  - Tylenol PRN  - avoid sedating meds that may affect cognitive recovery    #GI/Bowel:  - Senna 2 tabs daily  - Miralax prn  - GI ppx: None    #/Bladder:  - Monitor PVR if no void in 8h; SC for >400 cc  - Toileting schedule q4h    #Diet / Dysphagia:    - Diet: Minced and Moist, thin liquids  - ongoing SLP assessment  - Nutrition to follow    #Skin/ Pressure Injury Prevention:  - assessment on admission: Mid abd scar, L upper flank incision sites x 4 , dry skin generalized, R foot lateral callus   - Turn Q2hrs in bed while awake, OOB to Chair, PT/OT/SLP    #DVT prophylaxis:  -Eliquis 5mg BID  - TEDs    #Precautions/ Restrictions  - Falls,   - Lungs: Aspiration, Incentive Spirometer      Outpatient Follow up:    Vitaly Dias MD  Internal medicine  [address] [place] [address] [phone]    Keke Lofton MD  [facility] Neurology  [address] [place] [address] [phone]       74 yo right handed zhou-speaking M with PMHX HTN, atrial fibrillation, former smoker x 34 years presented to [facility] on 10/17 with right ICA occlusion and right MCA infarct s/p thrombectomy TICI 3. Discharged from [facility] day before CVA for left pulmonary nodule s/p Left lung lobectomy s/p chest tube. Had been off blood thinners (10/2-10/16)  prior to surgery, then noted to by in AFIB on day of [facility] discharge so was loaded with amiodarone and restarted on Eliquis and Brilinta. Etiology of stroke cardio embolic due to Afib off AC due to recent surgery (10/2-10/16) vs hypercoagulability due to malignancy. Lung path showing adenocarcinoma, no hemo/onc workup started.  Admitted for multidisciplinary rehab program on 10/31.    *Persisting somnolence and ebulia --Start modafinil 50mg at 6am and 1pm for wakefulness 11/13  * Dysphagia--difficulty swallowing today--monitor, SLP swallow evaluation and consider MBS if needed     #R MCA infarct s/p  thrombectomy TICI 3  - right ICA occlusion after the bifurcation extending to the supraclinoid ICA and right MCA, noted E/ICAD including L-ICA cavernous/supraclinoid segment multifocal stenosis, left VA mild to moderate stenosis. s/p thrombectomy 10/17  - Etiology of stroke cardio embolic due to Afib off AC due to recent surgery (10/2-10/16) vs hypercoagulability due to malignancy  - CT 10/31 Stable, no hemorrhage  - Continue Crestor 20 mg nightly   - Continue ASA 81 mg daily  - Continue Eliquis 5mg BID  - Fall and aspiration precautions  - Comprehensive Multidisciplinary Rehab Program: Gait, ADL, Functional impairments PT/OT/ SLP 3 hours a day 5 days a week    #Left facial twitching suspect for partial seizures 11/1  - Ativan 1 mg IV push x1   - Keppra 1 g IV load and continue 500 mg BID orally - reducing to 250 mg BID on 11/8  - CT head urgent - stable   - EEG urgent -stable   - Prolactin, lactate -WNL   - Per family this started about 10 years prior after a first "[place]" [place], for which he did not follow up with neurology. 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 In consideration of the MRI picture (significant cortical involvement) will taper Keppra to 250 mg BID     #PAF  #Noted bradycardia on 11/1 now improved   - Amiodarone 100mg daily as per cardiology -to continue to monitor HR   - Resume Metoprolol 12.5 mg BID - 11/11 - HR improved   - ELIQUIS 5mg BID  - EKG stable 11/11  -Cardiology following      #HTN/HLD  - home metoprolol 25 mg BID stopped due to bradycardia -resumed at 12.5mg BID 11/11 - HR improved   - Continue Crestor 20mg HS  - Monitor BP     # Lung Adenocarcinoma s/p VATS lobectomy on 10/8  # Small loculated L pleural effusion post-op  - Follow up with [facility]/onc already established  - No treatment while in patient    #Pancreatic cyst, incidental finding on CT CAP  - Follow up with PCP for Pancreatic protocol imaging every 2 years for 10 years.     #Mood / Cognition:  - Neuropsych evaluation     #Sleep/mood  - Maintain quiet hours and low stim environment  - Melatonin 9mg at bedtime 11/8  -Continue Remeron 7.5mg daily 11/8 -to assist with mood/sleep/appetite   -Start modafinil 50mg at 6am and 1pm for wakefulness 11/13    #Pain:  - Tylenol PRN  - avoid sedating meds that may affect cognitive recovery    #GI/Bowel:  - Senna 2 tabs daily  - Miralax prn  - GI ppx: None    #/Bladder:  - Monitor PVR if no void in 8h; SC for >400 cc  - Toileting schedule q4h    #Diet / Dysphagia:    - Diet: Minced and Moist, thin liquids  - ongoing SLP assessment  - Nutrition to follow    #Skin/ Pressure Injury Prevention:   Mid abd scar, L upper flank incision sites x 4 , dry skin generalized, R foot lateral callus   - Turn Q2hrs in bed while awake, OOB to Chair, PT/OT/SLP    #DVT prophylaxis:  -Eliquis 5mg BID  - TEDs    #Precautions/ Restrictions  - Falls,   - Lungs: Aspiration, Incentive Spirometer      Outpatient Follow up:    Vitaly Dias MD  Internal medicine  [address] [place] [address] [phone]    Keke Lofton MD  [facility] Neurology  [address] [place] [id number] [phone]

## 2024-11-14 ENCOUNTER — TRANSCRIPTION ENCOUNTER (OUTPATIENT)
Age: 74
End: 2024-11-14

## 2024-11-14 LAB
ALBUMIN SERPL ELPH-MCNC: 3.1 G/DL — LOW (ref 3.3–5)
ALP SERPL-CCNC: 86 U/L — SIGNIFICANT CHANGE UP (ref 40–120)
ALT FLD-CCNC: 97 U/L — HIGH (ref 10–45)
ANION GAP SERPL CALC-SCNC: 4 MMOL/L — LOW (ref 5–17)
AST SERPL-CCNC: 47 U/L — HIGH (ref 10–40)
BASOPHILS # BLD AUTO: 0.03 K/UL — SIGNIFICANT CHANGE UP (ref 0–0.2)
BASOPHILS NFR BLD AUTO: 0.7 % — SIGNIFICANT CHANGE UP (ref 0–2)
BILIRUB SERPL-MCNC: 0.6 MG/DL — SIGNIFICANT CHANGE UP (ref 0.2–1.2)
BUN SERPL-MCNC: 36 MG/DL — HIGH (ref 7–23)
CALCIUM SERPL-MCNC: 9.9 MG/DL — SIGNIFICANT CHANGE UP (ref 8.4–10.5)
CHLORIDE SERPL-SCNC: 109 MMOL/L — HIGH (ref 96–108)
CO2 SERPL-SCNC: 31 MMOL/L — SIGNIFICANT CHANGE UP (ref 22–31)
CREAT SERPL-MCNC: 1.27 MG/DL — SIGNIFICANT CHANGE UP (ref 0.5–1.3)
EGFR: 60 ML/MIN/1.73M2 — SIGNIFICANT CHANGE UP
EOSINOPHIL # BLD AUTO: 0.15 K/UL — SIGNIFICANT CHANGE UP (ref 0–0.5)
EOSINOPHIL NFR BLD AUTO: 3.4 % — SIGNIFICANT CHANGE UP (ref 0–6)
GLUCOSE SERPL-MCNC: 109 MG/DL — HIGH (ref 70–99)
HCT VFR BLD CALC: 41.1 % — SIGNIFICANT CHANGE UP (ref 39–50)
HGB BLD-MCNC: 13.7 G/DL — SIGNIFICANT CHANGE UP (ref 13–17)
IMM GRANULOCYTES NFR BLD AUTO: 0.2 % — SIGNIFICANT CHANGE UP (ref 0–0.9)
LYMPHOCYTES # BLD AUTO: 1.06 K/UL — SIGNIFICANT CHANGE UP (ref 1–3.3)
LYMPHOCYTES # BLD AUTO: 23.8 % — SIGNIFICANT CHANGE UP (ref 13–44)
MAGNESIUM SERPL-MCNC: 2 MG/DL — SIGNIFICANT CHANGE UP (ref 1.6–2.6)
MCHC RBC-ENTMCNC: 30.6 PG — SIGNIFICANT CHANGE UP (ref 27–34)
MCHC RBC-ENTMCNC: 33.3 G/DL — SIGNIFICANT CHANGE UP (ref 32–36)
MCV RBC AUTO: 91.7 FL — SIGNIFICANT CHANGE UP (ref 80–100)
MONOCYTES # BLD AUTO: 0.62 K/UL — SIGNIFICANT CHANGE UP (ref 0–0.9)
MONOCYTES NFR BLD AUTO: 13.9 % — SIGNIFICANT CHANGE UP (ref 2–14)
NEUTROPHILS # BLD AUTO: 2.59 K/UL — SIGNIFICANT CHANGE UP (ref 1.8–7.4)
NEUTROPHILS NFR BLD AUTO: 58 % — SIGNIFICANT CHANGE UP (ref 43–77)
NRBC # BLD: 0 /100 WBCS — SIGNIFICANT CHANGE UP (ref 0–0)
PLATELET # BLD AUTO: 114 K/UL — LOW (ref 150–400)
POTASSIUM SERPL-MCNC: 3.9 MMOL/L — SIGNIFICANT CHANGE UP (ref 3.5–5.3)
POTASSIUM SERPL-SCNC: 3.9 MMOL/L — SIGNIFICANT CHANGE UP (ref 3.5–5.3)
PROT SERPL-MCNC: 6.5 G/DL — SIGNIFICANT CHANGE UP (ref 6–8.3)
RBC # BLD: 4.48 M/UL — SIGNIFICANT CHANGE UP (ref 4.2–5.8)
RBC # FLD: 13.9 % — SIGNIFICANT CHANGE UP (ref 10.3–14.5)
SODIUM SERPL-SCNC: 144 MMOL/L — SIGNIFICANT CHANGE UP (ref 135–145)
WBC # BLD: 4.46 K/UL — SIGNIFICANT CHANGE UP (ref 3.8–10.5)
WBC # FLD AUTO: 4.46 K/UL — SIGNIFICANT CHANGE UP (ref 3.8–10.5)

## 2024-11-14 PROCEDURE — 74230 X-RAY XM SWLNG FUNCJ C+: CPT | Mod: 26

## 2024-11-14 PROCEDURE — 99232 SBSQ HOSP IP/OBS MODERATE 35: CPT

## 2024-11-14 RX ADMIN — MIRTAZAPINE 7.5 MILLIGRAM(S): 15 TABLET, FILM COATED ORAL at 20:10

## 2024-11-14 RX ADMIN — APIXABAN 5 MILLIGRAM(S): 2.5 TABLET, FILM COATED ORAL at 18:26

## 2024-11-14 RX ADMIN — METOPROLOL TARTRATE 12.5 MILLIGRAM(S): 100 TABLET, FILM COATED ORAL at 18:26

## 2024-11-14 RX ADMIN — AMIODARONE HYDROCHLORIDE 100 MILLIGRAM(S): 200 TABLET ORAL at 05:05

## 2024-11-14 RX ADMIN — ROSUVASTATIN CALCIUM 20 MILLIGRAM(S): 5 TABLET, FILM COATED ORAL at 20:09

## 2024-11-14 RX ADMIN — PANTOPRAZOLE SODIUM 40 MILLIGRAM(S): 40 TABLET, DELAYED RELEASE ORAL at 05:04

## 2024-11-14 RX ADMIN — Medication 2 TABLET(S): at 20:09

## 2024-11-14 RX ADMIN — Medication 81 MILLIGRAM(S): at 12:12

## 2024-11-14 RX ADMIN — LEVETIRACETAM 250 MILLIGRAM(S): 1000 TABLET ORAL at 05:05

## 2024-11-14 RX ADMIN — MODAFINIL 50 MILLIGRAM(S): 200 TABLET ORAL at 05:07

## 2024-11-14 RX ADMIN — MODAFINIL 50 MILLIGRAM(S): 200 TABLET ORAL at 12:12

## 2024-11-14 RX ADMIN — APIXABAN 5 MILLIGRAM(S): 2.5 TABLET, FILM COATED ORAL at 05:05

## 2024-11-14 RX ADMIN — METOPROLOL TARTRATE 12.5 MILLIGRAM(S): 100 TABLET, FILM COATED ORAL at 05:05

## 2024-11-14 RX ADMIN — ACETAMINOPHEN, DIPHENHYDRAMINE HCL, PHENYLEPHRINE HCL 9 MILLIGRAM(S): 325; 25; 5 TABLET ORAL at 20:11

## 2024-11-14 NOTE — PROGRESS NOTE ADULT - ASSESSMENT
73 year old male with PMH of HTN, atrial fibrillation (on eliquis), former smoker x 34 years presented to [facility] on 10/17 with right ICA occlusion and right MCA infarct s/p thrombectomy TICI 3. Discharged from [facility] day before CVA for left pulmonary nodule s/p Left lung lobectomy s/p chest tube. Had been off blood thinners (10/2-10/16)  prior to surgery, then noted to by in AFIB on day of [facility] discharge so was loaded with amiodarone and restarted on Eliquis and Brilinta. Etiology of stroke cardio embolic due to Afib off AC due to recent surgery (10/2-10/16) vs hypercoagulability due to malignancy. Lung path showing adenocarcinoma, no hemo/onc workup started.  Admitted for multidisciplinary rehab program on 10/31.    #R MCA infarct s/p thrombectomy TICI 3  -Right ICA occlusion after the bifurcation extending to the supraclinoid ICA and right MCA, noted E/ICAD including L-ICA cavernous/supraclinoid segment mutlifocal stenosis, left VA mild to moderate stenosis.   -s/p thrombectomy 10/17  -Etiology of stroke cardio embolic due to Afib off AC due to recent surgery (10/2-10/16) vs hypercoagulability due to malignancy  -CTH 10/31 stable, no hemorrhage  -Continue seizure PPx: Keppra   -Continue Crestor   -Continue ASA 81 mg daily  -Continue Eliquis    #PAF  #Asymptomatic Sinus Bradycardia  -EKG 11/3 showed sinus bradycardia  - continue metoprolol with holding parameters.  - Amiodarone was reduced from 200 mg daily to 100 mg daily per cardio. on 11/7 for bradycardia  - Continue Eliquis 5mg BID  - Patient will need yearly checks of LFTs, thyroid, EKG and lungs/PFTs for surveillance to assess for amiodarone toxicity. TSH now normal  - Keep K >4 and Mg>2  - cardio cx noted and appreciated     #HTN  - resumed Metoprolol Tart 12.5 mg BID. titrate up as able  - DASh  - monitor VS including OH periodically    #HLD  -Continue Crestor     #Lung Adenocarcinoma s/p VATS lobectomy on 10/8  #Small loculated L pleural effusion post-op  -Follow up with [facility] heme/onc already established  -No treatment while in patient    #Pancreatic cyst, incidental finding on CT CAP  -Follow up with PCP for Pancreatic protocol imaging every 2 years for 10 years    #Transaminitis (Stable)  -Monitor CMP, c/w Crestor for now   #GI ppx - PPI    #DVT prophylaxis  -Eliquis 5mg BID    d/w Rehab team at [facility]

## 2024-11-14 NOTE — DISCHARGE NOTE PROVIDER - CARE PROVIDERS DIRECT ADDRESSES
[email or web address],DirectAddress_Unknown,DirectAddress_Unknown,DirectAddress_Unknown

## 2024-11-14 NOTE — DISCHARGE NOTE PROVIDER - NSDCFUSCHEDAPPT_GEN_ALL_CORE_FT
[facility]  [facility] [address]  Scheduled Appointment: 11/21/2024    Can Acosta  [facility] [phone]  Scheduled Appointment: 11/27/2024     [facility] [address]  Scheduled Appointment: 11/21/2024    Can Acosta  [facility] [phone]  Scheduled Appointment: 01/08/2025

## 2024-11-14 NOTE — CHART NOTE - NSCHARTNOTEFT_GEN_A_CORE
NUTRITION Follow Up Note    Pt with continued poor po intake. Pt consuming <50% of meals provided however with fair intake of rice and steamed broccoli provided. Fair adherence to ONS to optimize protein and calorie intake. No issues with N/V/D/C reported. No chewing/swallowing issues at this time.     SOURCE: Patient [X]  Medical Record [X]  Nursing Staff [X]  Family Member []    DIET: Diet, Minced and Moist:   Supplement Feeding Modality:  Oral  Ensure Plus High Protein Cans or Servings Per Day:  1       Frequency:  Three Times a day (11-13-24 @ 10:04) [Active]          EDEMA: None Noted     LAST BM:     SKIN: No Pressure Ulcers     WEIGHT TRENDS: 132.4lbs 10/31      PERTINENT MEDICATIONS: MEDICATIONS  (STANDING):  aMIOdarone    Tablet 100 milliGRAM(s) Oral daily  apixaban 5 milliGRAM(s) Oral two times a day  aspirin  chewable 81 milliGRAM(s) Oral daily  levETIRAcetam 250 milliGRAM(s) Oral two times a day  melatonin 9 milliGRAM(s) Oral at bedtime  metoprolol tartrate 12.5 milliGRAM(s) Oral two times a day  mirtazapine 7.5 milliGRAM(s) Oral at bedtime  modafinil 50 milliGRAM(s) Oral <User Schedule>  pantoprazole    Tablet 40 milliGRAM(s) Oral before breakfast  rosuvastatin 20 milliGRAM(s) Oral at bedtime  senna 2 Tablet(s) Oral at bedtime    MEDICATIONS  (PRN):  acetaminophen     Tablet .. 650 milliGRAM(s) Oral every 6 hours PRN Temp greater or equal to 38C (100.4F), Mild Pain (1 - 3)  polyethylene glycol 3350 17 Gram(s) Oral at bedtime PRN Constipation      PERTINENT LABS:  11-14 Na144 mmol/L Glu 109 mg/dL[H] K+ 3.9 mmol/L Cr  1.27 mg/dL BUN 36 mg/dL[H] 11-14 Alb 3.1 g/dL[L]        ESTIMATED NEEDS:   [X] No Change Since Previous Assessment    PREVIOUS NUTRITION DIAGNOSIS:  Malnutrition...  Moderate, acute  related to inadequate protein-energy intake s/p CVA, acute hospitalization, dislike of institutional foods  as evidenced by mild muscle wasting/fat loss, pt reports consuming <75% > 7 days      NUTRITION DIAGNOSIS IS [X] Ongoing     INTERVENTIONS:   1. Continue Current Nutrition Care Regimen at This Time.     MONITORING & EVALUATION:   1. Weights   2. PO intakes   3. Skin integrity   4. Tolerance to diet prescription   5. Labs & POCT  6. Follow up (per protocol)    Registered Dietitian/Nutritionist Remains Available.  Ramy Martinez RD    Contact: [phone] or via MS TEAMS NUTRITION Follow Up Note    Pt with improved po intake as per nursing. RD visited pt during lunch & pt consuming ~50% of meals. Fair adherence to ONS to optimize protein and calorie intake. No issues with N/V/D/C reported. No chewing/swallowing issues at this time.     SOURCE: Patient [X]  Medical Record [X]  Nursing Staff [X]  Family Member []    DIET: Diet, Minced and Moist:   Supplement Feeding Modality:  Oral  Ensure Plus High Protein Cans or Servings Per Day:  1       Frequency:  Three Times a day (11-13-24 @ 10:04) [Active]          EDEMA: None Noted     LAST BM:     SKIN: No Pressure Ulcers     WEIGHT TRENDS: 132.4lbs 10/31      PERTINENT MEDICATIONS: MEDICATIONS  (STANDING):  aMIOdarone    Tablet 100 milliGRAM(s) Oral daily  apixaban 5 milliGRAM(s) Oral two times a day  aspirin  chewable 81 milliGRAM(s) Oral daily  levETIRAcetam 250 milliGRAM(s) Oral two times a day  melatonin 9 milliGRAM(s) Oral at bedtime  metoprolol tartrate 12.5 milliGRAM(s) Oral two times a day  mirtazapine 7.5 milliGRAM(s) Oral at bedtime  modafinil 50 milliGRAM(s) Oral <User Schedule>  pantoprazole    Tablet 40 milliGRAM(s) Oral before breakfast  rosuvastatin 20 milliGRAM(s) Oral at bedtime  senna 2 Tablet(s) Oral at bedtime    MEDICATIONS  (PRN):  acetaminophen     Tablet .. 650 milliGRAM(s) Oral every 6 hours PRN Temp greater or equal to 38C (100.4F), Mild Pain (1 - 3)  polyethylene glycol 3350 17 Gram(s) Oral at bedtime PRN Constipation      PERTINENT LABS:  11-14 Na144 mmol/L Glu 109 mg/dL[H] K+ 3.9 mmol/L Cr  1.27 mg/dL BUN 36 mg/dL[H] 11-14 Alb 3.1 g/dL[L]        ESTIMATED NEEDS:   [X] No Change Since Previous Assessment    PREVIOUS NUTRITION DIAGNOSIS:  Malnutrition...  Moderate, acute  related to inadequate protein-energy intake s/p CVA, acute hospitalization, dislike of institutional foods  as evidenced by mild muscle wasting/fat loss, pt reports consuming <75% > 7 days      NUTRITION DIAGNOSIS IS [X] Ongoing     INTERVENTIONS:   1. Continue Current Nutrition Care Regimen at This Time.     MONITORING & EVALUATION:   1. Weights   2. PO intakes   3. Skin integrity   4. Tolerance to diet prescription   5. Labs & POCT  6. Follow up (per protocol)    Registered Dietitian/Nutritionist Remains Available.  Ramy Martinez RD    Contact: [phone] or via MS TEAMS

## 2024-11-14 NOTE — PROGRESS NOTE ADULT - ASSESSMENT
72 yo right handed zhou-speaking M with PMHX HTN, atrial fibrillation, former smoker x 34 years presented to [facility] on 10/17 with right ICA occlusion and right MCA infarct s/p thrombectomy TICI 3. Discharged from [facility] day before CVA for left pulmonary nodule s/p Left lung lobectomy s/p chest tube. Had been off blood thinners (10/2-10/16)  prior to surgery, then noted to by in AFIB on day of [facility] discharge so was loaded with amiodarone and restarted on Eliquis and Brilinta. Etiology of stroke cardio embolic due to Afib off AC due to recent surgery (10/2-10/16) vs hypercoagulability due to malignancy. Lung path showing adenocarcinoma, no hemo/onc workup started.  Admitted for multidisciplinary rehab program on 10/31.    #R MCA infarct s/p  thrombectomy TICI 3  - right ICA occlusion after the bifurcation extending to the supraclinoid ICA and right MCA, noted E/ICAD including L-ICA cavernous/supraclinoid segment multifocal stenosis, left VA mild to moderate stenosis. s/p thrombectomy 10/17  - Etiology of stroke cardio embolic due to Afib off AC due to recent surgery (10/2-10/16) vs hypercoagulability due to malignancy  - CTH 10/31 Stable, no hemorrhage  - Continue Crestor 20 mg nightly   - Continue ASA 81 mg daily  - Continue Eliquis 5mg BID  - Fall and aspiration precautions  - Comprehensive Multidisciplinary Rehab Program: Gait, ADL, Functional impairments PT/OT/ SLP 3 hours a day 5 days a week    #Left facial twitching suspect for partial seizures 11/1  - Ativan 1 mg IV push x1   - Keppra 1 g IV load and continue 500 mg BID orally - reducing to 250 mg BID on 11/8  - CT head urgent - stable   - EEG urgent -stable   - Prolactin, lactate -WNL   - Per family this started about 10 years prior after a [place] for which he did not follow up with neurology. 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 In consideration of the MRI picture (significant cortical involvement) will taper Keppra to 250 mg BID     #PAF  #Noted bradycardia on 11/1 now improved   - Amiodarone 100mg daily as per cardiology -to continue to monitor HR   - Resume Metoprolol 12.5 mg BID - 11/11 - HR improved   - ELIQUIS 5mg BID  - EKG stable 11/11  -Cardiology following      #HTN/HLD  - home metoprolol 25 mg BID stopped due to bradycardia -resumed at 12.5mg BID 11/11 - HR improved   - Continue Crestor 20mg HS  - Monitor BP     # Lung Adenocarcinoma s/p VATS lobectomy on 10/8  # Small loculated L pleural effusion post-op  - Follow up with [facility]/onc already established  - No treatment while in patient    #Pancreatic cyst, incidental finding on CT CAP  - Follow up with PCP for Pancreatic protocol imaging every 2 years for 10 years.     #Mood / Cognition:  - Neuropsych evaluation     #Sleep/mood  - Maintain quiet hours and low stim environment  - Melatonin 9mg at bedtime 11/8  -Continue Remeron 7.5mg daily 11/8 -to assist with mood/sleep/appetite   -Continue modafinil 50mg at 6am and 1pm for wakefulness 11/13    #Pain:  - Tylenol PRN  - avoid sedating meds that may affect cognitive recovery    #GI/Bowel:  - Senna 2 tabs daily  - Miralax prn  - GI ppx: None    #/Bladder:  - Monitor PVR if no void in 8h; SC for >400 cc  - Toileting schedule q4h    #Diet / Dysphagia:    - Diet: Soft and bite sized with thins  -MBS on 11/14 - continue soft and bite sized  - ongoing SLP assessment  - Nutrition to follow    #Skin/ Pressure Injury Prevention:   Mid abd scar, L upper flank incision sites x 4 , dry skin generalized, R foot lateral callus   - Turn Q2hrs in bed while awake, OOB to Chair, PT/OT/SLP    #DVT prophylaxis:  -Eliquis 5mg BID  - TEDs    #Precautions/ Restrictions  - Falls,   - Lungs: Aspiration, Incentive Spirometer      Outpatient Follow up:    Vitaly Dias MD  Internal medicine  [address] [place] [address] [phone]    Keke Lofton MD  [facility] Neurology  [address] [place] [address] [phone]

## 2024-11-14 NOTE — DISCHARGE NOTE PROVIDER - HOSPITAL COURSE
HPI:  73 year old right handed [facility]-speaking man with HTN, atrial fibrillation on Eliquis and Amiodarone, on Brilinta, former smoker x 34 years (quit 22months ago) discharged from  [facility] on 10/16 for left pulmonary nodule s/p Left lung lobectomy s/p chest tube presented to [facility] ED (10/17/24) with left sided weakness and left gaze preference. Daughter states patient last known normal at 11 am the last time she saw him well before she left to go to the store. She returned home at 1 pm and noted patient with left sided weakness and called EMS. Daughter confirmed she gave patient his Eliquis this morning. At baseline patient walks without assistive devices. On initial stroke evaluation, NIHSS-18 for patient awake with minimal verbal output right gaze preference not able to cross midline, left facial droop and LUE 0/5. LLE 1/5. CT with no ICH but noted dense right MCA. CTA head/neck with right ICA occlusion after the bifurcation extending to the supraclinoid ICA and right MCA, noted E/ICAD including L-ICA cavernous/supraclinoid segment mutlifocal stenosis, left VA mild to moderate stenosis. Patient not a candidate for TNK as on Eliquis and compliant. Patient underwent thrombectomy TICI 3. Patient admitted to MICU for close observation, required nicardipene drip. Repeat CTH with evolving Right MCA stroke without hemorrhage. Started ASA. Patient downgraded to Stroke Unit (10/19). A 1 c 6.1 (pre-OM), LDL 43 (wnl), TG 69 (wnl). TTE with mildly enlarged left atrium, mild concentric LVH, LV hyperdynamic EF >70%. [facility] CT surgery RAGINI Chacko from Dr. Mark Velez office [phone] reported that patient was on Eliquis and Brilinta prior to admission, admitted to [facility] 10/8 for left lower wedge x2 / lobectomy which required chest tube placement and was removed 10/16 and was told to hold blood thinners 3-7 days prior to admission. On discharge from [facility], EP was consulted as patient was in afib and recommend Amio load and resume Eliquis/Brilinta 10/16. Pathology resulted with adenocarcinoma but pending LN results for staging. No other HemOnc testing has been done. MRI brain with R-MCA territory infarct. Etiology of stroke cardio embolic due to Afib off AC due to recent surgery (10/2-10/16) vs hypercoagulability due to malignancy.      Rehab course significant for:   - Intermittent subcontinuous twitching of the left face observed after admission and initially attributed to seizures, investigated with repeat head CT and EEG (negative). Upon discussion with the family, it was reported that Mr. Garcia had this issue intermittently for several years, starting after a prior "stroke" when [place], and never followed up. The presence of an underlying peripheral VII CN deficit is more suggestive of a hemifacial spasm in the setting of likely prior Bell's palsy. However, in consideration of the MRI picture with significant cortical involvement patient was treated with levetiracetam for 14 days.   - Flat affect and inappetence, for which patient was started on low dose mirtazapine and modafinil   - Recurrent bradycardia requiring adjustments to treatment with antiarrhythmic and betablocker     All other medical co-morbidites were stable.    Pt tolerated course of inpatient pt/ot/slp rehab with significant functional improvements and met rehab goals prior to discharge.     Pt was medically cleared on 11/19/2024 for discharge to home in the care of his family.        HPI:  73 year old right handed [facility]-speaking man with HTN, atrial fibrillation on Eliquis and Amiodarone, on Brilinta, former smoker x 34 years (quit 22months ago) discharged from  [facility] on 10/16 for left pulmonary nodule s/p Left lung lobectomy s/p chest tube presented to [facility] ED (10/17/24) with left sided weakness and left gaze preference. Daughter states patient last known normal at 11 am the last time she saw him well before she left to go to the store. She returned home at 1 pm and noted patient with left sided weakness and called EMS. Daughter confirmed she gave patient his Eliquis this morning. At baseline patient walks without assistive devices. On initial stroke evaluation, NIHSS-18 for patient awake with minimal verbal output right gaze preference not able to cross midline, left facial droop and LUE 0/5. LLE 1/5. CT with no ICH but noted dense right MCA. CTA head/neck with right ICA occlusion after the bifurcation extending to the supraclinoid ICA and right MCA, noted E/ICAD including L-ICA cavernous/supraclinoid segment mutlifocal stenosis, left VA mild to moderate stenosis. Patient not a candidate for TNK as on Eliquis and compliant. Patient underwent thrombectomy TICI 3. Patient admitted to MICU for close observation, required nicardipene drip. Repeat CTH with evolving Right MCA stroke without hemorrhage. Started ASA. Patient downgraded to Stroke Unit (10/19). A 1 c 6.1 (pre-OM), LDL 43 (wnl), TG 69 (wnl). TTE with mildly enlarged left atrium, mild concentric LVH, LV hyperdynamic EF >70%. [facility] CT surgery RAGINI Chacko from Dr. Mark Velez office [phone] reported that patient was on Eliquis and Brilinta prior to admission, admitted to [facility] 10/8 for left lower wedge x2 / lobectomy which required chest tube placement and was removed 10/16 and was told to hold blood thinners 3-7 days prior to admission. On discharge from [facility], EP was consulted as patient was in afib and recommend Amio load and resume Eliquis/Brilinta 10/16. Pathology resulted with adenocarcinoma but pending LN results for staging. No other HemOnc testing has been done. MRI brain with R-MCA territory infarct. Etiology of stroke cardio embolic due to Afib off AC due to recent surgery (10/2-10/16) vs hypercoagulability due to malignancy.      Rehab course significant for:   - Intermittent subcontinuous twitching of the left face observed after admission and initially attributed to seizures, investigated with repeat head CT and EEG (negative). Upon discussion with the family, it was reported that Mr. Garcia had this issue intermittently for several years, starting after a prior "stroke" when [place] was [place], and never followed up. The presence of an underlying peripheral VII CN deficit is more suggestive of a hemifacial spasm in the setting of likely prior Bell's palsy. However, in consideration of the MRI picture with significant cortical involvement patient was treated with levetiracetam for 14 days  - Flat affect and inappetence, for which patient was started on low dose mirtazapine and modafinil   - Recurrent bradycardia requiring adjustments to treatment with antiarrhythmic and betablocker   -Tolerating tizanidine at bedtime for increased tone on left elbow    All other medical co-morbidites were stable.    Pt tolerated course of inpatient pt/ot/slp rehab with significant functional improvements and met rehab goals prior to discharge.     Pt was medically cleared on 11/19/2024 for discharge to home in the care of his family.

## 2024-11-14 NOTE — DISCHARGE NOTE PROVIDER - PROVIDER TOKENS
PROVIDER:[TOKEN:[[id number]]],PROVIDER:[TOKEN:[[id number]]],FREE:[LAST:[Vitaly],FIRST:[Arun,],PHONE:[phone]],FAX:[(   )    -],ADDRESS:[Internal medicine  [address] [place] [address]]],FREE:[LAST:[Kirsty],FIRST:[Keke],PHONE:[phone]],FAX:[(   )    -],ADDRESS:[[facility]  [address], [place] [address]]]

## 2024-11-14 NOTE — DISCHARGE NOTE PROVIDER - NSDCADMDATE_GEN_ALL_CORE_FT
FMLA or Disability Forms were received and faxed to the Forms Completion Department today at [phone]. (Please include all appropriate authorization forms with your fax).    Did you have the patient complete (in full) and sign the “Authorization For Disclosure of Health Information Forms Completion” form? No, Reason: Completed ELLIOTT on file    DUE TO VERY HIGH FORM VOLUMES, please communicate to the patient that the Forms Completion team estimates their form may take longer than our usual 14 business day turnaround goal.    If you have questions about this encounter, please contact the Forms Completion Dept at [phone], Option 3.     31-Oct-2024 16:56

## 2024-11-14 NOTE — DISCHARGE NOTE PROVIDER - CARE PROVIDER_API CALL
Mark Velez  Thoracic and Cardiac Surgery  [address] [place] [address], [address] [place] [address]  Phone: [phone]  Fax: [phone]  Follow Up Time:     Can Acosta  Cardiac Electrophysiology  [address] [place] [address], [address]  [place] [address]  Phone: [phone]  Fax: [phone]  Follow Up Time:     Arun Haider,  Internal medicine  [address] [place] [address]  Phone: [phone]  Fax: (   )    -  Follow Up Time:     Keke Lofton  [facility] Neurology  [address] [place] [address]  Phone: [phone]  Fax: (   )    -  Follow Up Time:

## 2024-11-14 NOTE — DISCHARGE NOTE PROVIDER - NSDCCPCAREPLAN_GEN_ALL_CORE_FT
PRINCIPAL DISCHARGE DIAGNOSIS  Diagnosis: Acute right MCA stroke  Assessment and Plan of Treatment: - s/p thrombectomy on 10/17 for right ICA occlusion extending to the supraclinoid ICA and right MCA, noted E/ICAD including L-ICA cavernous/supraclinoid segment multifocal stenosis, left VA mild to moderate stenosis  - Etiology of stroke cardio embolic due to Afib off AC due to recent surgery (10/2-10/16) vs hypercoagulability due to malignancy  - Continue aspirin 81 mg daily  - Continue Eliquis 5mg 2 times daily   - Continue with PT/OT/SLT  - Follow up with Neurology as outpatient      SECONDARY DISCHARGE DIAGNOSES  Diagnosis: Clonic hemifacial spasm, left  Assessment and Plan of Treatment: - Repeat CTH on 10/31 was stable   - EEG on 10/31 was negative for seizure activity    - Prolactin, lactate on 10/31 were within normal limits    - Likely chronic history of left Bell's palsy complicated by hemifacial spasm  - Follow up with neurology as needed    Diagnosis: PAF (paroxysmal atrial fibrillation)  Assessment and Plan of Treatment: - Continue Amiodarone 100mg daily as per cardiology   - Continue Metoprolol 12.5 mg 2 times daily   - Continue ELIQUIS 5mg 2 times daily   - Follow up with Cardiology/Electrophysiology as outpatient    Diagnosis: Hyperlipidemia  Assessment and Plan of Treatment: - Continue Crestor 20mg at bedtime       Diagnosis: Adenocarcinoma, lung  Assessment and Plan of Treatment: s/p VATS lobectomy on 10/8  - Follow up with [facility] hematology/oncology as outpatient    Diagnosis: Pancreatic cyst  Assessment and Plan of Treatment: incidental finding on CT CAP  - Follow up with PCP for Pancreatic protocol imaging every 2 years for 10 years      Diagnosis: Insomnia  Assessment and Plan of Treatment: - Continue with Melatonin 9mg at bedtime    Diagnosis: Reactive depression  Assessment and Plan of Treatment: - Continue Remeron 7.5mg at bedtime   - Continue modafinil 50mg at 6am and 1 pm    Diagnosis: Dysphagia, post-stroke  Assessment and Plan of Treatment: - Continue with adapted diet: Soft and bite-sized with thin liquids  - Continue with SLT to improve function     PRINCIPAL DISCHARGE DIAGNOSIS  Diagnosis: Acute right MCA stroke  Assessment and Plan of Treatment: - s/p thrombectomy on 10/17 for right ICA occlusion extending to the supraclinoid ICA and right MCA, noted E/ICAD including L-ICA cavernous/supraclinoid segment multifocal stenosis, left VA mild to moderate stenosis  - Etiology of stroke cardio embolic due to Afib off AC due to recent surgery (10/2-10/16) vs hypercoagulability due to malignancy  - Continue aspirin 81 mg daily  - Continue Eliquis 5mg 2 times daily   - Continue with PT/OT/SLT  - Follow up with Neurology as outpatient  -Continue xanaflex 2mg at bedtime   -Continue modafinil 50mg BID for wakefulness      SECONDARY DISCHARGE DIAGNOSES  Diagnosis: Clonic hemifacial spasm, left  Assessment and Plan of Treatment: - Repeat CTH on 10/31 was stable   - EEG on 10/31 was negative for seizure activity    - Prolactin, lactate on 10/31 were within normal limits    - Likely chronic history of left Bell's palsy complicated by hemifacial spasm  - Follow up with neurology as needed    Diagnosis: PAF (paroxysmal atrial fibrillation)  Assessment and Plan of Treatment: - Continue Amiodarone 100mg daily as per cardiology   - Continue ELIQUIS 5mg 2 times daily   - Follow up with Cardiology/Electrophysiology as outpatient    Diagnosis: Hyperlipidemia  Assessment and Plan of Treatment: - Continue Crestor 20mg at bedtime       Diagnosis: Adenocarcinoma, lung  Assessment and Plan of Treatment: s/p VATS lobectomy on 10/8  - Follow up with [facility] hematology/oncology as outpatient    Diagnosis: Pancreatic cyst  Assessment and Plan of Treatment: incidental finding on CT CAP  - Follow up with PCP for Pancreatic protocol imaging every 2 years for 10 years      Diagnosis: Insomnia  Assessment and Plan of Treatment: - Continue with Melatonin 9mg at bedtime    Diagnosis: Reactive depression  Assessment and Plan of Treatment: - Continue Remeron 7.5mg at bedtime   - Continue modafinil 50mg at 6am and 1 pm    Diagnosis: Dysphagia, post-stroke  Assessment and Plan of Treatment: - Continue with adapted diet: Soft and bite-sized with thin liquids  - Continue with SLT to improve function

## 2024-11-14 NOTE — DISCHARGE NOTE PROVIDER - NSDCMRMEDTOKEN_GEN_ALL_CORE_FT
acetaminophen 325 mg oral tablet: 2 tab(s) orally every 6 hours As needed Temp greater or equal to 38C (100.4F), Mild Pain (1 - 3)  amiodarone 200 mg oral tablet: 1 tab(s) orally once a day  amiodarone 400 mg oral tablet: 1 tab(s) orally 2 times a day  amiodarone 400 mg oral tablet: 1 tab(s) orally once a day  Eliquis 5 mg oral tablet: 1 tab(s) orally 2 times a day  ezetimibe 10 mg oral tablet: 1 tab(s) orally once a day  metoprolol tartrate 25 mg oral tablet: 1 tab(s) orally every 12 hours  Multiple Vitamins oral tablet: 1 tab(s) orally once a day last dose 9/30/24  oxyCODONE 5 mg oral tablet: 1 tab(s) orally every 4 hours as needed for Severe Pain (7 - 10) MDD: 6  polyethylene glycol 3350 oral powder for reconstitution: 17 gram(s) orally once a day (at bedtime)  rosuvastatin 20 mg oral capsule: 1 cap(s) orally once a day  senna leaf extract oral tablet: 2 tab(s) orally once a day (at bedtime)  ticagrelor 90 mg oral tablet: 1 tab(s) orally 2 times a day   acetaminophen 325 mg oral tablet: 3 tab(s) orally every 8 hours As needed Temp greater or equal to 38C (100.4F), Mild Pain (1 - 3)  amiodarone 200 mg oral tablet: 0.5 tab(s) orally once a day  apixaban 5 mg oral tablet: 1 tab(s) orally 2 times a day  aspirin 81 mg oral tablet, chewable: 1 tab(s) orally once a day  melatonin 3 mg oral tablet: 3 tab(s) orally once a day (at bedtime)  mirtazapine 7.5 mg oral tablet: 1 tab(s) orally once a day (at bedtime)  modafinil 100 mg oral tablet: 0.5 tab(s) orally 2 times a day at 6am and 1pm MDD: 100mg  Multiple Vitamins oral tablet: 1 tab(s) orally once a day last dose 9/30/24  pantoprazole 40 mg oral delayed release tablet: 1 tab(s) orally once a day (before a meal)  polyethylene glycol 3350 oral powder for reconstitution: 17 gram(s) orally once a day (at bedtime) As needed Constipation  rosuvastatin 20 mg oral tablet: 1 tab(s) orally once a day (at bedtime)  senna leaf extract oral tablet: 2 tab(s) orally once a day (at bedtime)  tiZANidine 2 mg oral tablet: 1 tab(s) orally once a day (at bedtime)

## 2024-11-14 NOTE — PROGRESS NOTE ADULT - SUBJECTIVE AND OBJECTIVE BOX
SUBJECTIVE/ROS: Patient seen in the chair. He appears more awake today with modafinil. He has no new complaints and denies chest pain, nausea, vomiting, abdominal pain, headache, or BLE pain.      HPI:  73 year old right handed [place]-speaking man with HTN, atrial fibrillation on Eliquis and Amiodarone, on Brilinta, former smoker x 34 years (quit 22months ago) discharged from  [facility] on 10/16 for left pulmonary nodule s/p Left lung lobectomy s/p chest tube presented to [facility] ED (10/17/24) with left sided weakness and left gaze preference. Daughter states patient last known normal at 11 am the last time she saw him well before she left to go to the store. She returned home at 1 pm and noted patient with left sided weakness and called EMS. Daughter confirmed she gave patient his Eliquis this morning. At baseline patient walks without assistive devices. On initial stroke evaluation, NIHSS-18 for patient awake with minimal verbal output right gaze preference not able to cross midline, left facial droop and LUE 0/5. LLE 1/5. CT with no ICH but noted dense right MCA. CTA head/neck with right ICA occlusion after the bifurcation extending to the supraclinoid ICA and right MCA, noted E/ICAD including L-ICA cavernous/supraclinoid segment mutlifocal stenosis, left VA mild to moderate stenosis. Patient not a candidate for TNK as on Eliquis and compliant. Patient underwent thrombectomy TICI 3. Patient admitted to MICU for close observation, required nicardipene drip. Repeat CTH with evolving Right MCA stroke without hemorrhage. Started ASA. Patient downgraded to Stroke Unit (10/19). A 1 c 6.1 (pre-OM), LDL 43 (wnl), TG 69 (wnl). TTE with mildly enlarged left atrium, mild concentric LVH, LV hyperdynamic EF >70%. [facility] CT surgery RAGINI Chacko from Dr. Mark Velez office [phone] reported that patient was on Eliquis and Brilinta prior to admission, admitted to [facility] 10/8 for left lower wedge x2 / lobectomy which required chest tube placement and was removed 10/16 and was told to hold blood thinners 3-7 days prior to admission. On discharge from [facility], EP was consulted as patient was in afib and recommend Amio load and resume Eliquis/Brilinta 10/16. Pathology resulted with adenocarcinoma but pending LN results for staging. No other HemOnc testing has been done. MRI brain with R-MCA territory infarct. Etiology of stroke cardio embolic due to Afib off AC due to recent surgery (10/2-10/16) vs hypercoagulability due to malignancy.      Vital Signs Last 24 Hrs  T(C): 36.8 (14 Nov 2024 07:41), Max: 36.8 (14 Nov 2024 07:41)  T(F): 98.3 (14 Nov 2024 07:41), Max: 98.3 (14 Nov 2024 07:41)  HR: 55 (14 Nov 2024 07:41) (53 - 61)  BP: 148/72 (14 Nov 2024 07:41) (124/84 - 148/72)  BP(mean): --  RR: 16 (14 Nov 2024 07:41) (16 - 16)  SpO2: 100% (14 Nov 2024 07:41) (95% - 100%)    Parameters below as of 14 Nov 2024 07:41  Patient On (Oxygen Delivery Method): room air          PHYSICAL EXAM  Constitutional - NAD, Comfortable in bed,   HEENT - NCAT, EOMI  Neck - Supple, No limited ROM  Chest - Breathing comfortably in room air   Cardiovascular - RR - bradycardia improving   Extremities -  No calf tenderness   Neurologic Exam - patient alert, partially oriented to place (hospital), intermittently oriented to time. Left facial twitching. Language normal. Mild/moderate dysarthria. L facial droop. Moves all limbs against gravity. RUE 3/5 proximally, 1/5 distally, LUE 4/5 proximally, 5/5 distally. Left tactile inattention.          LABS:                          13.7   4.46  )-----------( 114      ( 14 Nov 2024 05:41 )             41.1     11-14    144  |  109[H]  |  36[H]  ----------------------------<  109[H]  3.9   |  31  |  1.27    Ca    9.9      14 Nov 2024 05:41  Mg     2.0     11-14    TPro  6.5  /  Alb  3.1[L]  /  TBili  0.6  /  DBili  x   /  AST  47[H]  /  ALT  97[H]  /  AlkPhos  86  11-14    LIVER FUNCTIONS - ( 14 Nov 2024 05:41 )  Alb: 3.1 g/dL / Pro: 6.5 g/dL / ALK PHOS: 86 U/L / ALT: 97 U/L / AST: 47 U/L / GGT: x               MEDICATIONS  (STANDING):  aMIOdarone    Tablet 100 milliGRAM(s) Oral daily  apixaban 5 milliGRAM(s) Oral two times a day  aspirin  chewable 81 milliGRAM(s) Oral daily  levETIRAcetam 250 milliGRAM(s) Oral two times a day  melatonin 9 milliGRAM(s) Oral at bedtime  metoprolol tartrate 12.5 milliGRAM(s) Oral two times a day  mirtazapine 7.5 milliGRAM(s) Oral at bedtime  modafinil 50 milliGRAM(s) Oral <User Schedule>  pantoprazole    Tablet 40 milliGRAM(s) Oral before breakfast  rosuvastatin 20 milliGRAM(s) Oral at bedtime  senna 2 Tablet(s) Oral at bedtime    MEDICATIONS  (PRN):  acetaminophen     Tablet .. 650 milliGRAM(s) Oral every 6 hours PRN Temp greater or equal to 38C (100.4F), Mild Pain (1 - 3)  polyethylene glycol 3350 17 Gram(s) Oral at bedtime PRN Constipation      Function: IDT 11/11  Ambulating 250ft with no device CG 12 stairs with 2 hand rails min assist  Est dc 11/19 with 24hr supervision

## 2024-11-15 LAB
APPEARANCE UR: ABNORMAL
BACTERIA # UR AUTO: ABNORMAL /HPF
BILIRUB UR-MCNC: NEGATIVE — SIGNIFICANT CHANGE UP
COLOR SPEC: YELLOW — SIGNIFICANT CHANGE UP
DIFF PNL FLD: NEGATIVE — SIGNIFICANT CHANGE UP
GLUCOSE UR QL: NEGATIVE MG/DL — SIGNIFICANT CHANGE UP
KETONES UR-MCNC: NEGATIVE MG/DL — SIGNIFICANT CHANGE UP
LEUKOCYTE ESTERASE UR-ACNC: NEGATIVE — SIGNIFICANT CHANGE UP
NITRITE UR-MCNC: NEGATIVE — SIGNIFICANT CHANGE UP
PH UR: 5.5 — SIGNIFICANT CHANGE UP (ref 5–8)
PROT UR-MCNC: 100 MG/DL
RBC CASTS # UR COMP ASSIST: 1 /HPF — SIGNIFICANT CHANGE UP (ref 0–4)
SP GR SPEC: 1.02 — SIGNIFICANT CHANGE UP (ref 1–1.03)
UROBILINOGEN FLD QL: 1 MG/DL — SIGNIFICANT CHANGE UP (ref 0.2–1)
WBC UR QL: 3 /HPF — SIGNIFICANT CHANGE UP (ref 0–5)

## 2024-11-15 PROCEDURE — 99232 SBSQ HOSP IP/OBS MODERATE 35: CPT

## 2024-11-15 RX ORDER — TIZANIDINE 4 MG/1
2 TABLET ORAL AT BEDTIME
Refills: 0 | Status: DISCONTINUED | OUTPATIENT
Start: 2024-11-15 | End: 2024-11-19

## 2024-11-15 RX ADMIN — APIXABAN 5 MILLIGRAM(S): 2.5 TABLET, FILM COATED ORAL at 18:42

## 2024-11-15 RX ADMIN — MODAFINIL 50 MILLIGRAM(S): 200 TABLET ORAL at 05:54

## 2024-11-15 RX ADMIN — ACETAMINOPHEN 500MG 650 MILLIGRAM(S): 500 TABLET, COATED ORAL at 09:34

## 2024-11-15 RX ADMIN — ROSUVASTATIN CALCIUM 20 MILLIGRAM(S): 5 TABLET, FILM COATED ORAL at 22:40

## 2024-11-15 RX ADMIN — ACETAMINOPHEN, DIPHENHYDRAMINE HCL, PHENYLEPHRINE HCL 9 MILLIGRAM(S): 325; 25; 5 TABLET ORAL at 22:43

## 2024-11-15 RX ADMIN — AMIODARONE HYDROCHLORIDE 100 MILLIGRAM(S): 200 TABLET ORAL at 05:52

## 2024-11-15 RX ADMIN — Medication 2 TABLET(S): at 22:40

## 2024-11-15 RX ADMIN — Medication 81 MILLIGRAM(S): at 13:06

## 2024-11-15 RX ADMIN — METOPROLOL TARTRATE 12.5 MILLIGRAM(S): 100 TABLET, FILM COATED ORAL at 05:20

## 2024-11-15 RX ADMIN — TIZANIDINE 2 MILLIGRAM(S): 4 TABLET ORAL at 22:44

## 2024-11-15 RX ADMIN — APIXABAN 5 MILLIGRAM(S): 2.5 TABLET, FILM COATED ORAL at 05:19

## 2024-11-15 RX ADMIN — MIRTAZAPINE 7.5 MILLIGRAM(S): 15 TABLET, FILM COATED ORAL at 22:43

## 2024-11-15 RX ADMIN — MODAFINIL 50 MILLIGRAM(S): 200 TABLET ORAL at 13:06

## 2024-11-15 RX ADMIN — PANTOPRAZOLE SODIUM 40 MILLIGRAM(S): 40 TABLET, DELAYED RELEASE ORAL at 05:19

## 2024-11-15 NOTE — PROGRESS NOTE ADULT - ASSESSMENT
74 yo right handed [place]-speaking M with PMHX HTN, atrial fibrillation, former smoker x 34 years presented to [facility] on 10/17 with right ICA occlusion and right MCA infarct s/p thrombectomy TICI 3. Discharged from [facility] day before CVA for left pulmonary nodule s/p Left lung lobectomy s/p chest tube. Had been off blood thinners (10/2-10/16)  prior to surgery, then noted to by in AFIB on day of [facility] discharge so was loaded with amiodarone and restarted on Eliquis and Brilinta. Etiology of stroke cardio embolic due to Afib off AC due to recent surgery (10/2-10/16) vs hypercoagulability due to malignancy. Lung path showing adenocarcinoma, no hemo/onc workup started.  Admitted for multidisciplinary rehab program on 10/31.    #R MCA infarct s/p  thrombectomy TICI 3  - right ICA occlusion after the bifurcation extending to the supraclinoid ICA and right MCA, noted E/ICAD including L-ICA cavernous/supraclinoid segment multifocal stenosis, left VA mild to moderate stenosis. s/p thrombectomy 10/17  - Etiology of stroke cardio embolic due to Afib off AC due to recent surgery (10/2-10/16) vs hypercoagulability due to malignancy  - CTH 10/31 Stable, no hemorrhage  - Continue Crestor 20 mg nightly   - Continue ASA 81 mg daily  - Continue Eliquis 5mg BID  - Fall and aspiration precautions  - Comprehensive Multidisciplinary Rehab Program: Gait, ADL, Functional impairments PT/OT/ SLP 3 hours a day 5 days a week  -Start tizanidine 2mg at bedtime for increased tone in left elbow 11/15    #Left facial twitching suspect for partial seizures 11/1  - Ativan 1 mg IV push x1   - Keppra 1 g IV load and continue 500 mg BID orally - reducing to 250 mg BID on 11/8  - CT head urgent - stable   - EEG urgent -stable   - Prolactin, lactate -WNL   - Per family this started about 10 years prior after a first "[place]" [place], for which he did not follow up with neurology. 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 In consideration of the MRI picture (significant cortical involvement) will taper Keppra to 250 mg BID     #PAF  #Noted bradycardia on 11/1 now improved   - Amiodarone 100mg daily as per cardiology -to continue to monitor HR   - Resume Metoprolol 12.5 mg BID - 11/11 - HR improved   - ELIQUIS 5mg BID  - EKG stable 11/11  -Cardiology following      #HTN/HLD  - home metoprolol 25 mg BID stopped due to bradycardia -resumed at 12.5mg BID 11/11 - HR improved   - Continue Crestor 20mg HS  - Monitor BP     # Lung Adenocarcinoma s/p VATS lobectomy on 10/8  # Small loculated L pleural effusion post-op  - Follow up with [facility]/onc already established  - No treatment while in patient    #Pancreatic cyst, incidental finding on CT CAP  - Follow up with PCP for Pancreatic protocol imaging every 2 years for 10 years.     #Mood / Cognition:  - Neuropsych evaluation     #Sleep/mood  - Maintain quiet hours and low stim environment  - Melatonin 9mg at bedtime 11/8  -Continue Remeron 7.5mg daily 11/8 -to assist with mood/sleep/appetite   -Continue modafinil 50mg at 6am and 1pm for wakefulness 11/13    #Pain:  - Tylenol PRN  - avoid sedating meds that may affect cognitive recovery    #GI/Bowel:  - Senna 2 tabs daily  - Miralax prn  - GI ppx: None    #/Bladder:  - Monitor PVR if no void in 8h; SC for >400 cc  - Toileting schedule q4h    #Diet / Dysphagia:    - Diet: Soft and bite sized with thins  -MBS on 11/14 - continue soft and bite sized  - ongoing SLP assessment  - Nutrition to follow    #Skin/ Pressure Injury Prevention:   Mid abd scar, L upper flank incision sites x 4 , dry skin generalized, R foot lateral callus   - Turn Q2hrs in bed while awake, OOB to Chair, PT/OT/SLP    #DVT prophylaxis:  -Eliquis 5mg BID  - TEDs    #Precautions/ Restrictions  - Falls,   - Lungs: Aspiration, Incentive Spirometer      Outpatient Follow up:    Vitaly Dias MD  Internal medicine  [address] [place] [address] [phone]    Keke Lofton MD  [facility]  [address] [place] [address] [phone]

## 2024-11-15 NOTE — PROGRESS NOTE ADULT - SUBJECTIVE AND OBJECTIVE BOX
SUBJECTIVE/ROS: Patient seen in the room. He is following commands well and appears more alert. On exam, he was noted to have increased tone in the left elbow. He otherwise denies chest pain, nausea, vomiting, abdominal pain, headache, or BLE pain.      HPI:  73 year old right handed [place]-speaking man with HTN, atrial fibrillation on Eliquis and Amiodarone, on Brilinta, former smoker x 34 years (quit 22months ago) discharged from  [facility] on 10/16 for left pulmonary nodule s/p Left lung lobectomy s/p chest tube presented to [facility] system ED (10/17/24) with left sided weakness and left gaze preference. Daughter states patient last known normal at 11 am the last time she saw him well before she left to go to the store. She returned home at 1 pm and noted patient with left sided weakness and called EMS. Daughter confirmed she gave patient his Eliquis this morning. At baseline patient walks without assistive devices. On initial stroke evaluation, NIHSS-18 for patient awake with minimal verbal output right gaze preference not able to cross midline, left facial droop and LUE 0/5. LLE 1/5. CT with no ICH but noted dense right MCA. CTA head/neck with right ICA occlusion after the bifurcation extending to the supraclinoid ICA and right MCA, noted E/ICAD including L-ICA cavernous/supraclinoid segment mutlifocal stenosis, left VA mild to moderate stenosis. Patient not a candidate for TNK as on Eliquis and compliant. Patient underwent thrombectomy TICI 3. Patient admitted to MICU for close observation, required nicardipene drip. Repeat CTH with evolving Right MCA stroke without hemorrhage. Started ASA. Patient downgraded to Stroke Unit (10/19). A 1 c 6.1 (pre-OM), LDL 43 (wnl), TG 69 (wnl). TTE with mildly enlarged left atrium, mild concentric LVH, LV hyperdynamic EF >70%. [facility] CT surgery RAGINI Chacko from Dr. Mark Velez office [phone] reported that patient was on Eliquis and Brilinta prior to admission, admitted to [facility] 10/8 for left lower wedge x2 / lobectomy which required chest tube placement and was removed 10/16 and was told to hold blood thinners 3-7 days prior to admission. On discharge from [facility], EP was consulted as patient was in afib and recommend Amio load and resume Eliquis/Brilinta 10/16. Pathology resulted with adenocarcinoma but pending LN results for staging. No other HemOnc testing has been done. MRI brain with R-MCA territory infarct. Etiology of stroke cardio embolic due to Afib off AC due to recent surgery (10/2-10/16) vs hypercoagulability due to malignancy.      Vital Signs Last 24 Hrs  T(C): 36.7 (15 Nov 2024 08:13), Max: 36.7 (14 Nov 2024 20:59)  T(F): 98.1 (15 Nov 2024 08:13), Max: 98.1 (14 Nov 2024 20:59)  HR: 46 (15 Nov 2024 08:13) (46 - 76)  BP: 153/71 (15 Nov 2024 08:13) (119/79 - 153/71)  BP(mean): --  RR: 16 (15 Nov 2024 08:13) (16 - 16)  SpO2: 99% (15 Nov 2024 08:13) (98% - 99%)    Parameters below as of 15 Nov 2024 08:13  Patient On (Oxygen Delivery Method): room air          PHYSICAL EXAM  Constitutional - NAD, Comfortable in bed,   HEENT - NCAT, EOMI  Neck - Supple, No limited ROM  Chest - Breathing comfortably in room air   Cardiovascular - RR - bradycardia improving   Extremities -  No calf tenderness   Neurologic Exam - patient alert, partially oriented to place (hospital), intermittently oriented to time. Left facial twitching. Language normal. Mild/moderate dysarthria. L facial droop. Moves all limbs against gravity. RUE 4/5, LUE 4/5 proximally and 2/5 distally, RLE 5/5, LLE 4/5. Left tactile inattention.          LABS:                          13.7   4.46  )-----------( 114      ( 14 Nov 2024 05:41 )             41.1     11-14    144  |  109[H]  |  36[H]  ----------------------------<  109[H]  3.9   |  31  |  1.27    Ca    9.9      14 Nov 2024 05:41  Mg     2.0     11-14    TPro  6.5  /  Alb  3.1[L]  /  TBili  0.6  /  DBili  x   /  AST  47[H]  /  ALT  97[H]  /  AlkPhos  86  11-14    LIVER FUNCTIONS - ( 14 Nov 2024 05:41 )  Alb: 3.1 g/dL / Pro: 6.5 g/dL / ALK PHOS: 86 U/L / ALT: 97 U/L / AST: 47 U/L / GGT: x             MEDICATIONS  (STANDING):  aMIOdarone    Tablet 100 milliGRAM(s) Oral daily  apixaban 5 milliGRAM(s) Oral two times a day  aspirin  chewable 81 milliGRAM(s) Oral daily  melatonin 9 milliGRAM(s) Oral at bedtime  metoprolol tartrate 12.5 milliGRAM(s) Oral two times a day  mirtazapine 7.5 milliGRAM(s) Oral at bedtime  modafinil 50 milliGRAM(s) Oral <User Schedule>  pantoprazole    Tablet 40 milliGRAM(s) Oral before breakfast  rosuvastatin 20 milliGRAM(s) Oral at bedtime  senna 2 Tablet(s) Oral at bedtime  tiZANidine 2 milliGRAM(s) Oral at bedtime    MEDICATIONS  (PRN):  acetaminophen     Tablet .. 650 milliGRAM(s) Oral every 6 hours PRN Temp greater or equal to 38C (100.4F), Mild Pain (1 - 3)  polyethylene glycol 3350 17 Gram(s) Oral at bedtime PRN Constipation

## 2024-11-16 PROCEDURE — 99233 SBSQ HOSP IP/OBS HIGH 50: CPT | Mod: GC

## 2024-11-16 PROCEDURE — 99232 SBSQ HOSP IP/OBS MODERATE 35: CPT

## 2024-11-16 PROCEDURE — 93010 ELECTROCARDIOGRAM REPORT: CPT

## 2024-11-16 RX ORDER — ACETAMINOPHEN 500MG 500 MG/1
975 TABLET, COATED ORAL EVERY 6 HOURS
Refills: 0 | Status: DISCONTINUED | OUTPATIENT
Start: 2024-11-16 | End: 2024-11-16

## 2024-11-16 RX ORDER — ACETAMINOPHEN 500MG 500 MG/1
975 TABLET, COATED ORAL EVERY 8 HOURS
Refills: 0 | Status: DISCONTINUED | OUTPATIENT
Start: 2024-11-16 | End: 2024-11-19

## 2024-11-16 RX ADMIN — PANTOPRAZOLE SODIUM 40 MILLIGRAM(S): 40 TABLET, DELAYED RELEASE ORAL at 05:21

## 2024-11-16 RX ADMIN — ACETAMINOPHEN 500MG 650 MILLIGRAM(S): 500 TABLET, COATED ORAL at 08:14

## 2024-11-16 RX ADMIN — ACETAMINOPHEN, DIPHENHYDRAMINE HCL, PHENYLEPHRINE HCL 9 MILLIGRAM(S): 325; 25; 5 TABLET ORAL at 20:13

## 2024-11-16 RX ADMIN — TIZANIDINE 2 MILLIGRAM(S): 4 TABLET ORAL at 20:13

## 2024-11-16 RX ADMIN — MODAFINIL 50 MILLIGRAM(S): 200 TABLET ORAL at 12:12

## 2024-11-16 RX ADMIN — MIRTAZAPINE 7.5 MILLIGRAM(S): 15 TABLET, FILM COATED ORAL at 20:13

## 2024-11-16 RX ADMIN — APIXABAN 5 MILLIGRAM(S): 2.5 TABLET, FILM COATED ORAL at 17:20

## 2024-11-16 RX ADMIN — Medication 2 TABLET(S): at 20:13

## 2024-11-16 RX ADMIN — POLYETHYLENE GLYCOL 3350 17 GRAM(S): 17 POWDER, FOR SOLUTION ORAL at 17:20

## 2024-11-16 RX ADMIN — APIXABAN 5 MILLIGRAM(S): 2.5 TABLET, FILM COATED ORAL at 05:21

## 2024-11-16 RX ADMIN — AMIODARONE HYDROCHLORIDE 100 MILLIGRAM(S): 200 TABLET ORAL at 05:21

## 2024-11-16 RX ADMIN — Medication 81 MILLIGRAM(S): at 12:12

## 2024-11-16 RX ADMIN — ROSUVASTATIN CALCIUM 20 MILLIGRAM(S): 5 TABLET, FILM COATED ORAL at 20:13

## 2024-11-16 RX ADMIN — MODAFINIL 50 MILLIGRAM(S): 200 TABLET ORAL at 05:20

## 2024-11-16 NOTE — PROGRESS NOTE ADULT - ASSESSMENT
73 year old male with PMH of HTN, atrial fibrillation (on eliquis), former smoker x 34 years presented to [facility] on 10/17 with right ICA occlusion and right MCA infarct s/p thrombectomy TICI 3. Discharged from [facility] day before CVA for left pulmonary nodule s/p Left lung lobectomy s/p chest tube. Had been off blood thinners (10/2-10/16)  prior to surgery, then noted to by in AFIB on day of [facility] discharge so was loaded with amiodarone and restarted on Eliquis and Brilinta. Etiology of stroke cardio embolic due to Afib off AC due to recent surgery (10/2-10/16) vs hypercoagulability due to malignancy. Lung path showing adenocarcinoma, no hemo/onc workup started.  Admitted for multidisciplinary rehab program on 10/31.    #R MCA infarct s/p thrombectomy TICI 3  -Right ICA occlusion after the bifurcation extending to the supraclinoid ICA and right MCA, noted E/ICAD including L-ICA cavernous/supraclinoid segment mutlifocal stenosis, left VA mild to moderate stenosis.   -s/p thrombectomy 10/17  -Etiology of stroke cardio embolic due to Afib off AC due to recent surgery (10/2-10/16) vs hypercoagulability due to malignancy  -CTH 10/31 stable, no hemorrhage  -Continue seizure PPx: Keppra   -Continue Crestor   -Continue ASA 81 mg daily  -Continue Eliquis    #PAF  #Asymptomatic Sinus Bradycardia  -EKG 11/3 showed sinus bradycardia  - Metoprolol was initially discontinued for significant bradycardia, patient had episode of tachycardia during PT and was resumed by primary team during the week.   - Can continue metoprolol with holding parameters.  - Amiodarone was reduced from 200 mg daily to 100 mg daily per cardio on 11/7 for bradycardia  - Continue Eliquis 5mg BID  - Patient will need yearly checks of LFTs, thyroid, EKG and lungs/PFTs for surveillance to assess for amiodarone toxicity. TSH now normal  - Keep K >4 and Mg>2  - cardio cx noted and appreciated     #HTN  - continue Metoprolol Tart 12.5 mg BID  - DASh  - monitor VS including OH periodically    #HLD  -Continue Crestor     #Lung Adenocarcinoma s/p VATS lobectomy on 10/8  #Small loculated L pleural effusion post-op  -Follow up with [facility]/onc already established  -No treatment while in patient    #Pancreatic cyst, incidental finding on CT CAP  -Follow up with PCP for Pancreatic protocol imaging every 2 years for 10 years    #Transaminitis (Stable)  -Monitor CMP, c/w Crestor for now   #GI ppx - PPI    #DVT prophylaxis  -Eliquis 5mg BID    d/w Rehab team at [facility]

## 2024-11-16 NOTE — PROGRESS NOTE ADULT - SUBJECTIVE AND OBJECTIVE BOX
Patient seen and examined at bedside. some left hand discomfort. has had ongoing pain and poor mobility in left hand. hand with small area of ecchymosis which is unchanged. no swelling. pulses in tact.      ALLERGIES:  No Known Allergies    MEDICATIONS  (STANDING):  aMIOdarone    Tablet 100 milliGRAM(s) Oral daily  apixaban 5 milliGRAM(s) Oral two times a day  aspirin  chewable 81 milliGRAM(s) Oral daily  melatonin 9 milliGRAM(s) Oral at bedtime  mirtazapine 7.5 milliGRAM(s) Oral at bedtime  modafinil 50 milliGRAM(s) Oral <User Schedule>  pantoprazole    Tablet 40 milliGRAM(s) Oral before breakfast  rosuvastatin 20 milliGRAM(s) Oral at bedtime  senna 2 Tablet(s) Oral at bedtime  tiZANidine 2 milliGRAM(s) Oral at bedtime    MEDICATIONS  (PRN):  acetaminophen     Tablet .. 650 milliGRAM(s) Oral every 6 hours PRN Temp greater or equal to 38C (100.4F), Mild Pain (1 - 3)  polyethylene glycol 3350 17 Gram(s) Oral at bedtime PRN Constipation    Vital Signs Last 24 Hrs  T(F): 98.2 (2024 08:19), Max: 98.2 (2024 08:19)  HR: 59 (2024 08:19) (59 - 66)  BP: 131/71 (2024 08:19) (117/69 - 150/86)  RR: 16 (2024 08:19) (16 - 16)  SpO2: 95% (2024 08:19) (95% - 96%)  I&O's Summary      PHYSICAL EXAM:    Gen: nad, resting in bed  Neuro: aaox3, no focal deficits  Heent: eomi b/l, no jvd, no oral exudates  Pulm: cta b/l, no w/r/r  CV: +s1s2, no m/r/g  Ab: soft, nt/nd, normoactive bs x 4  Extrem: no edema, pulses intact and equal  Skin: small area of ecchymosis on left hand  Psych: normal    LABS:                        13.7   4.46  )-----------( 114      ( 2024 05:41 )             41.1       11-14    144  |  109  |  36  ----------------------------<  109  3.9   |  31  |  1.27    Ca    9.9      2024 05:41  Mg     2.0         TPro  6.5  /  Alb  3.1  /  TBili  0.6  /  DBili  x   /  AST  47  /  ALT  97  /  AlkPhos  86                                    Urinalysis Basic - ( 15 Nov 2024 07:24 )    Color: Yellow / Appearance: Cloudy / S.025 / pH: x  Gluc: x / Ketone: Negative mg/dL  / Bili: Negative / Urobili: 1.0 mg/dL   Blood: x / Protein: 100 mg/dL / Nitrite: Negative   Leuk Esterase: Negative / RBC: 1 /HPF / WBC 3 /HPF   Sq Epi: x / Non Sq Epi: x / Bacteria: Few /HPF        COVID-19 PCR: NotDetec (10-31-24 @ 17:56)      RADIOLOGY & ADDITIONAL TESTS:    Care Discussed with Consultants/Other Providers:

## 2024-11-16 NOTE — PROGRESS NOTE ADULT - SUBJECTIVE AND OBJECTIVE BOX
CC: Abnormality of gait and mobility, Right MCA CVA    HPI: Patient with no new medical issues today, intermittent command following  Pain controlled.  Has been tolerating rehabilitation program.    acetaminophen     Tablet .. 650 milliGRAM(s) Oral every 6 hours PRN  aMIOdarone    Tablet 100 milliGRAM(s) Oral daily  apixaban 5 milliGRAM(s) Oral two times a day  aspirin  chewable 81 milliGRAM(s) Oral daily  melatonin 9 milliGRAM(s) Oral at bedtime  mirtazapine 7.5 milliGRAM(s) Oral at bedtime  modafinil 50 milliGRAM(s) Oral <User Schedule>  pantoprazole    Tablet 40 milliGRAM(s) Oral before breakfast  polyethylene glycol 3350 17 Gram(s) Oral at bedtime PRN  rosuvastatin 20 milliGRAM(s) Oral at bedtime  senna 2 Tablet(s) Oral at bedtime  tiZANidine 2 milliGRAM(s) Oral at bedtime       T(C): 36.8 (11-16-24 @ 08:19), Max: 36.8 (11-16-24 @ 08:19)  HR: 59 (11-16-24 @ 08:19) (59 - 66)  BP: 131/71 (11-16-24 @ 08:19) (117/69 - 150/86)  RR: 16 (11-16-24 @ 08:19) (16 - 16)  SpO2: 95% (11-16-24 @ 08:19) (95% - 96%)    In NAD  HEENT- EOMI  Heart- Well Perfused  Lungs- No resp distress, no use of accessory resp muscles  Abd- + BS, NT  Ext- No calf pain  Neuro- Exam unchanged, intermittent command following  Psych- Affect wnl        Assessment: Patient with diagnosis of Right MCA CVA admitted for comprehensive acute rehabilitation.    Plan:  - R MCA CVA, cardioembolic source vs hypercoagulability - s/p thrombectomy. CT 10/31 Stable, no hemorrhage. C/w Crestor, ASA, Eliquis, Continue therapies as tolerated  - A-fib- amiodarone metoprolol, Eliquis, cardio input appreciated  - Mood/sleep- Remeron, melatonin  - Wakefulness- modafinil   - Bowel- senna, Miralax   - Tone- tizanidine 2 mg qhs  - Meeting with nursing staff and hospitalist this morning to discuss medical updates.  - Continue current medications; patient medically stable.   - Patient is stable to continue current rehabilitation program.

## 2024-11-17 PROCEDURE — 93010 ELECTROCARDIOGRAM REPORT: CPT

## 2024-11-17 PROCEDURE — 99232 SBSQ HOSP IP/OBS MODERATE 35: CPT

## 2024-11-17 PROCEDURE — 99232 SBSQ HOSP IP/OBS MODERATE 35: CPT | Mod: GC

## 2024-11-17 RX ADMIN — MODAFINIL 50 MILLIGRAM(S): 200 TABLET ORAL at 12:10

## 2024-11-17 RX ADMIN — Medication 81 MILLIGRAM(S): at 12:10

## 2024-11-17 RX ADMIN — PANTOPRAZOLE SODIUM 40 MILLIGRAM(S): 40 TABLET, DELAYED RELEASE ORAL at 05:45

## 2024-11-17 RX ADMIN — AMIODARONE HYDROCHLORIDE 100 MILLIGRAM(S): 200 TABLET ORAL at 05:45

## 2024-11-17 RX ADMIN — MIRTAZAPINE 7.5 MILLIGRAM(S): 15 TABLET, FILM COATED ORAL at 20:14

## 2024-11-17 RX ADMIN — TIZANIDINE 2 MILLIGRAM(S): 4 TABLET ORAL at 20:14

## 2024-11-17 RX ADMIN — APIXABAN 5 MILLIGRAM(S): 2.5 TABLET, FILM COATED ORAL at 05:45

## 2024-11-17 RX ADMIN — ACETAMINOPHEN, DIPHENHYDRAMINE HCL, PHENYLEPHRINE HCL 9 MILLIGRAM(S): 325; 25; 5 TABLET ORAL at 20:14

## 2024-11-17 RX ADMIN — POLYETHYLENE GLYCOL 3350 17 GRAM(S): 17 POWDER, FOR SOLUTION ORAL at 14:06

## 2024-11-17 RX ADMIN — MODAFINIL 50 MILLIGRAM(S): 200 TABLET ORAL at 05:45

## 2024-11-17 RX ADMIN — APIXABAN 5 MILLIGRAM(S): 2.5 TABLET, FILM COATED ORAL at 17:04

## 2024-11-17 RX ADMIN — ROSUVASTATIN CALCIUM 20 MILLIGRAM(S): 5 TABLET, FILM COATED ORAL at 20:14

## 2024-11-17 NOTE — PROGRESS NOTE ADULT - SUBJECTIVE AND OBJECTIVE BOX
Hospitalist: Stephanie Mahmood DO    CHIEF COMPLAINT: Patient is a 73y old  male who presents with a chief complaint of R-MCA CVA (17 Nov 2024 10:10)      SUBJECTIVE / OVERNIGHT EVENTS: Patient seen and examined. No acute events overnight. Pain well controlled and patient without any complaints.    MEDICATIONS  (STANDING):  aMIOdarone    Tablet 100 milliGRAM(s) Oral daily  apixaban 5 milliGRAM(s) Oral two times a day  aspirin  chewable 81 milliGRAM(s) Oral daily  melatonin 9 milliGRAM(s) Oral at bedtime  mirtazapine 7.5 milliGRAM(s) Oral at bedtime  modafinil 50 milliGRAM(s) Oral <User Schedule>  pantoprazole    Tablet 40 milliGRAM(s) Oral before breakfast  rosuvastatin 20 milliGRAM(s) Oral at bedtime  senna 2 Tablet(s) Oral at bedtime  tiZANidine 2 milliGRAM(s) Oral at bedtime    MEDICATIONS  (PRN):  acetaminophen     Tablet .. 975 milliGRAM(s) Oral every 8 hours PRN Temp greater or equal to 38C (100.4F), Mild Pain (1 - 3)  polyethylene glycol 3350 17 Gram(s) Oral at bedtime PRN Constipation      VITALS:  T(F): 98.2 (11-17-24 @ 07:30), Max: 98.2 (11-17-24 @ 07:30)  HR: 55 (11-17-24 @ 07:30) (55 - 62)  BP: 142/70 (11-17-24 @ 07:30) (136/71 - 144/74)  RR: 15 (11-17-24 @ 07:30) (15 - 16)  SpO2: 98% (11-17-24 @ 07:30)      REVIEW OF SYSTEMS:  For ROV please refer back to H&P     PHYSICAL EXAM:  Gen: nad, resting in bed  Neuro: aaox3, no focal deficits  Heent: eomi b/l, no jvd, no oral exudates  Pulm: cta b/l, no w/r/r  CV: +s1s2, no m/r/g  Ab: soft, nt/nd, normoactive bs x 4  Extrem: no edema, pulses intact and equal  Skin: small area of ecchymosis on left hand  Psych: normal    RADIOLOGY & ADDITIONAL TESTS:    Imaging Personally Reviewed:    [X] Consultant(s) Notes Reviewed:  [X] Care Discussed with Consultants/Other Providers:

## 2024-11-17 NOTE — PROGRESS NOTE ADULT - ASSESSMENT
73 year old male with PMH of HTN, atrial fibrillation (on eliquis), former smoker x 34 years presented to [facility] on 10/17 with right ICA occlusion and right MCA infarct s/p thrombectomy TICI 3. Discharged from [facility] day before CVA for left pulmonary nodule s/p Left lung lobectomy s/p chest tube. Had been off blood thinners (10/2-10/16)  prior to surgery, then noted to by in AFIB on day of [facility] discharge so was loaded with amiodarone and restarted on Eliquis and Brilinta. Etiology of stroke cardio embolic due to Afib off AC due to recent surgery (10/2-10/16) vs hypercoagulability due to malignancy. Lung path showing adenocarcinoma, no hemo/onc workup started.  Admitted for multidisciplinary rehab program on 10/31.    #R MCA infarct s/p thrombectomy TICI 3  -Right ICA occlusion after the bifurcation extending to the supraclinoid ICA and right MCA, noted E/ICAD including L-ICA cavernous/supraclinoid segment mutlifocal stenosis, left VA mild to moderate stenosis.   -s/p thrombectomy 10/17  -Etiology of stroke cardio embolic due to Afib off AC due to recent surgery (10/2-10/16) vs hypercoagulability due to malignancy  -CTH 10/31 stable, no hemorrhage  -Continue seizure PPx: Keppra   -Continue Crestor   -Continue ASA 81 mg daily  -Continue Eliquis    #PAF  #Asymptomatic Sinus Bradycardia  -EKG 11/3 showed sinus bradycardia  - Metoprolol was initially discontinued for significant bradycardia, patient had episode of tachycardia during PT and was resumed by primary team during the week.   - Can continue metoprolol with holding parameters.  - Amiodarone was reduced from 200 mg daily to 100 mg daily per cardio on 11/7 for bradycardia  - Continue Eliquis 5mg BID  - Patient will need yearly checks of LFTs, thyroid, EKG and lungs/PFTs for surveillance to assess for amiodarone toxicity. TSH now normal  - Keep K >4 and Mg>2  - cardio cx noted and appreciated     #HTN  - continue Metoprolol Tart 12.5 mg BID  - DASh  - monitor VS including OH periodically    #HLD  -Continue Crestor     #Lung Adenocarcinoma s/p VATS lobectomy on 10/8  #Small loculated L pleural effusion post-op  -Follow up with [facility]/onc already established  -No treatment while in patient    #Pancreatic cyst, incidental finding on CT CAP  -Follow up with PCP for Pancreatic protocol imaging every 2 years for 10 years    #Transaminitis (Stable)  -Monitor CMP, c/w Crestor for now   #GI ppx - PPI    #DVT prophylaxis  -Eliquis 5mg BID

## 2024-11-17 NOTE — PROGRESS NOTE ADULT - SUBJECTIVE AND OBJECTIVE BOX
CC: Abnormality of gait and mobility, Right MCA CVA    HPI: Patient with no new medical issues today, improved command following. More alert this AM.  Pain controlled.  Has been tolerating rehabilitation program.    acetaminophen     Tablet .. 650 milliGRAM(s) Oral every 6 hours PRN  aMIOdarone    Tablet 100 milliGRAM(s) Oral daily  apixaban 5 milliGRAM(s) Oral two times a day  aspirin  chewable 81 milliGRAM(s) Oral daily  melatonin 9 milliGRAM(s) Oral at bedtime  mirtazapine 7.5 milliGRAM(s) Oral at bedtime  modafinil 50 milliGRAM(s) Oral <User Schedule>  pantoprazole    Tablet 40 milliGRAM(s) Oral before breakfast  polyethylene glycol 3350 17 Gram(s) Oral at bedtime PRN  rosuvastatin 20 milliGRAM(s) Oral at bedtime  senna 2 Tablet(s) Oral at bedtime  tiZANidine 2 milliGRAM(s) Oral at bedtime     T(C): 36.8 (11-17-24 @ 07:30)  T(F): 98.2 (11-17-24 @ 07:30), Max: 98.2 (11-17-24 @ 07:30)  HR: 55 (11-17-24 @ 07:30) (55 - 62)  BP: 142/70 (11-17-24 @ 07:30) (136/71 - 144/74)  RR:  (15 - 16)  SpO2:  (98% - 98%)    In NAD  HEENT- EOMI  Heart- Well Perfused  Lungs- No resp distress, no use of accessory resp muscles  Abd- + BS, NT  Ext- No calf pain  Neuro- Exam unchanged, intermittent command following  Psych- Affect wnl        Assessment: Patient with diagnosis of Right MCA CVA admitted for comprehensive acute rehabilitation.    Plan:  - R MCA CVA, cardioembolic source vs hypercoagulability - s/p thrombectomy. CT 10/31 Stable, no hemorrhage. C/w Crestor, ASA, Eliquis, Continue therapies as tolerated  - A-fib- amiodarone metoprolol, Eliquis, cardio following  - Mood/sleep- Remeron, melatonin  - Wakefulness- modafinil   - Bowel- senna, Miralax   - Tone- tizanidine 2 mg qhs  - Meeting with nursing staff and hospitalist this morning to discuss medical updates.  - Continue current medications; patient medically stable.   - Patient is stable to continue current rehabilitation program.

## 2024-11-18 ENCOUNTER — TRANSCRIPTION ENCOUNTER (OUTPATIENT)
Age: 74
End: 2024-11-18

## 2024-11-18 PROBLEM — C34.92 ADENOCARCINOMA OF LEFT LUNG: Status: ACTIVE | Noted: 2024-11-18

## 2024-11-18 LAB
ALBUMIN SERPL ELPH-MCNC: 2.8 G/DL — LOW (ref 3.3–5)
ALP SERPL-CCNC: 78 U/L — SIGNIFICANT CHANGE UP (ref 40–120)
ALT FLD-CCNC: 75 U/L — HIGH (ref 10–45)
ANION GAP SERPL CALC-SCNC: 3 MMOL/L — LOW (ref 5–17)
AST SERPL-CCNC: 54 U/L — HIGH (ref 10–40)
BASOPHILS # BLD AUTO: 0.04 K/UL — SIGNIFICANT CHANGE UP (ref 0–0.2)
BASOPHILS NFR BLD AUTO: 0.9 % — SIGNIFICANT CHANGE UP (ref 0–2)
BILIRUB SERPL-MCNC: 0.5 MG/DL — SIGNIFICANT CHANGE UP (ref 0.2–1.2)
BUN SERPL-MCNC: 29 MG/DL — HIGH (ref 7–23)
CALCIUM SERPL-MCNC: 9.4 MG/DL — SIGNIFICANT CHANGE UP (ref 8.4–10.5)
CHLORIDE SERPL-SCNC: 108 MMOL/L — SIGNIFICANT CHANGE UP (ref 96–108)
CO2 SERPL-SCNC: 33 MMOL/L — HIGH (ref 22–31)
CREAT SERPL-MCNC: 1.05 MG/DL — SIGNIFICANT CHANGE UP (ref 0.5–1.3)
EGFR: 75 ML/MIN/1.73M2 — SIGNIFICANT CHANGE UP
EOSINOPHIL # BLD AUTO: 0.19 K/UL — SIGNIFICANT CHANGE UP (ref 0–0.5)
EOSINOPHIL NFR BLD AUTO: 4.5 % — SIGNIFICANT CHANGE UP (ref 0–6)
GLUCOSE SERPL-MCNC: 103 MG/DL — HIGH (ref 70–99)
HCT VFR BLD CALC: 36 % — LOW (ref 39–50)
HGB BLD-MCNC: 12.3 G/DL — LOW (ref 13–17)
IMM GRANULOCYTES NFR BLD AUTO: 0.2 % — SIGNIFICANT CHANGE UP (ref 0–0.9)
LYMPHOCYTES # BLD AUTO: 1.09 K/UL — SIGNIFICANT CHANGE UP (ref 1–3.3)
LYMPHOCYTES # BLD AUTO: 25.8 % — SIGNIFICANT CHANGE UP (ref 13–44)
MCHC RBC-ENTMCNC: 30.5 PG — SIGNIFICANT CHANGE UP (ref 27–34)
MCHC RBC-ENTMCNC: 34.2 G/DL — SIGNIFICANT CHANGE UP (ref 32–36)
MCV RBC AUTO: 89.3 FL — SIGNIFICANT CHANGE UP (ref 80–100)
MONOCYTES # BLD AUTO: 0.75 K/UL — SIGNIFICANT CHANGE UP (ref 0–0.9)
MONOCYTES NFR BLD AUTO: 17.8 % — HIGH (ref 2–14)
NEUTROPHILS # BLD AUTO: 2.14 K/UL — SIGNIFICANT CHANGE UP (ref 1.8–7.4)
NEUTROPHILS NFR BLD AUTO: 50.8 % — SIGNIFICANT CHANGE UP (ref 43–77)
NRBC # BLD: 0 /100 WBCS — SIGNIFICANT CHANGE UP (ref 0–0)
PLATELET # BLD AUTO: 117 K/UL — LOW (ref 150–400)
POTASSIUM SERPL-MCNC: 3.7 MMOL/L — SIGNIFICANT CHANGE UP (ref 3.5–5.3)
POTASSIUM SERPL-SCNC: 3.7 MMOL/L — SIGNIFICANT CHANGE UP (ref 3.5–5.3)
PROT SERPL-MCNC: 6.1 G/DL — SIGNIFICANT CHANGE UP (ref 6–8.3)
RBC # BLD: 4.03 M/UL — LOW (ref 4.2–5.8)
RBC # FLD: 13.2 % — SIGNIFICANT CHANGE UP (ref 10.3–14.5)
SODIUM SERPL-SCNC: 144 MMOL/L — SIGNIFICANT CHANGE UP (ref 135–145)
WBC # BLD: 4.22 K/UL — SIGNIFICANT CHANGE UP (ref 3.8–10.5)
WBC # FLD AUTO: 4.22 K/UL — SIGNIFICANT CHANGE UP (ref 3.8–10.5)

## 2024-11-18 PROCEDURE — 99232 SBSQ HOSP IP/OBS MODERATE 35: CPT

## 2024-11-18 PROCEDURE — 93010 ELECTROCARDIOGRAM REPORT: CPT

## 2024-11-18 RX ORDER — ROSUVASTATIN CALCIUM 5 MG/1
1 TABLET, FILM COATED ORAL
Qty: 30 | Refills: 0
Start: 2024-11-18 | End: 2024-12-17

## 2024-11-18 RX ORDER — MODAFINIL 200 MG/1
0.5 TABLET ORAL
Qty: 30 | Refills: 0
Start: 2024-11-18 | End: 2024-12-17

## 2024-11-18 RX ORDER — TIZANIDINE 4 MG/1
1 TABLET ORAL
Qty: 30 | Refills: 0
Start: 2024-11-18 | End: 2024-12-17

## 2024-11-18 RX ORDER — PANTOPRAZOLE SODIUM 40 MG/1
1 TABLET, DELAYED RELEASE ORAL
Qty: 30 | Refills: 0
Start: 2024-11-18 | End: 2024-12-17

## 2024-11-18 RX ORDER — ACETAMINOPHEN, DIPHENHYDRAMINE HCL, PHENYLEPHRINE HCL 325; 25; 5 MG/1; MG/1; MG/1
3 TABLET ORAL
Qty: 90 | Refills: 0
Start: 2024-11-18 | End: 2024-12-17

## 2024-11-18 RX ORDER — MIRTAZAPINE 15 MG/1
1 TABLET, FILM COATED ORAL
Qty: 30 | Refills: 0
Start: 2024-11-18 | End: 2024-12-17

## 2024-11-18 RX ORDER — SENNOSIDES 8.6 MG
2 TABLET ORAL
Qty: 0 | Refills: 0 | DISCHARGE
Start: 2024-11-18

## 2024-11-18 RX ORDER — MODAFINIL 200 MG/1
50 TABLET ORAL
Refills: 0 | Status: DISCONTINUED | OUTPATIENT
Start: 2024-11-18 | End: 2024-11-19

## 2024-11-18 RX ORDER — APIXABAN 2.5 MG/1
1 TABLET, FILM COATED ORAL
Qty: 60 | Refills: 0
Start: 2024-11-18 | End: 2024-12-17

## 2024-11-18 RX ORDER — POLYETHYLENE GLYCOL 3350 17 G/17G
17 POWDER, FOR SOLUTION ORAL
Qty: 0 | Refills: 0 | DISCHARGE
Start: 2024-11-18

## 2024-11-18 RX ORDER — ACETAMINOPHEN 500MG 500 MG/1
3 TABLET, COATED ORAL
Qty: 0 | Refills: 0 | DISCHARGE
Start: 2024-11-18

## 2024-11-18 RX ORDER — AMIODARONE HYDROCHLORIDE 200 MG/1
0.5 TABLET ORAL
Qty: 15 | Refills: 0
Start: 2024-11-18 | End: 2024-12-17

## 2024-11-18 RX ADMIN — Medication 2 TABLET(S): at 20:32

## 2024-11-18 RX ADMIN — APIXABAN 5 MILLIGRAM(S): 2.5 TABLET, FILM COATED ORAL at 06:12

## 2024-11-18 RX ADMIN — MODAFINIL 50 MILLIGRAM(S): 200 TABLET ORAL at 06:11

## 2024-11-18 RX ADMIN — ACETAMINOPHEN, DIPHENHYDRAMINE HCL, PHENYLEPHRINE HCL 9 MILLIGRAM(S): 325; 25; 5 TABLET ORAL at 20:33

## 2024-11-18 RX ADMIN — AMIODARONE HYDROCHLORIDE 100 MILLIGRAM(S): 200 TABLET ORAL at 06:12

## 2024-11-18 RX ADMIN — APIXABAN 5 MILLIGRAM(S): 2.5 TABLET, FILM COATED ORAL at 17:02

## 2024-11-18 RX ADMIN — Medication 81 MILLIGRAM(S): at 12:00

## 2024-11-18 RX ADMIN — ROSUVASTATIN CALCIUM 20 MILLIGRAM(S): 5 TABLET, FILM COATED ORAL at 20:34

## 2024-11-18 RX ADMIN — PANTOPRAZOLE SODIUM 40 MILLIGRAM(S): 40 TABLET, DELAYED RELEASE ORAL at 06:12

## 2024-11-18 RX ADMIN — MODAFINIL 50 MILLIGRAM(S): 200 TABLET ORAL at 12:11

## 2024-11-18 RX ADMIN — Medication 1 ENEMA: at 12:00

## 2024-11-18 RX ADMIN — TIZANIDINE 2 MILLIGRAM(S): 4 TABLET ORAL at 20:32

## 2024-11-18 RX ADMIN — MIRTAZAPINE 7.5 MILLIGRAM(S): 15 TABLET, FILM COATED ORAL at 20:31

## 2024-11-18 NOTE — PROGRESS NOTE ADULT - SUBJECTIVE AND OBJECTIVE BOX
SUBJECTIVE/ROS: Patient seen in the room. His tone on the left arm is improving. He otherwise denies chest pain, nausea, vomiting, abdominal pain, headache, or BLE pain.      HPI:  73 year old right handed [place]-speaking man with HTN, atrial fibrillation on Eliquis and Amiodarone, on Brilinta, former smoker x 34 years (quit 22months ago) discharged from  [facility] on 10/16 for left pulmonary nodule s/p Left lung lobectomy s/p chest tube presented to [facility] ED (10/17/24) with left sided weakness and left gaze preference. Daughter states patient last known normal at 11 am the last time she saw him well before she left to go to the store. She returned home at 1 pm and noted patient with left sided weakness and called EMS. Daughter confirmed she gave patient his Eliquis this morning. At baseline patient walks without assistive devices. On initial stroke evaluation, NIHSS-18 for patient awake with minimal verbal output right gaze preference not able to cross midline, left facial droop and LUE 0/5. LLE 1/5. CT with no ICH but noted dense right MCA. CTA head/neck with right ICA occlusion after the bifurcation extending to the supraclinoid ICA and right MCA, noted E/ICAD including L-ICA cavernous/supraclinoid segment mutlifocal stenosis, left VA mild to moderate stenosis. Patient not a candidate for TNK as on Eliquis and compliant. Patient underwent thrombectomy TICI 3. Patient admitted to MICU for close observation, required nicardipene drip. Repeat CTH with evolving Right MCA stroke without hemorrhage. Started ASA. Patient downgraded to Stroke Unit (10/19). A 1 c 6.1 (pre-OM), LDL 43 (wnl), TG 69 (wnl). TTE with mildly enlarged left atrium, mild concentric LVH, LV hyperdynamic EF >70%. [facility] CT surgery RAGINI Chacko from Dr. Mark Velez office [phone] reported that patient was on Eliquis and Brilinta prior to admission, admitted to [facility] 10/8 for left lower wedge x2 / lobectomy which required chest tube placement and was removed 10/16 and was told to hold blood thinners 3-7 days prior to admission. On discharge from [facility], EP was consulted as patient was in afib and recommend Amio load and resume Eliquis/Brilinta 10/16. Pathology resulted with adenocarcinoma but pending LN results for staging. No other HemOnc testing has been done. MRI brain with R-MCA territory infarct. Etiology of stroke cardio embolic due to Afib off AC due to recent surgery (10/2-10/16) vs hypercoagulability due to malignancy.      Vital Signs Last 24 Hrs  T(C): 36.4 (18 Nov 2024 07:25), Max: 36.4 (17 Nov 2024 19:57)  T(F): 97.6 (18 Nov 2024 07:25), Max: 97.6 (18 Nov 2024 07:25)  HR: 60 (18 Nov 2024 07:25) (57 - 69)  BP: 119/63 (18 Nov 2024 07:25) (119/63 - 146/78)  BP(mean): --  RR: 15 (18 Nov 2024 07:25) (15 - 16)  SpO2: 97% (18 Nov 2024 07:25) (97% - 98%)    Parameters below as of 18 Nov 2024 07:25  Patient On (Oxygen Delivery Method): room air          PHYSICAL EXAM  Constitutional - NAD, Comfortable in bed,   HEENT - NCAT, EOMI  Neck - Supple, No limited ROM  Chest - Breathing comfortably in room air   Cardiovascular - RR - bradycardia improving   Extremities -  No calf tenderness   Neurologic Exam - patient alert, partially oriented to place (hospital), intermittently oriented to time. Left facial twitching. Language normal. Mild/moderate dysarthria. L facial droop. Moves all limbs against gravity. RUE 4/5, LUE 4/5 proximally and 2/5 distally, RLE 5/5, LLE 4/5. Left tactile inattention.         LABS:                          12.3   4.22  )-----------( 117      ( 18 Nov 2024 05:20 )             36.0     11-18    144  |  108  |  29[H]  ----------------------------<  103[H]  3.7   |  33[H]  |  1.05    Ca    9.4      18 Nov 2024 05:20    TPro  6.1  /  Alb  2.8[L]  /  TBili  0.5  /  DBili  x   /  AST  54[H]  /  ALT  75[H]  /  AlkPhos  78  11-18    LIVER FUNCTIONS - ( 18 Nov 2024 05:20 )  Alb: 2.8 g/dL / Pro: 6.1 g/dL / ALK PHOS: 78 U/L / ALT: 75 U/L / AST: 54 U/L / GGT: x                 MEDICATIONS  (STANDING):  aMIOdarone    Tablet 100 milliGRAM(s) Oral daily  apixaban 5 milliGRAM(s) Oral two times a day  aspirin  chewable 81 milliGRAM(s) Oral daily  melatonin 9 milliGRAM(s) Oral at bedtime  mirtazapine 7.5 milliGRAM(s) Oral at bedtime  modafinil 50 milliGRAM(s) Oral <User Schedule>  pantoprazole    Tablet 40 milliGRAM(s) Oral before breakfast  rosuvastatin 20 milliGRAM(s) Oral at bedtime  senna 2 Tablet(s) Oral at bedtime  tiZANidine 2 milliGRAM(s) Oral at bedtime    MEDICATIONS  (PRN):  acetaminophen     Tablet .. 975 milliGRAM(s) Oral every 8 hours PRN Temp greater or equal to 38C (100.4F), Mild Pain (1 - 3)  polyethylene glycol 3350 17 Gram(s) Oral at bedtime PRN Constipation

## 2024-11-18 NOTE — PROGRESS NOTE ADULT - ASSESSMENT
73 year old male with PMH of HTN, atrial fibrillation (on eliquis), former smoker x 34 years presented to [facility] on 10/17 with right ICA occlusion and right MCA infarct s/p thrombectomy TICI 3. Discharged from [facility] day before CVA for left pulmonary nodule s/p Left lung lobectomy s/p chest tube. Had been off blood thinners (10/2-10/16)  prior to surgery, then noted to by in AFIB on day of [facility] discharge so was loaded with amiodarone and restarted on Eliquis and Brilinta. Etiology of stroke cardio embolic due to Afib off AC due to recent surgery (10/2-10/16) vs hypercoagulability due to malignancy. Lung path showing adenocarcinoma, no hemo/onc workup started.  Admitted for multidisciplinary rehab program on 10/31.    #R MCA infarct s/p thrombectomy TICI 3  -Right ICA occlusion after the bifurcation extending to the supraclinoid ICA and right MCA, noted E/ICAD including L-ICA cavernous/supraclinoid segment mutlifocal stenosis, left VA mild to moderate stenosis.   -s/p thrombectomy 10/17  -Etiology of stroke cardio embolic due to Afib off AC due to recent surgery (10/2-10/16) vs hypercoagulability due to malignancy  -CTH 10/31 stable, no hemorrhage   -Continue Crestor   -Continue ASA 81 mg daily  -Continue Eliquis    #PAF  #Asymptomatic Sinus Bradycardia  -EKG 11/3 showed sinus bradycardia  - Metoprolol was initially discontinued for significant bradycardia, patient had episode of tachycardia during PT and was resumed by primary team during the week.   -  pt metoprolol currently on hold per main team   - Amiodarone was reduced from 200 mg daily to 100 mg daily per cardio on 11/7 for bradycardia  - Continue Eliquis 5mg BID  - Patient will need yearly checks of LFTs, thyroid, EKG and lungs/PFTs for surveillance to assess for amiodarone toxicity. TSH now normal  - Keep K >4 and Mg>2  - cardio cx noted and appreciated     #HTN  - currently  Metoprolol Tart 12.5 mg BID is on hold   - DASh  - monitor VS including OH periodically    #HLD  -Continue Crestor     #Lung Adenocarcinoma s/p VATS lobectomy on 10/8  #Small loculated L pleural effusion post-op  -Follow up with [facility] heme/onc already established  -No treatment while in patient    #Pancreatic cyst, incidental finding on CT CAP  -Follow up with PCP for Pancreatic protocol imaging every 2 years for 10 years    #Transaminitis (Stable)  -Monitor CMP, c/w Crestor for now   #GI ppx - PPI    #DVT prophylaxis  -Eliquis 5mg BID

## 2024-11-18 NOTE — PROGRESS NOTE ADULT - ASSESSMENT
72 yo right handed [place]-speaking M with PMHX HTN, atrial fibrillation, former smoker x 34 years presented to [facility] on 10/17 with right ICA occlusion and right MCA infarct s/p thrombectomy TICI 3. Discharged from [facility] day before CVA for left pulmonary nodule s/p Left lung lobectomy s/p chest tube. Had been off blood thinners (10/2-10/16)  prior to surgery, then noted to by in AFIB on day of [facility] discharge so was loaded with amiodarone and restarted on Eliquis and Brilinta. Etiology of stroke cardio embolic due to Afib off AC due to recent surgery (10/2-10/16) vs hypercoagulability due to malignancy. Lung path showing adenocarcinoma, no hemo/onc workup started.  Admitted for multidisciplinary rehab program on 10/31.    #R MCA infarct s/p  thrombectomy TICI 3  - right ICA occlusion after the bifurcation extending to the supraclinoid ICA and right MCA, noted E/ICAD including L-ICA cavernous/supraclinoid segment multifocal stenosis, left VA mild to moderate stenosis. s/p thrombectomy 10/17  - Etiology of stroke cardio embolic due to Afib off AC due to recent surgery (10/2-10/16) vs hypercoagulability due to malignancy  - CTH 10/31 Stable, no hemorrhage  - Continue Crestor 20 mg nightly   - Continue ASA 81 mg daily  - Continue Eliquis 5mg BID  - Fall and aspiration precautions  - Comprehensive Multidisciplinary Rehab Program: Gait, ADL, Functional impairments PT/OT/ SLP 3 hours a day 5 days a week  -Continue tizanidine 2mg at bedtime for increased tone in left elbow 11/15 -improving    #Left facial twitching suspect for partial seizures 11/1  - Ativan 1 mg IV push x1   - Keppra 1 g IV load and continue 500 mg BID orally - reducing to 250 mg BID on 11/8 and dc'd on 11/14  - CT head urgent - stable   - EEG urgent -stable   - Prolactin, lactate -WNL   - Per family this started about 10 years prior after a [place] "[place]" [place], for which he did not follow up with neurology. 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 In consideration of the MRI picture (significant cortical involvement)-keppra discontinued     #PAF  #Noted bradycardia on 11/1 now improved   - Amiodarone 100mg daily as per cardiology -to continue to monitor HR   - Metoprolol 12.5 mg BID - 11/11 - discontinued -HR stable   - ELIQUIS 5mg BID  - EKG stable      #HTN/HLD  - home metoprolol 25 mg BID stopped due to bradycardia   - Continue Crestor 20mg HS  - Monitor BP     # Lung Adenocarcinoma s/p VATS lobectomy on 10/8  # Small loculated L pleural effusion post-op  - Follow up with [facility]/onc already established  - No treatment while in patient    #Pancreatic cyst, incidental finding on CT CAP  - Follow up with PCP for Pancreatic protocol imaging every 2 years for 10 years.     #Mood / Cognition:  - Neuropsych evaluation     #Sleep/mood  - Maintain quiet hours and low stim environment  - Melatonin 9mg at bedtime 11/8  -Continue Remeron 7.5mg daily 11/8 -to assist with mood/sleep/appetite   -Continue modafinil 50mg at 6am and 1pm for wakefulness 11/13    #Pain:  - Tylenol PRN  - avoid sedating meds that may affect cognitive recovery    #GI/Bowel:  - Senna 2 tabs daily  - Miralax prn  - GI ppx: None    #/Bladder:  - Monitor PVR if no void in 8h; SC for >400 cc  - Toileting schedule q4h    #Diet / Dysphagia:    - Diet: Soft and bite sized with thins  -MBS on 11/14 - continue soft and bite sized  - ongoing SLP assessment  - Nutrition to follow    #Skin/ Pressure Injury Prevention:   Mid abd scar, L upper flank incision sites x 4 , dry skin generalized, R foot lateral callus   - Turn Q2hrs in bed while awake, OOB to Chair, PT/OT/SLP    #DVT prophylaxis:  -Eliquis 5mg BID  - TEDs    #Precautions/ Restrictions  - Falls,   - Lungs: Aspiration, Incentive Spirometer      Outpatient Follow up:    Vitaly Dias MD  Internal medicine  [address] [place] [address] [phone]    Keke Lofton MD  [facility]  [address] [place] [address] [phone]

## 2024-11-18 NOTE — PHARMACOTHERAPY INTERVENTION NOTE - COMMENTS
Modafinil 100 mg 30 tablets for 30 day supply    PA submitted to [employer] directly due to patient not being found connected to plan in CoverMyMeds.    Reference #: [id number]    Informed Debra SHULTZ.    [employer] [phone] for PA status update    [employer] INS  ·	BIN: [id number]  ·	PCN: [id number]  ·	GRP: [id number]  ·	ID: [id number]

## 2024-11-18 NOTE — PROGRESS NOTE ADULT - SUBJECTIVE AND OBJECTIVE BOX
PROGRESS NOTE:     Patient is a 73y old  Male who presents with a chief complaint of R-MCA CVA (17 Nov 2024 15:08)      SUBJECTIVE / OVERNIGHT EVENTS: pt was seen and evaluated today. no complains offered      ADDITIONAL REVIEW OF SYSTEMS: as per HPI    MEDICATIONS  (STANDING):  aMIOdarone    Tablet 100 milliGRAM(s) Oral daily  apixaban 5 milliGRAM(s) Oral two times a day  aspirin  chewable 81 milliGRAM(s) Oral daily  melatonin 9 milliGRAM(s) Oral at bedtime  mirtazapine 7.5 milliGRAM(s) Oral at bedtime  modafinil 50 milliGRAM(s) Oral <User Schedule>  pantoprazole    Tablet 40 milliGRAM(s) Oral before breakfast  rosuvastatin 20 milliGRAM(s) Oral at bedtime  senna 2 Tablet(s) Oral at bedtime  tiZANidine 2 milliGRAM(s) Oral at bedtime    MEDICATIONS  (PRN):  acetaminophen     Tablet .. 975 milliGRAM(s) Oral every 8 hours PRN Temp greater or equal to 38C (100.4F), Mild Pain (1 - 3)  polyethylene glycol 3350 17 Gram(s) Oral at bedtime PRN Constipation      CAPILLARY BLOOD GLUCOSE        I&O's Summary      PHYSICAL EXAM:  Vital Signs Last 24 Hrs  T(C): 36.4 (18 Nov 2024 07:25), Max: 36.4 (17 Nov 2024 19:57)  T(F): 97.6 (18 Nov 2024 07:25), Max: 97.6 (18 Nov 2024 07:25)  HR: 60 (18 Nov 2024 07:25) (57 - 69)  BP: 119/63 (18 Nov 2024 07:25) (119/63 - 146/78)  BP(mean): --  RR: 15 (18 Nov 2024 07:25) (15 - 16)  SpO2: 97% (18 Nov 2024 07:25) (97% - 98%)    Parameters below as of 18 Nov 2024 07:25  Patient On (Oxygen Delivery Method): room air          Gen: nad, resting in chair   Neuro: awake and alert  Heent: eomi b/l, no jvd, no oral exudates  Pulm: cta b/l, no w/r/r  CV: +s1s2, no m/r/g  Ab: soft, nt/nd, normoactive bs x 4  Extrem: no edema, pulses intact and equal  Skin: small area of ecchymosis on left hand  Psych: normal        LABS:                        12.3   4.22  )-----------( 117      ( 18 Nov 2024 05:20 )             36.0     11-18    144  |  108  |  29[H]  ----------------------------<  103[H]  3.7   |  33[H]  |  1.05    Ca    9.4      18 Nov 2024 05:20    TPro  6.1  /  Alb  2.8[L]  /  TBili  0.5  /  DBili  x   /  AST  54[H]  /  ALT  75[H]  /  AlkPhos  78  11-18          Urinalysis Basic - ( 18 Nov 2024 05:20 )    Color: x / Appearance: x / SG: x / pH: x  Gluc: 103 mg/dL / Ketone: x  / Bili: x / Urobili: x   Blood: x / Protein: x / Nitrite: x   Leuk Esterase: x / RBC: x / WBC x   Sq Epi: x / Non Sq Epi: x / Bacteria: x          discussed during IDR and with consultants

## 2024-11-18 NOTE — PHARMACOTHERAPY INTERVENTION NOTE - INTERVENTION TYPE MISC
Transition of Care Quality 226: Preventive Care And Screening: Tobacco Use: Screening And Cessation Intervention: Patient screened for tobacco and never smoked Detail Level: Detailed Quality 130: Documentation Of Current Medications In The Medical Record: Current Medications Documented Quality 431: Preventive Care And Screening: Unhealthy Alcohol Use - Screening: Unhealthy alcohol use screening not performed, reason not otherwise specified

## 2024-11-18 NOTE — DISCHARGE NOTE NURSING/CASE MANAGEMENT/SOCIAL WORK - PATIENT PORTAL LINK FT
You can access the FollowMyHealth Patient Portal offered by [facility] by registering at the following website: [email or web address]. By joining [employer]’s FollowMyHealth portal, you will also be able to view your health information using other applications (apps) compatible with our system.

## 2024-11-19 ENCOUNTER — NON-APPOINTMENT (OUTPATIENT)
Age: 74
End: 2024-11-19

## 2024-11-19 VITALS
DIASTOLIC BLOOD PRESSURE: 77 MMHG | HEART RATE: 56 BPM | TEMPERATURE: 98 F | RESPIRATION RATE: 16 BRPM | OXYGEN SATURATION: 99 % | SYSTOLIC BLOOD PRESSURE: 148 MMHG

## 2024-11-19 PROCEDURE — 92610 EVALUATE SWALLOWING FUNCTION: CPT | Mod: GN

## 2024-11-19 PROCEDURE — 83605 ASSAY OF LACTIC ACID: CPT

## 2024-11-19 PROCEDURE — 85027 COMPLETE CBC AUTOMATED: CPT

## 2024-11-19 PROCEDURE — 97161 PT EVAL LOW COMPLEX 20 MIN: CPT | Mod: GP

## 2024-11-19 PROCEDURE — 92526 ORAL FUNCTION THERAPY: CPT | Mod: GN

## 2024-11-19 PROCEDURE — 87635 SARS-COV-2 COVID-19 AMP PRB: CPT

## 2024-11-19 PROCEDURE — 97110 THERAPEUTIC EXERCISES: CPT | Mod: GP

## 2024-11-19 PROCEDURE — 74230 X-RAY XM SWLNG FUNCJ C+: CPT

## 2024-11-19 PROCEDURE — 97116 GAIT TRAINING THERAPY: CPT | Mod: GP

## 2024-11-19 PROCEDURE — 84439 ASSAY OF FREE THYROXINE: CPT

## 2024-11-19 PROCEDURE — 36415 COLL VENOUS BLD VENIPUNCTURE: CPT

## 2024-11-19 PROCEDURE — 92523 SPEECH SOUND LANG COMPREHEN: CPT | Mod: GN

## 2024-11-19 PROCEDURE — 93005 ELECTROCARDIOGRAM TRACING: CPT

## 2024-11-19 PROCEDURE — 82962 GLUCOSE BLOOD TEST: CPT

## 2024-11-19 PROCEDURE — 95812 EEG 41-60 MINUTES: CPT

## 2024-11-19 PROCEDURE — 81001 URINALYSIS AUTO W/SCOPE: CPT

## 2024-11-19 PROCEDURE — 85025 COMPLETE CBC W/AUTO DIFF WBC: CPT

## 2024-11-19 PROCEDURE — 92611 MOTION FLUOROSCOPY/SWALLOW: CPT | Mod: GN

## 2024-11-19 PROCEDURE — 97530 THERAPEUTIC ACTIVITIES: CPT | Mod: GO

## 2024-11-19 PROCEDURE — 92507 TX SP LANG VOICE COMM INDIV: CPT | Mod: GN

## 2024-11-19 PROCEDURE — 97542 WHEELCHAIR MNGMENT TRAINING: CPT | Mod: GO

## 2024-11-19 PROCEDURE — 70450 CT HEAD/BRAIN W/O DYE: CPT | Mod: MC

## 2024-11-19 PROCEDURE — 99232 SBSQ HOSP IP/OBS MODERATE 35: CPT

## 2024-11-19 PROCEDURE — 83735 ASSAY OF MAGNESIUM: CPT

## 2024-11-19 PROCEDURE — 97165 OT EVAL LOW COMPLEX 30 MIN: CPT | Mod: GO

## 2024-11-19 PROCEDURE — 97535 SELF CARE MNGMENT TRAINING: CPT | Mod: GO

## 2024-11-19 PROCEDURE — 99239 HOSP IP/OBS DSCHRG MGMT >30: CPT

## 2024-11-19 PROCEDURE — 97112 NEUROMUSCULAR REEDUCATION: CPT | Mod: GP

## 2024-11-19 PROCEDURE — 84443 ASSAY THYROID STIM HORMONE: CPT

## 2024-11-19 PROCEDURE — 84146 ASSAY OF PROLACTIN: CPT

## 2024-11-19 PROCEDURE — 80053 COMPREHEN METABOLIC PANEL: CPT

## 2024-11-19 RX ADMIN — MODAFINIL 50 MILLIGRAM(S): 200 TABLET ORAL at 05:23

## 2024-11-19 RX ADMIN — PANTOPRAZOLE SODIUM 40 MILLIGRAM(S): 40 TABLET, DELAYED RELEASE ORAL at 05:22

## 2024-11-19 RX ADMIN — APIXABAN 5 MILLIGRAM(S): 2.5 TABLET, FILM COATED ORAL at 05:21

## 2024-11-19 RX ADMIN — AMIODARONE HYDROCHLORIDE 100 MILLIGRAM(S): 200 TABLET ORAL at 05:22

## 2024-11-19 RX ADMIN — Medication 81 MILLIGRAM(S): at 11:19

## 2024-11-19 NOTE — PROGRESS NOTE ADULT - SUBJECTIVE AND OBJECTIVE BOX
Patient calling back for an update on medication that was suppose to be sent over to his pharmacy after his visit today. Patient is very upset stating that he is (P. Off) due to no one calling him back today after being told a nurse would call him back today. Caller may be reached at [phone]. Message routed to team for assistance.    PROGRESS NOTE:     Patient is a 73y old  Male who presents with a chief complaint of R-MCA CVA (18 Nov 2024 12:11)      SUBJECTIVE / OVERNIGHT EVENTS: pt was seen and evaluated today  he was resting comfortably     ADDITIONAL REVIEW OF SYSTEMS: as per HPI    MEDICATIONS  (STANDING):  aMIOdarone    Tablet 100 milliGRAM(s) Oral daily  apixaban 5 milliGRAM(s) Oral two times a day  aspirin  chewable 81 milliGRAM(s) Oral daily  melatonin 9 milliGRAM(s) Oral at bedtime  mirtazapine 7.5 milliGRAM(s) Oral at bedtime  modafinil 50 milliGRAM(s) Oral <User Schedule>  pantoprazole    Tablet 40 milliGRAM(s) Oral before breakfast  rosuvastatin 20 milliGRAM(s) Oral at bedtime  senna 2 Tablet(s) Oral at bedtime  tiZANidine 2 milliGRAM(s) Oral at bedtime    MEDICATIONS  (PRN):  acetaminophen     Tablet .. 975 milliGRAM(s) Oral every 8 hours PRN Temp greater or equal to 38C (100.4F), Mild Pain (1 - 3)  polyethylene glycol 3350 17 Gram(s) Oral at bedtime PRN Constipation      CAPILLARY BLOOD GLUCOSE        I&O's Summary      PHYSICAL EXAM:  Vital Signs Last 24 Hrs  T(C): 36.9 (19 Nov 2024 07:16), Max: 36.9 (19 Nov 2024 07:16)  T(F): 98.5 (19 Nov 2024 07:16), Max: 98.5 (19 Nov 2024 07:16)  HR: 56 (19 Nov 2024 07:16) (56 - 63)  BP: 148/77 (19 Nov 2024 07:16) (121/66 - 148/77)  BP(mean): --  RR: 16 (19 Nov 2024 07:16) (16 - 16)  SpO2: 99% (19 Nov 2024 07:16) (95% - 99%)    Parameters below as of 19 Nov 2024 07:16  Patient On (Oxygen Delivery Method): room air      Gen: nad, resting in chair   Neuro: awake and alert  Heent: eomi b/l, no jvd, no oral exudates  Pulm: cta b/l, no w/r/r  CV: +s1s2, no m/r/g  Ab: soft, nt/nd, normoactive bs x 4  Extrem: no edema, pulses intact and equal  Skin: small area of ecchymosis on left hand  Psych: normal         LABS:                        12.3   4.22  )-----------( 117      ( 18 Nov 2024 05:20 )             36.0     11-18    144  |  108  |  29[H]  ----------------------------<  103[H]  3.7   |  33[H]  |  1.05    Ca    9.4      18 Nov 2024 05:20    TPro  6.1  /  Alb  2.8[L]  /  TBili  0.5  /  DBili  x   /  AST  54[H]  /  ALT  75[H]  /  AlkPhos  78  11-18          Urinalysis Basic - ( 18 Nov 2024 05:20 )    Color: x / Appearance: x / SG: x / pH: x  Gluc: 103 mg/dL / Ketone: x  / Bili: x / Urobili: x   Blood: x / Protein: x / Nitrite: x   Leuk Esterase: x / RBC: x / WBC x   Sq Epi: x / Non Sq Epi: x / Bacteria: x          case discussed during IDR

## 2024-11-19 NOTE — PROGRESS NOTE ADULT - NUTRITIONAL ASSESSMENT
This patient has been assessed with a concern for Malnutrition and has been determined to have a diagnosis/diagnoses of Moderate protein-calorie malnutrition.    This patient is being managed with:   Diet Soft and Bite Sized-  Supplement Feeding Modality:  Oral  Ensure Plus High Protein Cans or Servings Per Day:  1       Frequency:  Three Times a day  Entered: Nov 14 2024 12:53PM  
This patient has been assessed with a concern for Malnutrition and has been determined to have a diagnosis/diagnoses of Moderate protein-calorie malnutrition.    This patient is being managed with:   Diet Soft and Bite Sized-  Supplement Feeding Modality:  Oral  Ensure Plus High Protein Cans or Servings Per Day:  1       Frequency:  Three Times a day  Entered: Nov 8 2024  3:29PM  
This patient has been assessed with a concern for Malnutrition and has been determined to have a diagnosis/diagnoses of Moderate protein-calorie malnutrition.    This patient is being managed with:   Diet Minced and Moist-  Entered: Oct 31 2024  5:08PM  
This patient has been assessed with a concern for Malnutrition and has been determined to have a diagnosis/diagnoses of Moderate protein-calorie malnutrition.    This patient is being managed with:   Diet Minced and Moist-  Entered: Oct 31 2024  5:08PM  
This patient has been assessed with a concern for Malnutrition and has been determined to have a diagnosis/diagnoses of Moderate protein-calorie malnutrition.    This patient is being managed with:   Diet Soft and Bite Sized-  Supplement Feeding Modality:  Oral  Ensure Plus High Protein Cans or Servings Per Day:  1       Frequency:  Three Times a day  Entered: Nov 14 2024 12:53PM  
This patient has been assessed with a concern for Malnutrition and has been determined to have a diagnosis/diagnoses of Moderate protein-calorie malnutrition.    This patient is being managed with:   Diet Minced and Moist-  Entered: Oct 31 2024  5:08PM  
This patient has been assessed with a concern for Malnutrition and has been determined to have a diagnosis/diagnoses of Moderate protein-calorie malnutrition.    This patient is being managed with:   Diet Minced and Moist-  Entered: Oct 31 2024  5:08PM  
This patient has been assessed with a concern for Malnutrition and has been determined to have a diagnosis/diagnoses of Moderate protein-calorie malnutrition.    This patient is being managed with:   Diet Minced and Moist-  Supplement Feeding Modality:  Oral  Ensure Plus High Protein Cans or Servings Per Day:  1       Frequency:  Three Times a day  Entered: Nov 13 2024 10:02AM  
This patient has been assessed with a concern for Malnutrition and has been determined to have a diagnosis/diagnoses of Moderate protein-calorie malnutrition.    This patient is being managed with:   Diet Minced and Moist-  Supplement Feeding Modality:  Oral  Ensure Plus High Protein Cans or Servings Per Day:  1       Frequency:  Three Times a day  Entered: Nov 13 2024 10:02AM  
This patient has been assessed with a concern for Malnutrition and has been determined to have a diagnosis/diagnoses of Moderate protein-calorie malnutrition.    This patient is being managed with:   Diet Soft and Bite Sized-  Supplement Feeding Modality:  Oral  Ensure Plus High Protein Cans or Servings Per Day:  1       Frequency:  Three Times a day  Entered: Nov 14 2024 12:53PM  
This patient has been assessed with a concern for Malnutrition and has been determined to have a diagnosis/diagnoses of Moderate protein-calorie malnutrition.    This patient is being managed with:   Diet Soft and Bite Sized-  Supplement Feeding Modality:  Oral  Ensure Plus High Protein Cans or Servings Per Day:  1       Frequency:  Three Times a day  Entered: Nov 8 2024  3:29PM  
This patient has been assessed with a concern for Malnutrition and has been determined to have a diagnosis/diagnoses of Moderate protein-calorie malnutrition.    This patient is being managed with:   Diet Minced and Moist-  Entered: Oct 31 2024  5:08PM  
This patient has been assessed with a concern for Malnutrition and has been determined to have a diagnosis/diagnoses of Moderate protein-calorie malnutrition.    This patient is being managed with:   Diet Soft and Bite Sized-  Supplement Feeding Modality:  Oral  Ensure Plus High Protein Cans or Servings Per Day:  1       Frequency:  Three Times a day  Entered: Nov 14 2024 12:53PM  
This patient has been assessed with a concern for Malnutrition and has been determined to have a diagnosis/diagnoses of Moderate protein-calorie malnutrition.    This patient is being managed with:   Diet Minced and Moist-  Supplement Feeding Modality:  Oral  Ensure Plus High Protein Cans or Servings Per Day:  1       Frequency:  Three Times a day  Entered: Nov 13 2024 10:02AM

## 2024-11-19 NOTE — PROGRESS NOTE ADULT - NS ATTEND AMEND GEN_ALL_CORE FT
I have personally seen and examined the patient with the NP. Medical records reviewed. I have made amendments to the documentation where necessary and adjusted the history, physical examination, and plan as documented by the NP.
I have personally seen and examined the patient with the NP. Medical records reviewed. I have made amendments to the documentation where necessary and adjusted the history, physical examination, and plan as documented by the NP.    Patient is being discharged  to [facility] today.  Discharge instructions were discussed with patient and family. Patient and family were educated on importance of medication compliance,  continued  care with PCP and follow-up care with the specialists in the community. Safety and fall risk precautions  were discussed in detail, counseled on healthy life style modifications.  All questions were answered to their satisfaction.    47 min spent
I have personally seen and examined the patient with the NP. Medical records reviewed. I have made amendments to the documentation where necessary and adjusted the history, physical examination, and plan as documented by the NP.      Multidisciplinary team meeting today:  patient's functional goals and needs, functional and clinical  progress were discussed, barriers to discharge were identified. Anticipate discharge home with home care, 24/7 supervision for safety vs [facility] facility placement.     EDOD 11/20/24
I have personally seen and examined the patient with the NP. Medical records reviewed. I have made amendments to the documentation where necessary and adjusted the history, physical examination, and plan as documented by the NP.    Multidisciplinary team meeting today:  patient's functional goals and needs, functional and clinical  progress were discussed, barriers to discharge were identified. Anticipate [facility] facility placement.     EDOD   11/19/24
Seen and examined, note revised    Patient reports good night rest   No acute pain   Persisting somnolence and ebulia   Difficulty swallowing noted today    Engaging in therapy       Continue therapy  Commence modafinil 50mg at 6am and 1pm for wakefulness   Dysphagia---monitor, SLP swallow evaluation and consider MBS if needed

## 2024-11-19 NOTE — PROGRESS NOTE ADULT - PROVIDER SPECIALTY LIST ADULT
Hospitalist
Neuro Rehabilitation
Rehab Medicine
Hospitalist
Neuro Rehabilitation
Neuro Rehabilitation
Physiatry
Hospitalist
Physiatry
Hospitalist
Hospitalist
Neuro Rehabilitation
Rehab Medicine
Hospitalist
Neuro Rehabilitation
Hospitalist
Hospitalist

## 2024-11-19 NOTE — PROGRESS NOTE ADULT - NS ATTEND OPT1A GEN_ALL_CORE
History/Exam/Medical decision making
Medical decision making

## 2024-11-19 NOTE — PROGRESS NOTE ADULT - REASON FOR ADMISSION
R-MCA CVA

## 2024-11-19 NOTE — PROGRESS NOTE ADULT - ASSESSMENT
72 yo right handed [place]-speaking M with PMHX HTN, atrial fibrillation, former smoker x 34 years presented to [facility] on 10/17 with right ICA occlusion and right MCA infarct s/p thrombectomy TICI 3. Discharged from [facility] day before CVA for left pulmonary nodule s/p Left lung lobectomy s/p chest tube. Had been off blood thinners (10/2-10/16)  prior to surgery, then noted to by in AFIB on day of [facility] discharge so was loaded with amiodarone and restarted on Eliquis and Brilinta. Etiology of stroke cardio embolic due to Afib off AC due to recent surgery (10/2-10/16) vs hypercoagulability due to malignancy. Lung path showing adenocarcinoma, no hemo/onc workup started.  Admitted for multidisciplinary rehab program on 10/31.    #R MCA infarct s/p  thrombectomy TICI 3  - right ICA occlusion after the bifurcation extending to the supraclinoid ICA and right MCA, noted E/ICAD including L-ICA cavernous/supraclinoid segment multifocal stenosis, left VA mild to moderate stenosis. s/p thrombectomy 10/17  - Etiology of stroke cardio embolic due to Afib off AC due to recent surgery (10/2-10/16) vs hypercoagulability due to malignancy  - CTH 10/31 Stable, no hemorrhage  - Continue Crestor 20 mg nightly   - Continue ASA 81 mg daily  - Continue Eliquis 5mg BID  -Continue tizanidine 2mg at bedtime for increased tone in left elbow 11/15 -improving    #Left facial twitching suspect for partial seizures 11/1  - Ativan 1 mg IV push x1   - Keppra 1 g IV load and continue 500 mg BID orally - reducing to 250 mg BID on 11/8 and dc'd on 11/14  - CT head urgent - stable   - EEG urgent -stable   - Prolactin, lactate -WNL   - Per family this started about 10 years prior after a first "[place]" [place], for which he did not follow up with neurology. 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 In consideration of the MRI picture (significant cortical involvement)-keppra discontinued     #PAF  #Noted bradycardia on 11/1 now improved   - Amiodarone 100mg daily as per cardiology -to continue to monitor HR   - Metoprolol 12.5 mg BID - 11/11 - discontinued -HR stable   - ELIQUIS 5mg BID     #HTN/HLD  - home metoprolol 25 mg BID stopped due to bradycardia   - Continue Crestor 20mg HS    # Lung Adenocarcinoma s/p VATS lobectomy on 10/8  # Small loculated L pleural effusion post-op  - Follow up with [facility] heme/onc already established  - No treatment while in patient    #Pancreatic cyst, incidental finding on CT CAP  - Follow up with PCP for Pancreatic protocol imaging every 2 years for 10 years.     #Sleep/mood  - Melatonin 9mg at bedtime 11/8  -Continue Remeron 7.5mg daily 11/8 -to assist with mood/sleep/appetite   -Continue modafinil 50mg at 6am and 1pm for wakefulness 11/13    #Pain:  - Tylenol PRN    #GI/Bowel:  - Senna 2 tabs daily  - Miralax prn  - GI ppx: None      #Diet / Dysphagia:    - Diet: Soft and bite sized with thins    #Skin/ Pressure Injury Prevention:   Mid abd scar, L upper flank incision sites x 4 , dry skin generalized, R foot lateral callus       Outpatient Follow up:    Vitaly Dias MD  Internal medicine  [address] [place] [address] [phone]    Keke Lofton MD  [facility]  [id number] [place] [address] [phone]    STABLE AND READY FOR DISCHARGE HOME.

## 2024-11-19 NOTE — PROGRESS NOTE ADULT - SUBJECTIVE AND OBJECTIVE BOX
SUBJECTIVE/ROS: Patient seen in the room. He denies chest pain, nausea, vomiting, abdominal pain, headache, or BLE pain. He is stable and ready for discharge home.       HPI:  73 year old right handed [place]-speaking man with HTN, atrial fibrillation on Eliquis and Amiodarone, on Brilinta, former smoker x 34 years (quit 22months ago) discharged from  [facility] on 10/16 for left pulmonary nodule s/p Left lung lobectomy s/p chest tube presented to [facility] ED (10/17/24) with left sided weakness and left gaze preference. Daughter states patient last known normal at 11 am the last time she saw him well before she left to go to the store. She returned home at 1 pm and noted patient with left sided weakness and called EMS. Daughter confirmed she gave patient his Eliquis this morning. At baseline patient walks without assistive devices. On initial stroke evaluation, NIHSS-18 for patient awake with minimal verbal output right gaze preference not able to cross midline, left facial droop and LUE 0/5. LLE 1/5. CT with no ICH but noted dense right MCA. CTA head/neck with right ICA occlusion after the bifurcation extending to the supraclinoid ICA and right MCA, noted E/ICAD including L-ICA cavernous/supraclinoid segment mutlifocal stenosis, left VA mild to moderate stenosis. Patient not a candidate for TNK as on Eliquis and compliant. Patient underwent thrombectomy TICI 3. Patient admitted to MICU for close observation, required nicardipene drip. Repeat CTH with evolving Right MCA stroke without hemorrhage. Started ASA. Patient downgraded to Stroke Unit (10/19). A 1 c 6.1 (pre-OM), LDL 43 (wnl), TG 69 (wnl). TTE with mildly enlarged left atrium, mild concentric LVH, LV hyperdynamic EF >70%. [facility] CT surgery RAGINI Chacko from Dr. Mark Velez office [phone] reported that patient was on Eliquis and Brilinta prior to admission, admitted to [facility] 10/8 for left lower wedge x2 / lobectomy which required chest tube placement and was removed 10/16 and was told to hold blood thinners 3-7 days prior to admission. On discharge from [facility], EP was consulted as patient was in afib and recommend Amio load and resume Eliquis/Brilinta 10/16. Pathology resulted with adenocarcinoma but pending LN results for staging. No other HemOnc testing has been done. MRI brain with R-MCA territory infarct. Etiology of stroke cardio embolic due to Afib off AC due to recent surgery (10/2-10/16) vs hypercoagulability due to malignancy.      Vital Signs Last 24 Hrs  T(C): 36.9 (19 Nov 2024 07:16), Max: 36.9 (19 Nov 2024 07:16)  T(F): 98.5 (19 Nov 2024 07:16), Max: 98.5 (19 Nov 2024 07:16)  HR: 56 (19 Nov 2024 07:16) (56 - 63)  BP: 148/77 (19 Nov 2024 07:16) (121/66 - 148/77)  BP(mean): --  RR: 16 (19 Nov 2024 07:16) (16 - 16)  SpO2: 99% (19 Nov 2024 07:16) (95% - 99%)    Parameters below as of 19 Nov 2024 07:16  Patient On (Oxygen Delivery Method): room air          PHYSICAL EXAM  Constitutional - NAD, Comfortable in bed,   HEENT - NCAT, EOMI  Neck - Supple, No limited ROM  Chest - Breathing comfortably in room air   Cardiovascular - RR - bradycardia improving   Extremities -  No calf tenderness   Neurologic Exam - patient alert, partially oriented to place (hospital), intermittently oriented to time. Left facial twitching. Language normal. Mild/moderate dysarthria. L facial droop. Moves all limbs against gravity. RUE 4/5, LUE 4/5 proximally and 2/5 distally, RLE 5/5, LLE 4/5. Left tactile inattention.         LABS:                          12.3   4.22  )-----------( 117      ( 18 Nov 2024 05:20 )             36.0     11-18    144  |  108  |  29[H]  ----------------------------<  103[H]  3.7   |  33[H]  |  1.05    Ca    9.4      18 Nov 2024 05:20    TPro  6.1  /  Alb  2.8[L]  /  TBili  0.5  /  DBili  x   /  AST  54[H]  /  ALT  75[H]  /  AlkPhos  78  11-18    LIVER FUNCTIONS - ( 18 Nov 2024 05:20 )  Alb: 2.8 g/dL / Pro: 6.1 g/dL / ALK PHOS: 78 U/L / ALT: 75 U/L / AST: 54 U/L / GGT: x                 MEDICATIONS  (STANDING):  aMIOdarone    Tablet 100 milliGRAM(s) Oral daily  apixaban 5 milliGRAM(s) Oral two times a day  aspirin  chewable 81 milliGRAM(s) Oral daily  melatonin 9 milliGRAM(s) Oral at bedtime  mirtazapine 7.5 milliGRAM(s) Oral at bedtime  modafinil 50 milliGRAM(s) Oral <User Schedule>  pantoprazole    Tablet 40 milliGRAM(s) Oral before breakfast  rosuvastatin 20 milliGRAM(s) Oral at bedtime  senna 2 Tablet(s) Oral at bedtime  tiZANidine 2 milliGRAM(s) Oral at bedtime    MEDICATIONS  (PRN):  acetaminophen     Tablet .. 975 milliGRAM(s) Oral every 8 hours PRN Temp greater or equal to 38C (100.4F), Mild Pain (1 - 3)  polyethylene glycol 3350 17 Gram(s) Oral at bedtime PRN Constipation

## 2024-11-19 NOTE — PROGRESS NOTE ADULT - TIME BILLING
- Ordering, reviewing, and interpreting labs, testing, and imaging.  - Independently obtaining a review of systems and performing a physical exam  - Reviewing prior hospitalization and where necessary, outpatient records.  - Counselling and educating patient and family regarding interpretation of aforementioned items and plan of care.
This includes reviewing results/imaging and discussions with specialists, nursing, case management/social work. Further tests, medications, and procedures have been ordered as indicated. Results and the plan of care were communicated to the patient and/or their family member. Supporting documentation was completed and added to the patient's chart.
Time spent includes direct patient care  (interview and examination of patient), discussion with other providers, support staff and/or patient's family members, review of medical records, ordering diagnostic tests and analyzing results, and documentation.
- Ordering, reviewing, and interpreting labs, testing, and imaging.  - Independently obtaining a review of systems and performing a physical exam  - Reviewing prior hospitalization and where necessary, outpatient records.  - Counselling and educating patient and family regarding interpretation of aforementioned items and plan of care.
- Ordering, reviewing, and interpreting labs, testing, and imaging.  - Independently obtaining a review of systems and performing a physical exam  - Reviewing prior hospitalization and where necessary, outpatient records.  - Counselling and educating patient and family regarding interpretation of aforementioned items and plan of care.
This includes reviewing results/imaging and discussions with specialists, nursing, case management/social work. Further tests, medications, and procedures have been ordered as indicated. Results and the plan of care were communicated to the patient and/or their family member. Supporting documentation was completed and added to the patient's chart.
Time spent includes direct patient care  (interview and examination of patient), discussion with other providers, support staff and/or patient's family members, review of medical records, ordering diagnostic tests and analyzing results, and documentation.
This includes reviewing results/imaging and discussions with specialists, nursing, case management/social work. Further tests, medications, and procedures have been ordered as indicated. Results and the plan of care were communicated to the patient and/or their family member. Supporting documentation was completed and added to the patient's chart.
Time spent includes direct patient care (interview and examination of patient), discussion with other providers, support staff and/or patient's family members, review of medical records, ordering diagnostic tests and analyzing results, and documentation
- Ordering, reviewing, and interpreting labs, testing, and imaging.  - Independently obtaining a review of systems and performing a physical exam  - Reviewing prior hospitalization and where necessary, outpatient records.  - Counselling and educating patient and family regarding interpretation of aforementioned items and plan of care.
Time spent includes direct patient care (interview and examination of patient), discussion with other providers, support staff and/or patient's family members, review of medical records, ordering diagnostic tests and analyzing results, and documentation

## 2024-11-21 ENCOUNTER — APPOINTMENT (OUTPATIENT)
Dept: THORACIC SURGERY | Facility: CLINIC | Age: 74
End: 2024-11-21
Payer: MEDICARE

## 2024-11-21 VITALS
TEMPERATURE: 97.5 F | HEART RATE: 54 BPM | OXYGEN SATURATION: 97 % | SYSTOLIC BLOOD PRESSURE: 117 MMHG | WEIGHT: 120 LBS | DIASTOLIC BLOOD PRESSURE: 64 MMHG | BODY MASS INDEX: 19.97 KG/M2 | RESPIRATION RATE: 18 BRPM

## 2024-11-21 DIAGNOSIS — C34.92 MALIGNANT NEOPLASM OF UNSPECIFIED PART OF LEFT BRONCHUS OR LUNG: ICD-10-CM

## 2024-11-21 PROCEDURE — 99024 POSTOP FOLLOW-UP VISIT: CPT

## 2024-11-29 PROBLEM — C34.90 MALIGNANT NEOPLASM OF UNSPECIFIED PART OF UNSPECIFIED BRONCHUS OR LUNG: Chronic | Status: ACTIVE | Noted: 2024-10-31

## 2024-12-09 NOTE — DIETITIAN INITIAL EVALUATION ADULT - ETIOLOGY
CONSTITUTIONAL: NAD  SKIN: Warm dry  HEAD: NCAT  EYES: NL inspection  ENT: MMM  CARD: RRR  RESP: CTAB  ABD: Soft, non tender, no rebound or guarding, neg CVAT  EXT: no pedal edema  NEURO: Grossly unremarkable  PSYCH: Cooperative, appropriate. related to inadequate protein-energy intake s/p CVA, acute hospitalization, dislike of institutional foods

## 2025-01-08 ENCOUNTER — NON-APPOINTMENT (OUTPATIENT)
Age: 75
End: 2025-01-08

## 2025-01-08 ENCOUNTER — APPOINTMENT (OUTPATIENT)
Dept: ELECTROPHYSIOLOGY | Facility: CLINIC | Age: 75
End: 2025-01-08
Payer: COMMERCIAL

## 2025-01-08 VITALS
HEART RATE: 46 BPM | SYSTOLIC BLOOD PRESSURE: 117 MMHG | BODY MASS INDEX: 19.99 KG/M2 | HEIGHT: 65 IN | DIASTOLIC BLOOD PRESSURE: 63 MMHG | WEIGHT: 120 LBS | OXYGEN SATURATION: 99 %

## 2025-01-08 DIAGNOSIS — I48.92 UNSPECIFIED ATRIAL FLUTTER: ICD-10-CM

## 2025-01-08 DIAGNOSIS — I25.10 ATHEROSCLEROTIC HEART DISEASE OF NATIVE CORONARY ARTERY W/OUT ANGINA PECTORIS: ICD-10-CM

## 2025-01-08 PROCEDURE — 99214 OFFICE O/P EST MOD 30 MIN: CPT

## 2025-01-08 PROCEDURE — 99204 OFFICE O/P NEW MOD 45 MIN: CPT

## 2025-01-08 PROCEDURE — 93000 ELECTROCARDIOGRAM COMPLETE: CPT

## 2025-01-08 PROCEDURE — G2211 COMPLEX E/M VISIT ADD ON: CPT

## 2025-01-08 RX ORDER — APIXABAN 5 MG/1
5 TABLET, FILM COATED ORAL
Qty: 30 | Refills: 3 | Status: ACTIVE | COMMUNITY
Start: 2025-01-08

## 2025-01-08 RX ORDER — AMIODARONE HYDROCHLORIDE 100 MG/1
100 TABLET ORAL DAILY
Qty: 30 | Refills: 2 | Status: DISCONTINUED | COMMUNITY
Start: 2025-01-08 | End: 2025-01-08

## 2025-01-08 RX ORDER — METOPROLOL TARTRATE 25 MG/1
25 TABLET ORAL
Qty: 30 | Refills: 2 | Status: ACTIVE | COMMUNITY
Start: 2025-01-08

## 2025-01-09 ENCOUNTER — NON-APPOINTMENT (OUTPATIENT)
Age: 75
End: 2025-01-09

## 2025-01-28 ENCOUNTER — APPOINTMENT (OUTPATIENT)
Dept: UROLOGY | Facility: CLINIC | Age: 75
End: 2025-01-28
Payer: MEDICARE

## 2025-01-28 VITALS
OXYGEN SATURATION: 98 % | WEIGHT: 114 LBS | DIASTOLIC BLOOD PRESSURE: 76 MMHG | BODY MASS INDEX: 18.97 KG/M2 | HEART RATE: 51 BPM | RESPIRATION RATE: 18 BRPM | SYSTOLIC BLOOD PRESSURE: 145 MMHG | TEMPERATURE: 97 F

## 2025-01-28 DIAGNOSIS — C34.92 MALIGNANT NEOPLASM OF UNSPECIFIED PART OF LEFT BRONCHUS OR LUNG: ICD-10-CM

## 2025-01-28 PROCEDURE — G2211 COMPLEX E/M VISIT ADD ON: CPT

## 2025-01-28 PROCEDURE — 99214 OFFICE O/P EST MOD 30 MIN: CPT

## 2025-01-28 RX ORDER — TAMSULOSIN HYDROCHLORIDE 0.4 MG/1
0.4 CAPSULE ORAL
Qty: 30 | Refills: 3 | Status: ACTIVE | COMMUNITY
Start: 2025-01-28 | End: 1900-01-01

## 2025-01-30 LAB
ANION GAP SERPL CALC-SCNC: 13 MMOL/L
APPEARANCE: CLEAR
BACTERIA: NEGATIVE /HPF
BILIRUBIN URINE: NEGATIVE
BLOOD URINE: NEGATIVE
BUN SERPL-MCNC: 18 MG/DL
CALCIUM OXALATE CRYSTALS: PRESENT
CALCIUM SERPL-MCNC: 9.5 MG/DL
CAST: 0 /LPF
CHLORIDE SERPL-SCNC: 107 MMOL/L
CO2 SERPL-SCNC: 25 MMOL/L
COLOR: YELLOW
CREAT SERPL-MCNC: 0.97 MG/DL
EGFR: 82 ML/MIN/1.73M2
EPITHELIAL CELLS: 1 /HPF
GLUCOSE QUALITATIVE U: >=1000 MG/DL
GLUCOSE SERPL-MCNC: 139 MG/DL
KETONES URINE: NEGATIVE MG/DL
LEUKOCYTE ESTERASE URINE: NEGATIVE
MICROSCOPIC-UA: NORMAL
NITRITE URINE: NEGATIVE
PH URINE: 6
POTASSIUM SERPL-SCNC: 3.9 MMOL/L
PROTEIN URINE: NORMAL MG/DL
PSA FREE FLD-MCNC: 31 %
PSA FREE SERPL-MCNC: 1.27 NG/ML
PSA SERPL-MCNC: 4.05 NG/ML
RED BLOOD CELLS URINE: 2 /HPF
REVIEW: NORMAL
SODIUM SERPL-SCNC: 145 MMOL/L
SPECIFIC GRAVITY URINE: 1.03
UROBILINOGEN URINE: 1 MG/DL
WHITE BLOOD CELLS URINE: 1 /HPF

## 2025-03-11 ENCOUNTER — APPOINTMENT (OUTPATIENT)
Dept: UROLOGY | Facility: CLINIC | Age: 75
End: 2025-03-11

## 2025-04-09 ENCOUNTER — APPOINTMENT (OUTPATIENT)
Dept: ELECTROPHYSIOLOGY | Facility: CLINIC | Age: 75
End: 2025-04-09

## 2025-04-17 ENCOUNTER — APPOINTMENT (OUTPATIENT)
Dept: THORACIC SURGERY | Facility: CLINIC | Age: 75
End: 2025-04-17
Payer: MEDICARE

## 2025-04-17 VITALS
HEART RATE: 69 BPM | SYSTOLIC BLOOD PRESSURE: 127 MMHG | RESPIRATION RATE: 16 BRPM | OXYGEN SATURATION: 99 % | DIASTOLIC BLOOD PRESSURE: 70 MMHG | BODY MASS INDEX: 18.97 KG/M2 | WEIGHT: 114 LBS

## 2025-04-17 DIAGNOSIS — C34.92 MALIGNANT NEOPLASM OF UNSPECIFIED PART OF LEFT BRONCHUS OR LUNG: ICD-10-CM

## 2025-04-17 DIAGNOSIS — R05.9 COUGH, UNSPECIFIED: ICD-10-CM

## 2025-04-17 PROCEDURE — 99214 OFFICE O/P EST MOD 30 MIN: CPT

## 2025-04-17 RX ORDER — GUAIFENESIN AND DEXTROMETHORPHAN 20; 200 MG/10ML; MG/10ML
20-200 SYRUP ORAL
Qty: 1 | Refills: 0 | Status: ACTIVE | COMMUNITY
Start: 2025-04-17 | End: 1900-01-01

## 2025-04-30 ENCOUNTER — APPOINTMENT (OUTPATIENT)
Dept: CARDIOLOGY | Facility: CLINIC | Age: 75
End: 2025-04-30

## 2025-04-30 ENCOUNTER — NON-APPOINTMENT (OUTPATIENT)
Age: 75
End: 2025-04-30

## 2025-04-30 VITALS
BODY MASS INDEX: 20.05 KG/M2 | HEIGHT: 63.78 IN | WEIGHT: 116 LBS | HEART RATE: 62 BPM | SYSTOLIC BLOOD PRESSURE: 112 MMHG | RESPIRATION RATE: 16 BRPM | OXYGEN SATURATION: 96 % | DIASTOLIC BLOOD PRESSURE: 69 MMHG

## 2025-04-30 PROCEDURE — 99215 OFFICE O/P EST HI 40 MIN: CPT | Mod: 1L

## 2025-04-30 PROCEDURE — 93000 ELECTROCARDIOGRAM COMPLETE: CPT | Mod: 1L

## 2025-04-30 PROCEDURE — G2211 COMPLEX E/M VISIT ADD ON: CPT | Mod: 1L

## 2025-04-30 PROCEDURE — XXXXX: CPT | Mod: 1L

## 2025-05-27 ENCOUNTER — NON-APPOINTMENT (OUTPATIENT)
Age: 75
End: 2025-05-27

## 2025-05-27 ENCOUNTER — APPOINTMENT (OUTPATIENT)
Dept: CARDIOLOGY | Facility: CLINIC | Age: 75
End: 2025-05-27
Payer: MEDICARE

## 2025-05-27 VITALS
SYSTOLIC BLOOD PRESSURE: 103 MMHG | RESPIRATION RATE: 16 BRPM | HEART RATE: 58 BPM | WEIGHT: 116 LBS | OXYGEN SATURATION: 98 % | DIASTOLIC BLOOD PRESSURE: 68 MMHG | BODY MASS INDEX: 20.05 KG/M2

## 2025-05-27 PROCEDURE — 93000 ELECTROCARDIOGRAM COMPLETE: CPT

## 2025-05-27 PROCEDURE — 99215 OFFICE O/P EST HI 40 MIN: CPT

## 2025-05-27 PROCEDURE — G2211 COMPLEX E/M VISIT ADD ON: CPT

## 2025-09-16 ENCOUNTER — APPOINTMENT (OUTPATIENT)
Dept: CARDIOLOGY | Facility: CLINIC | Age: 75
End: 2025-09-16
Payer: MEDICARE

## 2025-09-16 VITALS
BODY MASS INDEX: 19.53 KG/M2 | DIASTOLIC BLOOD PRESSURE: 66 MMHG | WEIGHT: 113 LBS | SYSTOLIC BLOOD PRESSURE: 102 MMHG | OXYGEN SATURATION: 100 % | RESPIRATION RATE: 16 BRPM | HEART RATE: 60 BPM

## 2025-09-16 PROCEDURE — 93000 ELECTROCARDIOGRAM COMPLETE: CPT

## 2025-09-16 PROCEDURE — 99215 OFFICE O/P EST HI 40 MIN: CPT

## 2025-09-16 PROCEDURE — G2211 COMPLEX E/M VISIT ADD ON: CPT

## (undated) DEVICE — SUT PROLENE 0 30" CT-1

## (undated) DEVICE — TROCAR COVIDIEN VERSAONE BLADELESS FIXATION 12MM STANDARD

## (undated) DEVICE — GOWN XL

## (undated) DEVICE — DRSG CURITY GAUZE SPONGE 4 X 4" 12-PLY

## (undated) DEVICE — SUT VICRYL 0 27" UR-6

## (undated) DEVICE — ELCTR BOVIE TIP BLADE INSULATED 6.5" EDGE

## (undated) DEVICE — ENDOCATCH GENERAL 10MM (PURPLE)

## (undated) DEVICE — ELCTR BOVIE TIP BLADE INSULATED 2.75" EDGE

## (undated) DEVICE — XI ARM GRASPER TIP UP FENESTRATED

## (undated) DEVICE — XI DRAPE COLUMN

## (undated) DEVICE — XI CORD BIPOLAR CAUTERY (BLUE)

## (undated) DEVICE — TUBING STRYKEFLOW II SUCTION / IRRIGATOR

## (undated) DEVICE — SYR LUER LOK 20CC

## (undated) DEVICE — XI ARM GRASPER BIPOLAR LONG 8MM

## (undated) DEVICE — DRSG TEGADERM 4X4.75"

## (undated) DEVICE — XI OBTURATOR OPTICAL BLADELESS 8MM

## (undated) DEVICE — BLADE SURGICAL #10 STAINLESS

## (undated) DEVICE — TROCAR COVIDIEN VERSAPORT BLADELESS OPTICAL 15MM STANDARD

## (undated) DEVICE — ELCTR BOVIE PENCIL SMOKE EVACUATION

## (undated) DEVICE — XI ARM FORCEP FENESTRATED BIPOLAR 8MM

## (undated) DEVICE — DRSG MASTISOL

## (undated) DEVICE — SUT SILK 2-0 30" SH

## (undated) DEVICE — XI DRAPE ARM

## (undated) DEVICE — NDL HYPO SAFE 22G X 1.5" (BLACK)

## (undated) DEVICE — MARKING PEN W RULER

## (undated) DEVICE — VENODYNE/SCD SLEEVE CALF MEDIUM

## (undated) DEVICE — CHEST DRAIN OASIS DRY SUCTION WATER SEAL

## (undated) DEVICE — SOL IRR POUR NS 0.9% 500ML

## (undated) DEVICE — TROCAR COVIDIEN VERSASTEP PLUS 15MM STANDARD

## (undated) DEVICE — TUBING OLYMPUS INSUFFLATION

## (undated) DEVICE — XI SEAL UNIVERSIAL 5-12MM

## (undated) DEVICE — LUBRICANT INST ELECTROLUBE Z SOLUTION

## (undated) DEVICE — XI ARM CLIP APPLIER MEDIUM-LARGE

## (undated) DEVICE — PACK ROBOTIC LIJ

## (undated) DEVICE — GRASPER LAPA 5MMX35CM

## (undated) DEVICE — STAPLER COVIDIEN ENDO GIA STANDARD HANDLE

## (undated) DEVICE — DRSG GAUZE PACKTNER ROLL

## (undated) DEVICE — ENDOCATCH GENERAL 15MM (PURPLE)

## (undated) DEVICE — D HELP - CLEARVIEW CLEARIFY SYSTEM